# Patient Record
Sex: MALE | Race: WHITE | Employment: OTHER | URBAN - METROPOLITAN AREA
[De-identification: names, ages, dates, MRNs, and addresses within clinical notes are randomized per-mention and may not be internally consistent; named-entity substitution may affect disease eponyms.]

---

## 2017-02-02 RX ORDER — GABAPENTIN 600 MG/1
TABLET ORAL
Qty: 270 TAB | Refills: 3 | Status: SHIPPED | OUTPATIENT
Start: 2017-02-02 | End: 2018-03-12 | Stop reason: SDUPTHER

## 2017-02-02 NOTE — TELEPHONE ENCOUNTER
From: Ashli Pereira  To: Negrita Magallanes MD  Sent: 2/2/2017 7:41 AM EST  Subject: Medication Renewal Request    Original authorizing provider: MD Ashli Ba would like a refill of the following medications:  gabapentin (NEURONTIN) 600 mg tablet Negrita Magallanes MD]    Preferred pharmacy: [place] [id number], [address]:

## 2017-02-16 ENCOUNTER — LAB ONLY (OUTPATIENT)
Dept: INTERNAL MEDICINE CLINIC | Age: 67
End: 2017-02-16

## 2017-02-16 ENCOUNTER — HOSPITAL ENCOUNTER (OUTPATIENT)
Dept: LAB | Age: 67
Discharge: HOME | End: 2017-02-16
Payer: MEDICARE

## 2017-02-16 DIAGNOSIS — Z11.59 NEED FOR HEPATITIS C SCREENING TEST: Primary | ICD-10-CM

## 2017-02-16 DIAGNOSIS — Z11.59 NEED FOR HEPATITIS C SCREENING TEST: ICD-10-CM

## 2017-02-16 LAB
ALBUMIN SERPL BCP-MCNC: 4.6 G/DL (ref 3.4–5)
ALBUMIN/GLOB SERPL: 1.6 {RATIO} (ref 0.8–1.7)
ALP SERPL-CCNC: 57 U/L (ref 45–117)
ALT SERPL-CCNC: 32 U/L (ref 16–61)
ANION GAP BLD CALC-SCNC: 8 MMOL/L (ref 3–18)
AST SERPL W P-5'-P-CCNC: 28 U/L (ref 15–37)
BILIRUB SERPL-MCNC: 0.9 MG/DL (ref 0.2–1)
BUN SERPL-MCNC: 16 MG/DL (ref 7–18)
BUN/CREAT SERPL: 19 (ref 12–20)
CALCIUM SERPL-MCNC: 9.3 MG/DL (ref 8.5–10.1)
CHLORIDE SERPL-SCNC: 104 MMOL/L (ref 100–108)
CHOLEST SERPL-MCNC: 127 MG/DL
CO2 SERPL-SCNC: 30 MMOL/L (ref 21–32)
CREAT SERPL-MCNC: 0.85 MG/DL (ref 0.6–1.3)
ERYTHROCYTE [DISTWIDTH] IN BLOOD BY AUTOMATED COUNT: 13.3 % (ref 11.6–14.5)
GLOBULIN SER CALC-MCNC: 2.9 G/DL (ref 2–4)
GLUCOSE SERPL-MCNC: 85 MG/DL (ref 74–99)
HCT VFR BLD AUTO: 45.2 % (ref 36–48)
HDLC SERPL-MCNC: 54 MG/DL (ref 40–60)
HDLC SERPL: 2.4 {RATIO} (ref 0–5)
HGB BLD-MCNC: 14.9 G/DL (ref 13–16)
LDLC SERPL CALC-MCNC: 60.4 MG/DL (ref 0–100)
LIPID PROFILE,FLP: NORMAL
MCH RBC QN AUTO: 31.4 PG (ref 24–34)
MCHC RBC AUTO-ENTMCNC: 33 G/DL (ref 31–37)
MCV RBC AUTO: 95.4 FL (ref 74–97)
PLATELET # BLD AUTO: 247 K/UL (ref 135–420)
PMV BLD AUTO: 10.3 FL (ref 9.2–11.8)
POTASSIUM SERPL-SCNC: 4.2 MMOL/L (ref 3.5–5.5)
PROT SERPL-MCNC: 7.5 G/DL (ref 6.4–8.2)
RBC # BLD AUTO: 4.74 M/UL (ref 4.7–5.5)
SODIUM SERPL-SCNC: 142 MMOL/L (ref 136–145)
TRIGL SERPL-MCNC: 63 MG/DL (ref ?–150)
VLDLC SERPL CALC-MCNC: 12.6 MG/DL
WBC # BLD AUTO: 4.3 K/UL (ref 4.6–13.2)

## 2017-02-16 PROCEDURE — 80053 COMPREHEN METABOLIC PANEL: CPT | Performed by: INTERNAL MEDICINE

## 2017-02-16 PROCEDURE — 85027 COMPLETE CBC AUTOMATED: CPT | Performed by: INTERNAL MEDICINE

## 2017-02-16 PROCEDURE — 86803 HEPATITIS C AB TEST: CPT | Performed by: INTERNAL MEDICINE

## 2017-02-16 PROCEDURE — 36415 COLL VENOUS BLD VENIPUNCTURE: CPT | Performed by: INTERNAL MEDICINE

## 2017-02-16 PROCEDURE — 80061 LIPID PANEL: CPT | Performed by: INTERNAL MEDICINE

## 2017-02-17 LAB
HCV AB SER IA-ACNC: 0.6 INDEX
HCV AB SERPL QL IA: NEGATIVE
HCV COMMENT,HCGAC: NORMAL

## 2017-02-19 ENCOUNTER — APPOINTMENT (OUTPATIENT)
Dept: GENERAL RADIOLOGY | Age: 67
End: 2017-02-19
Attending: EMERGENCY MEDICINE
Payer: MEDICARE

## 2017-02-19 ENCOUNTER — HOSPITAL ENCOUNTER (EMERGENCY)
Age: 67
Discharge: HOME | End: 2017-02-19
Attending: EMERGENCY MEDICINE | Admitting: EMERGENCY MEDICINE
Payer: MEDICARE

## 2017-02-19 VITALS
HEIGHT: 68 IN | HEART RATE: 73 BPM | BODY MASS INDEX: 22.73 KG/M2 | WEIGHT: 150 LBS | DIASTOLIC BLOOD PRESSURE: 63 MMHG | SYSTOLIC BLOOD PRESSURE: 129 MMHG | OXYGEN SATURATION: 100 % | RESPIRATION RATE: 16 BRPM | TEMPERATURE: 98.4 F

## 2017-02-19 DIAGNOSIS — S40.012A CONTUSION OF LEFT SHOULDER, INITIAL ENCOUNTER: ICD-10-CM

## 2017-02-19 DIAGNOSIS — S20.212A CONTUSION OF LEFT FRONT WALL OF THORAX, INITIAL ENCOUNTER: Primary | ICD-10-CM

## 2017-02-19 DIAGNOSIS — S60.011A CONTUSION OF RIGHT THUMB WITHOUT DAMAGE TO NAIL, INITIAL ENCOUNTER: ICD-10-CM

## 2017-02-19 PROCEDURE — 74011250636 HC RX REV CODE- 250/636: Performed by: EMERGENCY MEDICINE

## 2017-02-19 PROCEDURE — 71101 X-RAY EXAM UNILAT RIBS/CHEST: CPT

## 2017-02-19 PROCEDURE — 77030008323 HC SPLNT FNGR GTR DJOR -A

## 2017-02-19 PROCEDURE — 73140 X-RAY EXAM OF FINGER(S): CPT

## 2017-02-19 PROCEDURE — 99282 EMERGENCY DEPT VISIT SF MDM: CPT

## 2017-02-19 PROCEDURE — 73030 X-RAY EXAM OF SHOULDER: CPT

## 2017-02-19 PROCEDURE — 90471 IMMUNIZATION ADMIN: CPT

## 2017-02-19 PROCEDURE — 74011250637 HC RX REV CODE- 250/637: Performed by: EMERGENCY MEDICINE

## 2017-02-19 PROCEDURE — 90715 TDAP VACCINE 7 YRS/> IM: CPT | Performed by: EMERGENCY MEDICINE

## 2017-02-19 RX ORDER — HYDROCODONE BITARTRATE AND ACETAMINOPHEN 5; 325 MG/1; MG/1
1 TABLET ORAL
Status: COMPLETED | OUTPATIENT
Start: 2017-02-19 | End: 2017-02-19

## 2017-02-19 RX ORDER — HYDROCODONE BITARTRATE AND ACETAMINOPHEN 5; 325 MG/1; MG/1
TABLET ORAL
Qty: 10 TAB | Refills: 0 | Status: SHIPPED | OUTPATIENT
Start: 2017-02-19 | End: 2017-02-23 | Stop reason: ALTCHOICE

## 2017-02-19 RX ADMIN — TETANUS TOXOID, REDUCED DIPHTHERIA TOXOID AND ACELLULAR PERTUSSIS VACCINE, ADSORBED 0.5 ML: 5; 2.5; 8; 8; 2.5 SUSPENSION INTRAMUSCULAR at 10:50

## 2017-02-19 RX ADMIN — HYDROCODONE BITARTRATE AND ACETAMINOPHEN 1 TABLET: 5; 325 TABLET ORAL at 10:27

## 2017-02-19 NOTE — ED TRIAGE NOTES
Pt presents to the ED with left shoulder pain and left arm pain, and left back pain onset yesterday approximately 1600 hrs. Pt reports pain radiating to the left sternum and left posterior shoulder. Pt reports extreme pain with ROM. Pt denies head injury or LOC.
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
## 2017-02-19 NOTE — ED NOTES
Pt states ready for discharge. Pt states he will follow up with PCP as instructed by provider. Pt appears in NOAD. I have reviewed discharge instructions with the patient. Prescriptions were reviewed with patient instructed not to drink alcohol, drive a car, or operate heavy machinery while taking this medicine. The patient verbalized understanding. Patient seen leaving ED ambulatory without difficulty or need for assistance, with S/O in no sign of distress. Patient armband removed and shredded    Current Discharge Medication List      START taking these medications    Details   HYDROcodone-acetaminophen (NORCO) 5-325 mg per tablet Take 1-2 tablets PO every 4-6 hours as needed for pain control. If over the counter ibuprofen or acetaminophen was suggested, then only take the vicodin for pain not well controlled with the over the counter medication. Qty: 10 Tab, Refills: 0         CONTINUE these medications which have NOT CHANGED    Details   gabapentin (NEURONTIN) 600 mg tablet take 1 tablet by mouth three times a day  Qty: 270 Tab, Refills: 3      atorvastatin (LIPITOR) 10 mg tablet Take 1 Tab by mouth daily. Qty: 90 Tab, Refills: 3    Associated Diagnoses: Dyslipidemia      zolpidem (AMBIEN) 10 mg tablet Take 1 tablet by mouth nightly as needed. Qty: 30 tablet, Refills: 3    Associated Diagnoses: Annual physical exam      traMADol (ULTRAM) 50 mg tablet Take 1 Tab by mouth every six (6) hours as needed for Pain.   Qty: 60 Tab, Refills: 1         STOP taking these medications       diph,Pertuss,Acell,,Tet Vac-PF (ADACEL) 2 Lf-(2.5-5-3-5 mcg)-5Lf/0.5 mL susp Comments:   Reason for Stopping:

## 2017-02-19 NOTE — PATIENT INSTRUCTIONS
Medicare Part B Preventive Services Limitations Recommendation Scheduled   Bone Mass Measurement  (age 72 & older, biennial) Requires diagnosis related to osteoporosis or estrogen deficiency. Biennial benefit unless patient has history of long-term glucocorticoid tx or baseline is needed because initial test was by other method     Cardiovascular Screening Blood Tests (every 5 years)  Total cholesterol, HDL, Triglycerides Order as a panel if possible     Colorectal Cancer Screening  -Fecal occult blood test (annual)  -Flexible sigmoidoscopy (5y)  -Screening colonoscopy (10y)  -Barium Enema      Counseling to Prevent Tobacco Use (up to 8 sessions per year)  - Counseling greater than 3 and up to 10 minutes  - Counseling greater than 10 minutes Patients must be asymptomatic of tobacco-related conditions to receive as preventive service     Diabetes Screening Tests (at least every 3 years, Medicare covers annually or at 6-month intervals for prediabetic patients)    Fasting blood sugar (FBS) or glucose tolerance test (GTT) Patient must be diagnosed with one of the following:  -Hypertension, Dyslipidemia, obesity, previous impaired FBS or GTT  Or any two of the following: overweight, FH of diabetes, age ? 72, history of gestational diabetes, birth of baby weighing more than 9 pounds     Diabetes Self-Management Training (DSMT) (no USPSTF recommendation) Requires referral by treating physician for patient with diabetes or renal disease. 10 hours of initial DSMT session of no less than 30 minutes each in a continuous 12-month period. 2 hours of follow-up DSMT in subsequent years.      Glaucoma Screening (no USPSTF recommendation) Diabetes mellitus, family history, , age 48 or over,  American, age 72 or over     Human Immunodeficiency Virus (HIV) Screening (annually for increased risk patients)  HIV-1 and HIV-2 by EIA, CATHY, rapid antibody test, or oral mucosa transudate Patient must be at increased risk for HIV infection per USPSTF guidelines or pregnant. Tests covered annually for patients at increased risk. Pregnant patients may receive up to 3 test during pregnancy. Medical Nutrition Therapy (MNT) (for diabetes or renal disease not recommended schedule) Requires referral by treating physician for patient with diabetes or renal disease. Can be provided in same year as diabetes self-management training (DSMT), and CMS recommends medical nutrition therapy take place after DSMT. Up to 3 hours for initial year and 2 hours in subsequent years. Prostate Cancer Screening (annually up to age 76)  - Digital rectal exam (ODALYS)  - Prostate specific antigen (PSA) Annually (age 48 or over), ODALYS not paid separately when covered E/M service is provided on same date     Seasonal Influenza Vaccination (annually)      Pneumococcal Vaccination (once after 72)      Hepatitis B Vaccinations (if medium/high risk) Medium/high risk factors:  End-stage renal disease,  Hemophiliacs who received Factor VIII or IX concentrates, Clients of institutions for the mentally retarded, Persons who live in the same house as a HepB virus carrier, Homosexual men, Illicit injectable drug abusers. Screening Mammography (biennial age 54-69)? Annually (age 36 or over)     Screening Pap Tests and Pelvic Examination (up to age 79 and after 79 if unknown history or abnormal study last 10 years) Every 25 months except high risk     Ultrasound Screening for Abdominal Aortic Aneurysm (AAA) (once) Patient must be referred through IPPE and not have had a screening for abdominal aortic aneurysm before under Medicare.   Limited to patients who meet one of the following criteria:  - Men who are 73-68 years old and have smoked more than 100 cigarettes in their lifetime.  -Anyone with a FH of AAA  -Anyone recommended for screening by USPSTF

## 2017-02-19 NOTE — ACP (ADVANCE CARE PLANNING)
Advance Care Planning    Advance Care Planning (ACP) Provider Note - Comprehensive     Date of ACP Conversation: 02/23/17  Persons included in Conversation:  patient  Length of ACP Conversation in minutes:  16 minutes    Authorized Decision Maker (if patient is incapable of making informed decisions): This person is: daughter  Healthcare Agent/Medical Power of  under Advance Directive          General ACP for ALL Patients with Decision Making Capacity:   Importance of advance care planning, including choosing a healthcare agent to communicate patient's healthcare decisions if patient lost the ability to make decisions, such as after a sudden illness or accident  Understanding of the healthcare agent role was assessed and information provided  Exploration of values, goals, and preferences if recovery is not expected, even with continued medical treatment in the event of: Imminent death  Severe, permanent brain injury    Review of Existing Advance Directive:  na    For Serious or Chronic Illness:  Understanding of medical condition    Understanding of CPR, goals and expected outcomes, benefits and burdens discussed.     Interventions Provided:  Recommended completion of Advance Directive form after review of ACP materials and conversation with prospective healthcare agent   Recommended communicating the plan and making copies for the healthcare agent, personal physician, and others as appropriate (e.g., health system)  Recommended review of completed ACP document annually or upon change in health status

## 2017-02-19 NOTE — DISCHARGE INSTRUCTIONS
Scrapes (Abrasions): Care Instructions  Your Care Instructions  Scrapes (abrasions) are wounds where your skin has been rubbed or torn off. Most scrapes do not go deep into the skin, but some may remove several layers of skin. Scrapes usually don't bleed much, but they may ooze pinkish fluid. Scrapes on the head or face may appear worse than they are. They may bleed a lot because of the good blood supply to this area. Most scrapes heal well and may not need a bandage. They usually heal within 3 to 7 days. A large, deep scrape may take 1 to 2 weeks or longer to heal. A scab may form on some scrapes. Follow-up care is a key part of your treatment and safety. Be sure to make and go to all appointments, and call your doctor if you are having problems. It's also a good idea to know your test results and keep a list of the medicines you take. How can you care for yourself at home? · If your doctor told you how to care for your wound, follow your doctor's instructions. If you did not get instructions, follow this general advice:  ¨ Wash the scrape with clean water 2 times a day. Don't use hydrogen peroxide or alcohol, which can slow healing. ¨ You may cover the scrape with a thin layer of petroleum jelly, such as Vaseline, and a nonstick bandage. ¨ Apply more petroleum jelly and replace the bandage as needed. · Prop up the injured area on a pillow anytime you sit or lie down during the next 3 days. Try to keep it above the level of your heart. This will help reduce swelling. · Be safe with medicines. Take pain medicines exactly as directed. ¨ If the doctor gave you a prescription medicine for pain, take it as prescribed. ¨ If you are not taking a prescription pain medicine, ask your doctor if you can take an over-the-counter medicine. When should you call for help?   Call your doctor now or seek immediate medical care if:  · You have signs of infection, such as:  ¨ Increased pain, swelling, warmth, or redness around the scrape. ¨ Red streaks leading from the scrape. ¨ Pus draining from the scrape. ¨ A fever. · The scrape starts to bleed, and blood soaks through the bandage. Oozing small amounts of blood is normal.  Watch closely for changes in your health, and be sure to contact your doctor if the scrape is not getting better each day. Where can you learn more? Go to http://cheyanne-ted.info/. Enter A374 in the search box to learn more about \"Scrapes (Abrasions): Care Instructions. \"  Current as of: May 27, 2016  Content Version: 11.1  © 0957-6251 [employer]. Care instructions adapted under license by [employer] (which disclaims liability or warranty for this information). If you have questions about a medical condition or this instruction, always ask your healthcare professional. [place] [address] any warranty or liability for your use of this information. Bruises: Care Instructions  Your Care Instructions    Bruises occur when small blood vessels under the skin tear or rupture, most often from a twist, bump, or fall. Blood leaks into tissues under the skin and causes a black-and-blue spot that often turns colors, including purplish black, reddish blue, or yellowish green, as the bruise heals. Bruises hurt, but most are not serious and will go away on their own within 2 to 4 weeks. Sometimes, gravity causes them to spread down the body. A leg bruise usually will take longer to heal than a bruise on the face or arms. Follow-up care is a key part of your treatment and safety. Be sure to make and go to all appointments, and call your doctor if you are having problems. Its also a good idea to know your test results and keep a list of the medicines you take. How can you care for yourself at home? · Take pain medicines exactly as directed. ¨ If the doctor gave you a prescription medicine for pain, take it as prescribed.   ¨ If you are not taking a prescription pain medicine, ask your doctor if you can take an over-the-counter medicine. · Put ice or a cold pack on the area for 10 to 20 minutes at a time. Put a thin cloth between the ice and your skin. · If you can, prop up the bruised area on pillows as much as possible for the next few days. Try to keep the bruise above the level of your heart. When should you call for help? Call your doctor now or seek immediate medical care if:  · You have signs of infection, such as:  ¨ Increased pain, swelling, warmth, or redness. ¨ Red streaks leading from the bruise. ¨ Pus draining from the bruise. ¨ A fever. · You have a bruise on your leg and signs of a blood clot, such as:  ¨ Increasing redness and swelling along with warmth, tenderness, and pain in the bruised area. ¨ Pain in your calf, back of the knee, thigh, or groin. ¨ Redness and swelling in your leg or groin. · Your pain gets worse. Watch closely for changes in your health, and be sure to contact your doctor if:  · You do not get better as expected. Where can you learn more? Go to http://cheyanne-ted.info/. Enter [phone] in the search box to learn more about \"Bruises: Care Instructions. \"  Current as of: May 27, 2016  Content Version: 11.1  © 7517-5636 [employer]. Care instructions adapted under license by [email or web address] (which disclaims liability or warranty for this information). If you have questions about a medical condition or this instruction, always ask your healthcare professional. [place] [address] any warranty or liability for your use of this information.
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
## 2017-02-20 ENCOUNTER — PATIENT OUTREACH (OUTPATIENT)
Dept: INTERNAL MEDICINE CLINIC | Age: 67
End: 2017-02-20
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
## 2017-02-22 ENCOUNTER — PATIENT OUTREACH (OUTPATIENT)
Dept: INTERNAL MEDICINE CLINIC | Age: 67
End: 2017-02-22

## 2017-02-22 ENCOUNTER — OFFICE VISIT (OUTPATIENT)
Dept: ORTHOPEDIC SURGERY | Facility: CLINIC | Age: 67
End: 2017-02-22

## 2017-02-22 VITALS
HEIGHT: 68 IN | HEART RATE: 71 BPM | SYSTOLIC BLOOD PRESSURE: 129 MMHG | BODY MASS INDEX: 23.64 KG/M2 | DIASTOLIC BLOOD PRESSURE: 66 MMHG | WEIGHT: 156 LBS | TEMPERATURE: 98 F

## 2017-02-22 DIAGNOSIS — M79.644 THUMB PAIN, RIGHT: Primary | ICD-10-CM
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
## 2017-02-22 NOTE — MR AVS SNAPSHOT
Visit Information Date & Time Provider Department Dept. Phone Encounter #  
 2/22/2017  8:00 AM Rigoberto Mason, 27 [facility] - [facility] [phone] [id number] Your Appointments 2/23/2017  8:30 AM  
PHYSICAL with Gopi Wallace MD  
Internist of [facility]) Appt Note: rpe rd; rpe rd  
 [address], [address] [address] [address]  
  
   
 [address], [address] Upcoming Health Maintenance Date Due  
 MEDICARE YEARLY EXAM 2/24/2018 COLONOSCOPY 8/29/2018 GLAUCOMA SCREENING Q2Y 2/18/2019 DTaP/Tdap/Td series (2 - Td) 2/19/2027 Allergies as of 2/22/2017  Review Complete On: 2/22/2017 By: Rigoberto Mason MD  
  
 Severity Noted Reaction Type Reactions Flomax [Tamsulosin]  07/25/2011    Vertigo Uroxatral [Alfuzosin]  02/29/2012    Vertigo Current Immunizations  Reviewed on 2/18/2017 Name Date Influenza High Dose Vaccine PF 11/14/2016, 10/15/2015 Influenza Vaccine 11/10/2014 Pneumococcal Conjugate (PCV-13) 2/11/2016 Pneumococcal Polysaccharide (PPSV-23) 2/5/2015 Td 1/1/2010 Tdap 2/19/2017 10:50 AM  
 Zoster Vaccine, Live 1/1/2010 Not reviewed this visit You Were Diagnosed With   
  
 Codes Comments Thumb pain, right    -  Primary ICD-10-CM: B88.545 ICD-9-CM: 729.5 Vitals BP  
  
  
  
  
  
 129/66 Vitals History BMI and BSA Data Body Mass Index Body Surface Area  
 23.72 kg/m 2 1.84 m 2 Preferred Pharmacy Pharmacy Name Phone [address], [address] [place] [address] [phone] Your Updated Medication List  
  
   
This list is accurate as of: 2/22/17  8:34 AM.  Always use your most recent med list.  
  
  
  
  
 atorvastatin 10 mg tablet Commonly known as:  LIPITOR Take 1 Tab by mouth daily. gabapentin 600 mg tablet Commonly known as:  NEURONTIN  
take 1 tablet by mouth three times a day HYDROcodone-acetaminophen 5-325 mg per tablet Commonly known as:  Margaret Linnette Take 1-2 tablets PO every 4-6 hours as needed for pain control. If over the counter ibuprofen or acetaminophen was suggested, then only take the vicodin for pain not well controlled with the over the counter medication. traMADol 50 mg tablet Commonly known as:  ULTRAM  
Take 1 Tab by mouth every six (6) hours as needed for Pain.  
  
 zolpidem 10 mg tablet Commonly known as:  AMBIEN Take 1 tablet by mouth nightly as needed. We Performed the Following AMB POC XRAY, FINGER(S), 2+ VIEWS [54367 CPT(R)] Patient Instructions You should follow up in 2 weeks. If your condition worsens, contact our office. Finger Fracture: Care Instructions Your Care Instructions Breaks in the bones of the finger usually heal well in about 3 to 4 weeks. The pain and swelling from a broken finger can last for weeks. But it should steadily improve, starting a few days after you break it. It is very important that you wear and take care of the cast or splint exactly as your doctor tells you to so that your finger heals properly and does not end up crooked. Wearing a splint may interfere with your normal activities. Ask for help with daily tasks if you need it. You heal best when you take good care of yourself. Eat a variety of healthy foods, and don't smoke. Follow-up care is a key part of your treatment and safety. Be sure to make and go to all appointments, and call your doctor if you are having problems. It's also a good idea to know your test results and keep a list of the medicines you take. How can you care for yourself at home? · If your doctor put a splint on your finger, wear the splint exactly as directed. Do not remove it until your doctor says that you can. · Keep your hand raised above the level of your heart as much as you can. This will help reduce swelling. · Put ice or a cold pack on your finger for 10 to 20 minutes at a time. Try to do this every 1 to 2 hours for the next 3 days (when you are awake) or until the swelling goes down. Put a thin cloth between the ice and your skin. Keep the splint dry. · Be safe with medicines. Take pain medicines exactly as directed. ¨ If the doctor gave you a prescription medicine for pain, take it as prescribed. ¨ If you are not taking a prescription pain medicine, ask your doctor if you can take an over-the-counter medicine. When should you call for help? Call 911 anytime you think you may need emergency care. For example, call if: 
· Your finger is cool or pale or changes color. Call your doctor now or seek immediate medical care if: 
· Your pain gets much worse. · You have tingling, weakness, or numbness in your finger. · You have signs of infection, such as: 
¨ Increased pain, swelling, warmth, or redness. ¨ Red streaks leading from the area. ¨ Pus draining from the area. ¨ Swollen lymph nodes in your neck, armpits, or groin. ¨ A fever. Watch closely for changes in your health, and be sure to contact your doctor if: 
· Your finger is not steadily improving. Where can you learn more? Go to http://cheyanne-ted.info/. Enter U931 in the search box to learn more about \"Finger Fracture: Care Instructions. \" Current as of: May 23, 2016 Content Version: 11.1 © 5430-3880 Healthwise, Incorporated. Care instructions adapted under license by [employer] (which disclaims liability or warranty for this information). If you have questions about a medical condition or this instruction, always ask your healthcare professional. [place] [address] any warranty or liability for your use of this information. Introducing [facility] [place] [facility]! Dear Ramakrishna Morales: Thank you for requesting a "Hey, Neighbor!" account. Our records indicate that you already have an active "Hey, Neighbor!" account. You can access your account anytime at [email or web address] Did you know that you can access your hospital and ER discharge instructions at any time in "Hey, Neighbor!"? You can also review all of your test results from your hospital stay or ER visit. Additional Information If you have questions, please visit the Frequently Asked Questions section of the "Hey, Neighbor!" website at [email or web address] Remember, "Hey, Neighbor!" is NOT to be used for urgent needs. For medical emergencies, dial 911. Now available from your iPhone and Android! Please provide this summary of care documentation to your next provider. Your primary care clinician is listed as Amy Quispe. If you have any questions after today's visit, please call [phone].

## 2017-02-22 NOTE — PATIENT INSTRUCTIONS
You should follow up in 2 weeks. If your condition worsens, contact our office. Finger Fracture: Care Instructions  Your Care Instructions    Breaks in the bones of the finger usually heal well in about 3 to 4 weeks. The pain and swelling from a broken finger can last for weeks. But it should steadily improve, starting a few days after you break it. It is very important that you wear and take care of the cast or splint exactly as your doctor tells you to so that your finger heals properly and does not end up crooked. Wearing a splint may interfere with your normal activities. Ask for help with daily tasks if you need it. You heal best when you take good care of yourself. Eat a variety of healthy foods, and don't smoke. Follow-up care is a key part of your treatment and safety. Be sure to make and go to all appointments, and call your doctor if you are having problems. It's also a good idea to know your test results and keep a list of the medicines you take. How can you care for yourself at home? · If your doctor put a splint on your finger, wear the splint exactly as directed. Do not remove it until your doctor says that you can. · Keep your hand raised above the level of your heart as much as you can. This will help reduce swelling. · Put ice or a cold pack on your finger for 10 to 20 minutes at a time. Try to do this every 1 to 2 hours for the next 3 days (when you are awake) or until the swelling goes down. Put a thin cloth between the ice and your skin. Keep the splint dry. · Be safe with medicines. Take pain medicines exactly as directed. ¨ If the doctor gave you a prescription medicine for pain, take it as prescribed. ¨ If you are not taking a prescription pain medicine, ask your doctor if you can take an over-the-counter medicine. When should you call for help? Call 911 anytime you think you may need emergency care. For example, call if:  · Your finger is cool or pale or changes color.   Call your doctor now or seek immediate medical care if:  · Your pain gets much worse. · You have tingling, weakness, or numbness in your finger. · You have signs of infection, such as:  ¨ Increased pain, swelling, warmth, or redness. ¨ Red streaks leading from the area. ¨ Pus draining from the area. ¨ Swollen lymph nodes in your neck, armpits, or groin. ¨ A fever. Watch closely for changes in your health, and be sure to contact your doctor if:  · Your finger is not steadily improving. Where can you learn more? Go to http://cheyanne-ted.info/. Enter Z656 in the search box to learn more about \"Finger Fracture: Care Instructions. \"  Current as of: May 23, 2016  Content Version: 11.1  © 3820-9282 [employer]. Care instructions adapted under license by [employer] (which disclaims liability or warranty for this information). If you have questions about a medical condition or this instruction, always ask your healthcare professional. [place] [address] any warranty or liability for your use of this information.

## 2017-02-23 ENCOUNTER — OFFICE VISIT (OUTPATIENT)
Dept: INTERNAL MEDICINE CLINIC | Age: 67
End: 2017-02-23

## 2017-02-23 VITALS
HEART RATE: 70 BPM | BODY MASS INDEX: 23.19 KG/M2 | HEIGHT: 68 IN | DIASTOLIC BLOOD PRESSURE: 60 MMHG | SYSTOLIC BLOOD PRESSURE: 116 MMHG | WEIGHT: 153 LBS | OXYGEN SATURATION: 97 % | TEMPERATURE: 98.1 F

## 2017-02-23 DIAGNOSIS — Z12.11 SCREEN FOR COLON CANCER: ICD-10-CM

## 2017-02-23 DIAGNOSIS — Z00.00 ROUTINE GENERAL MEDICAL EXAMINATION AT A HEALTH CARE FACILITY: Primary | ICD-10-CM

## 2017-02-23 DIAGNOSIS — Z13.1 SCREENING FOR DIABETES MELLITUS: ICD-10-CM

## 2017-02-23 DIAGNOSIS — Z13.6 SCREENING FOR ISCHEMIC HEART DISEASE: ICD-10-CM

## 2017-02-23 DIAGNOSIS — M25.512 ACUTE PAIN OF LEFT SHOULDER: ICD-10-CM

## 2017-02-23 DIAGNOSIS — Z12.5 SPECIAL SCREENING FOR MALIGNANT NEOPLASM OF PROSTATE: ICD-10-CM

## 2017-02-23 DIAGNOSIS — M15.9 PRIMARY OSTEOARTHRITIS INVOLVING MULTIPLE JOINTS: ICD-10-CM

## 2017-02-23 DIAGNOSIS — Z13.39 SCREENING FOR ALCOHOLISM: ICD-10-CM

## 2017-02-23 DIAGNOSIS — Z13.31 SCREENING FOR DEPRESSION: ICD-10-CM

## 2017-02-23 DIAGNOSIS — E78.5 DYSLIPIDEMIA: ICD-10-CM

## 2017-02-23 DIAGNOSIS — N13.8 BPH WITH OBSTRUCTION/LOWER URINARY TRACT SYMPTOMS: ICD-10-CM

## 2017-02-23 DIAGNOSIS — K63.5 COLON POLYP: ICD-10-CM

## 2017-02-23 DIAGNOSIS — S62.501D: ICD-10-CM

## 2017-02-23 DIAGNOSIS — Z71.89 ADVANCED DIRECTIVES, COUNSELING/DISCUSSION: ICD-10-CM

## 2017-02-23 DIAGNOSIS — N40.1 BPH WITH OBSTRUCTION/LOWER URINARY TRACT SYMPTOMS: ICD-10-CM

## 2017-02-23 RX ORDER — OXYCODONE AND ACETAMINOPHEN 7.5; 325 MG/1; MG/1
1 TABLET ORAL
Qty: 30 TAB | Refills: 0 | Status: SHIPPED | OUTPATIENT
Start: 2017-02-23 | End: 2017-03-08 | Stop reason: SDUPTHER

## 2017-02-23 NOTE — PROGRESS NOTES
1. Have you been to the ER, urgent care clinic or hospitalized since your last visit? NO.     2. Have you seen or consulted any other health care providers outside of the [address] [place] [address] since your last visit (Include any pap smears or colon screening)? NO      Do you have an Advanced Directive? NO    Would you like information on Advanced Directives?  YES
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
## 2017-03-08 ENCOUNTER — OFFICE VISIT (OUTPATIENT)
Dept: ORTHOPEDIC SURGERY | Facility: CLINIC | Age: 67
End: 2017-03-08

## 2017-03-08 VITALS
TEMPERATURE: 98.1 F | WEIGHT: 153 LBS | HEART RATE: 72 BPM | DIASTOLIC BLOOD PRESSURE: 66 MMHG | HEIGHT: 68 IN | SYSTOLIC BLOOD PRESSURE: 120 MMHG | BODY MASS INDEX: 23.19 KG/M2

## 2017-03-08 DIAGNOSIS — R07.89 ATYPICAL CHEST PAIN: ICD-10-CM

## 2017-03-08 DIAGNOSIS — S62.501D: ICD-10-CM

## 2017-03-08 DIAGNOSIS — G89.11 ACUTE SHOULDER PAIN DUE TO TRAUMA, LEFT: ICD-10-CM

## 2017-03-08 DIAGNOSIS — M25.512 ACUTE PAIN OF LEFT SHOULDER: ICD-10-CM

## 2017-03-08 DIAGNOSIS — M25.512 ACUTE SHOULDER PAIN DUE TO TRAUMA, LEFT: ICD-10-CM

## 2017-03-08 DIAGNOSIS — M79.644 THUMB PAIN, RIGHT: Primary | ICD-10-CM

## 2017-03-08 RX ORDER — OXYCODONE AND ACETAMINOPHEN 7.5; 325 MG/1; MG/1
1 TABLET ORAL
Qty: 30 TAB | Refills: 0 | Status: SHIPPED | OUTPATIENT
Start: 2017-03-08 | End: 2019-03-11

## 2017-03-08 NOTE — PATIENT INSTRUCTIONS
Shoulder Sprain: Care Instructions  Your Care Instructions    A shoulder sprain occurs when you stretch or tear a ligament in your shoulder. Ligaments are tough tissues that connect one bone to another. A sprain can happen during sports, a fall, or projects around the house. Shoulder sprains usually get better with treatment at home. Follow-up care is a key part of your treatment and safety. Be sure to make and go to all appointments, and call your doctor if you are having problems. It's also a good idea to know your test results and keep a list of the medicines you take. How can you care for yourself at home? · Rest and protect your shoulder. Try to stop or reduce any action that causes pain. · If your doctor gave you a sling or immobilizer, wear it as directed. A sling or immobilizer supports your shoulder and may make you more comfortable. · Put ice or a cold pack on your shoulder for 10 to 20 minutes at a time. Try to do this every 1 to 2 hours for the next 3 days (when you are awake) or until the swelling goes down. Put a thin cloth between the ice and your skin. Some doctors suggest alternating between hot and cold. · Be safe with medicines. Read and follow all instructions on the label. ¨ If the doctor gave you a prescription medicine for pain, take it as prescribed. ¨ If you are not taking a prescription pain medicine, ask your doctor if you can take an over-the-counter medicine. · For the first day or two after an injury, avoid things that might increase swelling, such as hot showers, hot tubs, or hot packs. · After 2 or 3 days, if your swelling is gone, apply a heating pad set on low or a warm cloth to your shoulder. This helps keep your shoulder flexible. Some doctors suggest that you go back and forth between hot and cold. Put a thin cloth between the heating pad and your skin. · Follow your doctor's or physical therapist's directions for exercises.   · Return to your usual level of activity slowly. When should you call for help? Call your doctor now or seek immediate medical care if:  · Your pain is worse. · You cannot move your shoulder. · Your arm is cool or pale or changes color below the shoulder. · You have tingling, weakness, or numbness in your arm. Watch closely for changes in your health, and be sure to contact your doctor if:  · You do not get better as expected. Where can you learn more? Go to http://cheyanne-ted.info/. Enter C696 in the search box to learn more about \"Shoulder Sprain: Care Instructions. \"  Current as of: May 23, 2016  Content Version: 11.1  © 3253-4714 [employer]. Care instructions adapted under license by [employer] (which disclaims liability or warranty for this information). If you have questions about a medical condition or this instruction, always ask your healthcare professional. [address] any warranty or liability for your use of this information.
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
## 2017-03-08 NOTE — MR AVS SNAPSHOT
Visit Information Date & Time Provider Department Dept. Phone Encounter #  
 3/8/2017  7:30 AM Deepthi Wylie MD [facility] - [place] [phone] Your Appointments 2/22/2018  7:45 AM  
LAB with [facility] NURSE VISIT [facility] ([facility]) Appt Note: labs 1yr rd  
 [address] [address], [address] [address]  
  
   
 [address], [address]  
  
    
 3/1/2018  8:30 AM  
PHYSICAL with Braeden Carrillo MD  
[facility]) Appt Note: [address]   
 [address], [address] [address]  
  
   
 [address], [address] Upcoming Health Maintenance Date Due  
 MEDICARE YEARLY EXAM 2/24/2018 COLONOSCOPY 8/29/2018 GLAUCOMA SCREENING Q2Y 2/18/2019 DTaP/Tdap/Td series (2 - Td) 2/19/2027 Allergies as of 3/8/2017  Review Complete On: 3/8/2017 By: Deepthi Wylie MD  
  
 Severity Noted Reaction Type Reactions Flomax [Tamsulosin]  07/25/2011    Vertigo Uroxatral [Alfuzosin]  02/29/2012    Vertigo Current Immunizations  Reviewed on 2/18/2017 Name Date Influenza High Dose Vaccine PF 11/14/2016, 10/15/2015 Influenza Vaccine 11/10/2014 Pneumococcal Conjugate (PCV-13) 2/11/2016 Pneumococcal Polysaccharide (PPSV-23) 2/5/2015 Td 1/1/2010 Tdap 2/19/2017 10:50 AM  
 Zoster Vaccine, Live 1/1/2010 Not reviewed this visit You Were Diagnosed With   
  
 Codes Comments Thumb pain, right    -  Primary ICD-10-CM: Z90.045 ICD-9-CM: 729.5 Acute shoulder pain due to trauma, left     ICD-10-CM: M25.512, G89.11 ICD-9-CM: 719.41, 338.11 Atypical chest pain     ICD-10-CM: R07.89 ICD-9-CM: 786.59 Vitals BP Pulse Temp Height(growth percentile) Weight(growth percentile) BMI 120/66 72 98.1 °F (36.7 °C) 5' 8\" (1.727 m) 153 lb (69.4 kg) 23.26 kg/m2 Smoking Status Never Smoker Vitals History BMI and BSA Data Body Mass Index Body Surface Area  
 23.26 kg/m 2 1.82 m 2 Preferred Pharmacy Pharmacy Name Phone [address], [address] [place] [address] [phone] Your Updated Medication List  
  
   
This list is accurate as of: 3/8/17  8:44 AM.  Always use your most recent med list.  
  
  
  
  
 atorvastatin 10 mg tablet Commonly known as:  LIPITOR Take 1 Tab by mouth daily. gabapentin 600 mg tablet Commonly known as:  NEURONTIN  
take 1 tablet by mouth three times a day  
  
 oxyCODONE-acetaminophen 7.5-325 mg per tablet Commonly known as:  PERCOCET 7.5 Take 1 Tab by mouth every six (6) hours as needed for Pain. Max Daily Amount: 4 Tabs. We Performed the Following AMB POC XRAY, FINGER(S), 2+ VIEWS [86133 CPT(R)] AMB POC XRAY, SHOULDER; COMPLETE, 2+ [08283 CPT(R)] Patient Instructions Shoulder Sprain: Care Instructions Your Care Instructions A shoulder sprain occurs when you stretch or tear a ligament in your shoulder. Ligaments are tough tissues that connect one bone to another. A sprain can happen during sports, a fall, or projects around the house. Shoulder sprains usually get better with treatment at home. Follow-up care is a key part of your treatment and safety. Be sure to make and go to all appointments, and call your doctor if you are having problems. It's also a good idea to know your test results and keep a list of the medicines you take. How can you care for yourself at home? · Rest and protect your shoulder. Try to stop or reduce any action that causes pain. · If your doctor gave you a sling or immobilizer, wear it as directed. A sling or immobilizer supports your shoulder and may make you more comfortable. · Put ice or a cold pack on your shoulder for 10 to 20 minutes at a time. Try to do this every 1 to 2 hours for the next 3 days (when you are awake) or until the swelling goes down. Put a thin cloth between the ice and your skin. Some doctors suggest alternating between hot and cold. · Be safe with medicines. Read and follow all instructions on the label. ¨ If the doctor gave you a prescription medicine for pain, take it as prescribed. ¨ If you are not taking a prescription pain medicine, ask your doctor if you can take an over-the-counter medicine. · For the first day or two after an injury, avoid things that might increase swelling, such as hot showers, hot tubs, or hot packs. · After 2 or 3 days, if your swelling is gone, apply a heating pad set on low or a warm cloth to your shoulder. This helps keep your shoulder flexible. Some doctors suggest that you go back and forth between hot and cold. Put a thin cloth between the heating pad and your skin. · Follow your doctor's or physical therapist's directions for exercises. · Return to your usual level of activity slowly. When should you call for help? Call your doctor now or seek immediate medical care if: 
· Your pain is worse. · You cannot move your shoulder. · Your arm is cool or pale or changes color below the shoulder. · You have tingling, weakness, or numbness in your arm. Watch closely for changes in your health, and be sure to contact your doctor if: 
· You do not get better as expected. Where can you learn more? Go to http://cheyanne-ted.info/. Enter E114 in the search box to learn more about \"Shoulder Sprain: Care Instructions. \" Current as of: May 23, 2016 Content Version: 11.1 © 0996-6040 [employer]. Care instructions adapted under license by [employer] (which disclaims liability or warranty for this information).  If you have questions about a medical condition or this instruction, always ask your healthcare professional. [address] any warranty or liability for your use of this information. Introducing [facility]! Dear Danelle Caceres: Thank you for requesting a [employer] account. Our records indicate that you already have an active [employer] account. You can access your account anytime at [email or web address] Did you know that you can access your hospital and ER discharge instructions at any time in [employer]? You can also review all of your test results from your hospital stay or ER visit. Additional Information If you have questions, please visit the Frequently Asked Questions section of the [employer] website at [email or web address] Remember, [employer] is NOT to be used for urgent needs. For medical emergencies, dial 911. Now available from your iPhone and Android! Please provide this summary of care documentation to your next provider. Your primary care clinician is listed as Phyllis Gerber. If you have any questions after today's visit, please call [phone].

## 2017-03-09 ENCOUNTER — HOSPITAL ENCOUNTER (OUTPATIENT)
Dept: CT IMAGING | Age: 67
Discharge: HOME | End: 2017-03-09
Attending: ORTHOPAEDIC SURGERY
Payer: MEDICARE

## 2017-03-09 DIAGNOSIS — R07.89 ATYPICAL CHEST PAIN: ICD-10-CM

## 2017-03-09 PROCEDURE — 71250 CT THORAX DX C-: CPT

## 2017-03-10 ENCOUNTER — OFFICE VISIT (OUTPATIENT)
Dept: ORTHOPEDIC SURGERY | Age: 67
End: 2017-03-10

## 2017-03-10 VITALS
DIASTOLIC BLOOD PRESSURE: 62 MMHG | SYSTOLIC BLOOD PRESSURE: 108 MMHG | HEART RATE: 67 BPM | BODY MASS INDEX: 23.19 KG/M2 | HEIGHT: 68 IN | WEIGHT: 153 LBS | TEMPERATURE: 97.3 F

## 2017-03-10 DIAGNOSIS — S22.42XA CLOSED FRACTURE OF MULTIPLE RIBS OF LEFT SIDE, INITIAL ENCOUNTER: Primary | ICD-10-CM
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
## 2017-03-22 ENCOUNTER — PATIENT OUTREACH (OUTPATIENT)
Dept: INTERNAL MEDICINE CLINIC | Age: 67
End: 2017-03-22

## 2017-03-22 NOTE — PROGRESS NOTES
Goals Addressed             Most Recent     COMPLETED: Establish PCP relationships and regularly scheduled appointments. On track (3/22/2017)             Patient attended LEMUS Ashdown appointment on 2/23/2017.  COMPLETED: Reduce ED Utilization   On track (3/22/2017)             Episode closed: no hospitalization or ED admission post 30 days from discharge of 2/19/2017.

## 2017-03-28 ENCOUNTER — HOSPITAL ENCOUNTER (OUTPATIENT)
Dept: PHYSICAL THERAPY | Age: 67
Discharge: HOME | End: 2017-03-28
Payer: MEDICARE

## 2017-03-28 PROCEDURE — 97110 THERAPEUTIC EXERCISES: CPT

## 2017-03-28 PROCEDURE — G8985 CARRY GOAL STATUS: HCPCS

## 2017-03-28 PROCEDURE — G8984 CARRY CURRENT STATUS: HCPCS

## 2017-03-28 PROCEDURE — 97162 PT EVAL MOD COMPLEX 30 MIN: CPT
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
## 2017-03-31 ENCOUNTER — HOSPITAL ENCOUNTER (OUTPATIENT)
Dept: PHYSICAL THERAPY | Age: 67
Discharge: HOME | End: 2017-03-31
Payer: MEDICARE

## 2017-03-31 PROCEDURE — 97110 THERAPEUTIC EXERCISES: CPT

## 2017-03-31 PROCEDURE — 97016 VASOPNEUMATIC DEVICE THERAPY: CPT

## 2017-03-31 NOTE — PROGRESS NOTES
PT DAILY TREATMENT NOTE - [facility]     Patient Name: Angel Wiseman  Date:3/31/2017  : 1950  [x]  Patient  Verified  [place]or: [place] MED[place]ARE / Plan: [place] MEDICARE PART A & B / Product Type: Medicare /    In time:8:32  Out time:9:38  Total Treatment Time (min): 64  Total Timed Codes (min):49  1:1 Treatment Time [facility] only):45  Visit #: 2 of 18    Treatment Area: Pain in left shoulder [M25.512]    SUBJECTIVE  Pain Level (0-10 scale): 1-2/10 shoulder  Any medication changes, allergies to medications, adverse drug reactions, diagnosis change, or new procedure performed?: [x] No    [] Yes (see summary sheet for update)  Subjective functional status/changes:   [] No changes reported  Pt reports that he has had chronic neck pain. He was taking neurotin and gabapentin and that masked it, but the neck pain has been worse since he fell. He has an eval scheduled for therapy next week for his neck since the new script is from a different MD (Dr. Matt Molian).       OBJECTIVE    Modality rationale: decrease inflammation and decrease pain to improve the patients ability to tolerate daily tasks    Min Type Additional Details    [] Estim:  []Unatt       []IFC  []Premod                        []Other:  []w/ice   []w/heat  Position:  Location:    [] Estim: []Att    []TENS instruct  []NMES                    []Other:  []w/US   []w/ice   []w/heat  Position:  Location:    []  Traction: [] Cervical       []Lumbar                       [] Prone          []Supine                       []Intermittent   []Continuous Lbs:  [] before manual  [] after manual    []  Ultrasound: []Continuous   [] Pulsed                           []1MHz   []3MHz W/cm2:  Location:    []  Iontophoresis with dexamethasone         Location: [] Take home patch   [] In clinic   During Game Ready []  Ice     [x]  heat  []  Ice massage  []  Laser   []  Anodyne Position: seated  Location: neck     []  Laser with stim  []  Other:  Position:  Location:   15 [x]  Vasopneumatic Device Pressure:       [x] lo [] med [] hi   Temperature: [x] lo [] med [] hi   [x] Skin assessment post-treatment:  [x]intact []redness- no adverse reaction    []redness - adverse reaction:     49 min Therapeutic Exercise:  [x] See flow sheet :   Rationale: increase ROM and increase strength to improve the patients ability to improve ease of household tasks             With   [x] TE   [] TA   [] neuro   [] other: Patient Education: [x] Review HEP    [] Progressed/Changed HEP based on:   [] positioning   [] body mechanics   [] transfers   [] heat/ice application    [x] other: educated patient regarding new therex technique and purpose,  Gave pt updated HEP and instructed him to continue shoulder iso x 6 daily       Other Objective/Functional Measures:     Pain with end-range wall slides to right during arcs, instructed to perform in pain-free range  Slight discomfort during wall push up, severe pain with lowering arm from wall push ups that subsided within seconds  No difficulty with attempting TB rows with green TB, changed to blue TB with good resistance for rows  Challenged with blue TB with extension with slight pain over ribs but pt requested to continue, pain ceased immediately upon lowering   Verbal cues to avoid trunk movement with shoulder iso x 6    Slight back pain with table TB walks with l/s in slight flexion, raised table height and no back pain  No increased shoulder pain after therapy  Pt reported slight neck/back discomfort after therapy     Pain Level (0-10 scale) post treatment: 0/10 shoulder    ASSESSMENT/Changes in Function:     Initiated treatment per POC. Pt was motivated in therapy and put forth good effort with therex. He reported some pain throughout therapy but always requested to continue, and increased pain subsided with rest/ice. Will continue to address strength, ROM, and shoulder stability deficits for improved ease of functional tasks.      Patient will continue to benefit from skilled PT services to modify and progress therapeutic interventions, address ROM deficits, address strength deficits, analyze and address soft tissue restrictions, analyze and cue movement patterns, assess and modify postural abnormalities and instruct in home and community integration to attain remaining goals. Progress towards goals / Updated goals:  Short Term Goals: To be accomplished in 1 weeks:  1. Pt will be compliant with initial HEP to improve therapy outcomes Progressing. Intermittent compliance 3/3117   Long Term Goals: To be accomplished in 6 weeks:  1. Pt will improve FOTO by 21 points in order to demonstrate functional improvement   2. Pt will improve L shoulder strength to 4+/5 in order to improve ease of yard work  3. Pt will improve L shoulder flexion/abduction AROM to >140 dgrs in order to improve ease of trimming bushes  4.  Pt will report < 3/10 average pain in order to improve QOL     PLAN  []  Upgrade activities as tolerated     [x]  Continue plan of care  []  Update interventions per flow sheet       []  Discharge due to:_  []  Other:_      Brock Peterson, PT 3/31/2017  8:41 AM    Future Appointments  Date Time Provider Patrica Grider   4/5/2017 5:00 PM Sarahann Cheese, PT [id number] [facility]   4/7/2017 4:30 PM Sarahann Cheese, PT [id number] [facility]   4/10/2017 8:00 AM Sarahann Cheese, PT [id number] [facility]   4/12/2017 4:00 PM Sarahann Cheese, PT [email or web address] [facility]   4/14/2017 8:00 AM Canelo Arabia [id number]   4/17/2017 8:30 AM Sarahann Cheese, PT [id number] [facility]   4/19/2017 8:30 AM Sarahann Cheese, PT [id number] [facility]   4/21/2017 8:30 AM Sarahann Cheese, PT JLQARYR [facility]   4/24/2017 8:30 AM Sarahann Cheese, PT [id number] [facility]   4/26/2017 8:30 AM Sarahann Cheese, PT [id number] [facility]   4/28/2017 8:30 AM Sarahann Cheese, PT NIASUNA [facility]   5/1/2017 8:30 AM Sarahann Cheese, PT [id number] [facility]   5/3/2017 8:30 AM Sarahann Cheese, PT [id number] [facility]   5/5/2017 8:30 AM Sarrafatn Cheese, PT JILSTXU [facility]   5/8/2017 8:30 AM Bryon Brown, PT [id number] [facility]   5/10/2017 8:30 AM Bryon Brown, PT [id number] [facility]   5/12/2017 8:30 AM Bryon Brown PT [id number] [facility]   2/22/2018 7:45 AM [facility] NURSE VISIT [facility]   3/1/2018 8:30 AM Meredith Cali MD [facility]

## 2017-04-05 ENCOUNTER — HOSPITAL ENCOUNTER (OUTPATIENT)
Dept: PHYSICAL THERAPY | Age: 67
Discharge: HOME | End: 2017-04-05
Payer: MEDICARE

## 2017-04-05 PROCEDURE — 97110 THERAPEUTIC EXERCISES: CPT

## 2017-04-05 NOTE — PROGRESS NOTES
PT DAILY TREATMENT NOTE - [facility]    Patient Name: Jose Choi  Date:2017  : 1950  [x]  Patient  Verified  Payor: [place] MEDICARE / Plan: [place] MEDICARE PART A & B / Product Type: Medicare /    In time: 5:00  Out time: 5:40  Total Treatment Time (min):  40  Total Timed Codes (min):  30  1:1 Treatment Time ( [address] only): 25   Visit #: 3 of 18    Treatment Area: Pain in left shoulder [M25.512]    SUBJECTIVE  Pain Level (0-10 scale):  1/10  Any medication changes, allergies to medications, adverse drug reactions, diagnosis change, or new procedure performed?: [x] No    [] Yes (see summary sheet for update)  Subjective functional status/changes:   [] No changes reported  Pt. Reports he put in some mulch so is sore in neck and back.      OBJECTIVE    Modality rationale: decrease inflammation and decrease pain to improve the patients ability to increase ease of ADLs   Min Type Additional Details    [] Estim:  []Unatt       []IFC  []Premod                        []Other:  []w/ice   []w/heat  Position:  Location:    [] Estim: []Att    []TENS instruct  []NMES                    []Other:  []w/US   []w/ice   []w/heat  Position:  Location:    []  Traction: [] Cervical       []Lumbar                       [] Prone          []Supine                       []Intermittent   []Continuous Lbs:  [] before manual  [] after manual    []  Ultrasound: []Continuous   [] Pulsed                           []1MHz   []3MHz Location:  W/cm2:    []  Iontophoresis with dexamethasone         Location: [] Take home patch   [] In clinic   10 [x]  Ice     []  heat  []  Ice massage  []  Laser   []  Anodyne Position: seated  Location: L shoulder    []  Laser with stim  []  Other: Position:  Location:    []  Vasopneumatic Device Pressure:       [] lo [] med [] hi   Temperature: [] lo [] med [] hi   [x] Skin assessment post-treatment:  [x]intact []redness- no adverse reaction    []redness - adverse reaction:     30 min Therapeutic Exercise:  [x] See flow sheet :   Rationale: increase ROM and increase strength to improve the patients ability to increase ease of ADLs          With   [x] TE   [] TA   [] neuro   [] other: Patient Education: [x] Review HEP    [] Progressed/Changed HEP based on:   [] positioning   [] body mechanics   [] transfers   [] heat/ice application    [] other:      Other Objective/Functional Measures:   L shoulder AROM flex:140 abd: 115 degrees  Pt. Tolerated PT well but had some increase in pain therex  He was challenged with ball on wall ABCs  He required cues to keep elbow at side during IR/ER     Pain Level (0-10 scale) post treatment: 1/10    ASSESSMENT/Changes in Function: pt. Is progressing slowly towards goals. He is having less pain and was able to mulch a flower bed at home. He continues to have pain with overhead activities. Patient will continue to benefit from skilled PT services to modify and progress therapeutic interventions, address functional mobility deficits, address ROM deficits, address strength deficits, analyze and address soft tissue restrictions, analyze and cue movement patterns and analyze and modify body mechanics/ergonomics to attain remaining goals. Progress towards goals / Updated goals:  Short Term Goals: To be accomplished in 1 weeks:  1. Pt will be compliant with initial HEP to improve therapy outcomes Progressing. Intermittent compliance 3/3117     Long Term Goals: To be accomplished in 6 weeks:  1. Pt will improve FOTO by 21 points in order to demonstrate functional improvement   2. Pt will improve L shoulder strength to 4+/5 in order to improve ease of yard work  3. Pt will improve L shoulder flexion/abduction AROM to >140 dgrs in order to improve ease of trimming bushes  4.  Pt will report < 3/10 average pain in order to improve QOL     PLAN  []  Upgrade activities as tolerated     [x]  Continue plan of care  []  Update interventions per flow sheet       []  Discharge due to:_  []  Other:_ Baron Barnard, PT 4/5/2017  5:00 PM

## 2017-04-07 ENCOUNTER — HOSPITAL ENCOUNTER (OUTPATIENT)
Dept: PHYSICAL THERAPY | Age: 67
Discharge: HOME | End: 2017-04-07
Payer: MEDICARE

## 2017-04-07 PROCEDURE — 97161 PT EVAL LOW COMPLEX 20 MIN: CPT

## 2017-04-07 PROCEDURE — G8982 BODY POS GOAL STATUS: HCPCS

## 2017-04-07 PROCEDURE — 97110 THERAPEUTIC EXERCISES: CPT

## 2017-04-07 PROCEDURE — G8981 BODY POS CURRENT STATUS: HCPCS

## 2017-04-07 PROCEDURE — G8983 BODY POS D/C STATUS: HCPCS
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
## 2017-04-10 ENCOUNTER — HOSPITAL ENCOUNTER (OUTPATIENT)
Dept: PHYSICAL THERAPY | Age: 67
Discharge: HOME | End: 2017-04-10
Payer: MEDICARE

## 2017-04-10 PROCEDURE — 97110 THERAPEUTIC EXERCISES: CPT

## 2017-04-10 NOTE — PROGRESS NOTES
PT DAILY TREATMENT NOTE - [facility] 3-16    Patient Name: Yariel Portillo  Date:4/10/2017  : 1950  [x]  Patient  Verified  Payor: [place] MEDICARE / Plan: VA MEDICARE PART A & B / Product Type: Medicare /    In time: 8:00  Out time: 8:37  Total Treatment Time (min):  37  Total Timed Codes (min):  37  1:1 Treatment Time ( [address] only): 40   Visit #: 4 of 18    Treatment Area: Pain in left shoulder [M25.512]    SUBJECTIVE  Pain Level (0-10 scale):  3/10  Any medication changes, allergies to medications, adverse drug reactions, diagnosis change, or new procedure performed?: [x] No    [] Yes (see summary sheet for update)  Subjective functional status/changes:   [] No changes reported  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 Pt. Reports the neck exercises are going good. OBJECTIVE    32 min Therapeutic Exercise:  [x] See flow sheet :   Rationale: increase ROM and increase strength to improve the patients ability to increase ease of ADLs     5 min Manual Therapy:   L cervical PAIVMs, cervical distraction   Rationale: decrease pain, increase ROM and increase tissue extensibility to increase ease of driving    With   [x] TE   [] TA   [] neuro   [] other: Patient Education: [x] Review HEP    [] Progressed/Changed HEP based on:   [] positioning   [] body mechanics   [] transfers   [] heat/ice application    [] other:      Other Objective/Functional Measures:   Pt. Continues to have pain with L cervical AROM  He had some pain with supine chin tucks and retractions   [facility] push ups   Pt. Had elbow pain with wall abduction but no pain after switching to finger ladder    Pain Level (0-10 scale) post treatment: 3/10    ASSESSMENT/Changes in Function:  Pt. Is progressing slowly towards goals. He continues to have decreased L cervical rotation mobility and decreased L shoulder strength.      Patient will continue to benefit from skilled PT services to modify and progress therapeutic interventions, address functional mobility deficits, address ROM deficits, address strength deficits, analyze and address soft tissue restrictions, analyze and cue movement patterns, analyze and modify body mechanics/ergonomics and assess and modify postural abnormalities to attain remaining goals. Progress towards goals / Updated goals:  Short Term Goals: To be accomplished in 1 weeks:  1. Pt will be compliant with initial HEP to improve therapy outcomes Progressing. Intermittent compliance 3/3117      Long Term Goals: To be accomplished in 6 weeks:  1. Pt will improve FOTO by 21 points in order to demonstrate functional improvement   2. Pt will improve L shoulder strength to 4+/5 in order to improve ease of yard work  3. Pt will improve L shoulder flexion/abduction AROM to >140 dgrs in order to improve ease of trimming bushes  4. Pt will report < 3/10 average pain in order to improve QOL     Updated goal  5.  Patient will improve L cervical rotation AROM to 55 degrees in order to increase ease of ADLs    PLAN  []  Upgrade activities as tolerated     [x]  Continue plan of care  []  Update interventions per flow sheet       []  Discharge due to:_  []  Other:_      Krystian Darling, PT 4/10/2017  8:00 AM

## 2017-04-12 ENCOUNTER — HOSPITAL ENCOUNTER (OUTPATIENT)
Dept: PHYSICAL THERAPY | Age: 67
Discharge: HOME | End: 2017-04-12
Payer: MEDICARE

## 2017-04-12 PROCEDURE — 97110 THERAPEUTIC EXERCISES: CPT

## 2017-04-12 PROCEDURE — 97140 MANUAL THERAPY 1/> REGIONS: CPT

## 2017-04-12 NOTE — PROGRESS NOTES
PT DAILY TREATMENT NOTE - [facility] 3-16    Patient Name: Jorge L Guerrero  Date:2017  : 1950  [x]  Patient  Verified  Payor: Jeramie Bars / Plan: [place] MEDICARE PART A & B / Product Type: Medicare /    In time: 4:00  Out time: 4:40  Total Treatment Time (min):  40  Total Timed Codes (min): 30     Visit #: 5 of 18    Treatment Area: Pain in left shoulder [M25.512]    SUBJECTIVE  Pain Level (0-10 scale): 1/10  Any medication changes, allergies to medications, adverse drug reactions, diagnosis change, or new procedure performed?: [x] No    [] Yes (see summary sheet for update)  Subjective functional status/changes:   [] No changes reported   pt. Reports he had a lot of upper L side neck pain this morning but then it calmed down as the day went on.      OBJECTIVE    Modality rationale: decrease pain and increase tissue extensibility to improve the patients ability to increase ease of ADLs   Min Type Additional Details    [] Estim:  []Unatt       []IFC  []Premod                        []Other:  []w/ice   []w/heat  Position:  Location:    [] Estim: []Att    []TENS instruct  []NMES                    []Other:  []w/US   []w/ice   []w/heat  Position:  Location:    []  Traction: [] Cervical       []Lumbar                       [] Prone          []Supine                       []Intermittent   []Continuous Lbs:  [] before manual  [] after manual    []  Ultrasound: []Continuous   [] Pulsed                           []1MHz   []3MHz Location:  W/cm2:    []  Iontophoresis with dexamethasone         Location: [] Take home patch   [] In clinic   10 []  Ice     [x]  heat  []  Ice massage  []  Laser   []  Anodyne Position: seated  Location: neck    []  Laser with stim  []  Other: Position:  Location:    []  Vasopneumatic Device Pressure:       [] lo [] med [] hi   Temperature: [] lo [] med [] hi   [x] Skin assessment post-treatment:  [x]intact []redness- no adverse reaction    []redness - adverse reaction:     20 min Therapeutic Exercise:  [x] See flow sheet :   Rationale: increase ROM and increase strength to improve the patients ability to increase ease of ADLs    10 min Manual Therapy:  L cervical PAIVMs, cervical distraction   Rationale: decrease pain, increase ROM and increase tissue extensibility to increase ease of driving          With   [x] TE   [] TA   [] neuro   [] other: Patient Education: [x] Review HEP    [] Progressed/Changed HEP based on:   [] positioning   [] body mechanics   [] transfers   [] heat/ice application    [] other:      Other Objective/Functional Measures:   L cervical rotation AROM: 59 degrees  L shoulder AROM flex: 140 abd: 144 degrees   Pt. Was challenged with supine chin tucks with towel and could only perform 6x with pain at end  FOTO: 58 points    Pain Level (0-10 scale) post treatment:  1/10    ASSESSMENT/Changes in Function:  Pt. Is progressing with physical therapy. He demonstrates improving cervical and shoulder mobility. He also has improving FOTO score    Patient will continue to benefit from skilled PT services to modify and progress therapeutic interventions, address functional mobility deficits, address ROM deficits, address strength deficits, analyze and address soft tissue restrictions, analyze and cue movement patterns and analyze and modify body mechanics/ergonomics to attain remaining goals. Progress towards goals / Updated goals:  Short Term Goals: To be accomplished in 1 weeks:  1. Pt will be compliant with initial HEP to improve therapy outcomes Progressing. Intermittent compliance 3/3117       Long Term Goals: To be accomplished in 6 weeks:  1. Pt will improve FOTO by 21 points in order to demonstrate functional improvement   2. Pt will improve L shoulder strength to 4+/5 in order to improve ease of yard work  3. Pt will improve L shoulder flexion/abduction AROM to >140 dgrs in order to improve ease of trimming bushes  Met: flexion: 140 abd: 145 degrees (4/12/17)  4.  Pt will report < 3/10 average pain in order to improve QOL      Updated goal  5.  Patient will improve L cervical rotation AROM to 55 degrees in order to increase ease of ADLs  Progressing: mobility improved to 59 degrees but painful at end range (4/12/17)    PLAN  []  Upgrade activities as tolerated     [x]  Continue plan of care  []  Update interventions per flow sheet       []  Discharge due to:_  []  Other:_      Aida Lam, PT 4/12/2017  4:03 PM

## 2017-04-14 ENCOUNTER — HOSPITAL ENCOUNTER (OUTPATIENT)
Dept: PHYSICAL THERAPY | Age: 67
Discharge: HOME | End: 2017-04-14
Payer: MEDICARE

## 2017-04-14 PROCEDURE — 97110 THERAPEUTIC EXERCISES: CPT

## 2017-04-14 NOTE — PROGRESS NOTES
PT DAILY TREATMENT NOTE - [facility]    Patient Name: Jimbo Ruiz  Date:2017  : 1950  [x]  Patient  Verified  Payor: Bobby Vo / Plan: [place] MEDICARE PART A & B / Product Type: Medicare /    In time:9:30  Out time:10:15  Total Treatment Time (min): 45  Total Timed Codes (min): 35  1:1 Treatment Time ( [address] only): 30  Visit #: 6 of 18    Treatment Area: Pain in left shoulder [M25.512]    SUBJECTIVE  Pain Level (0-10 scale): 2-neck. 1-left shoulder  Any medication changes, allergies to medications, adverse drug reactions, diagnosis change, or new procedure performed?: [x] No    [] Yes (see summary sheet for update)  Subjective functional status/changes:   [] No changes reported  \"The shoulder hurts some. My neck has been bothering me. \"    OBJECTIVE  Modality rationale: decrease pain and increase tissue extensibility to improve the patients ability to ease ADL tolerance   Min Type Additional Details    [] Estim:  []Unatt       []IFC  []Premod                        []Other:  []w/ice   []w/heat  Position:  Location:    [] Estim: []Att    []TENS instruct  []NMES                    []Other:  []w/US   []w/ice   []w/heat  Position:  Location:    []  Traction: [] Cervical       []Lumbar                       [] Prone          []Supine                       []Intermittent   []Continuous Lbs:  [] before manual  [] after manual    []  Ultrasound: []Continuous   [] Pulsed                           []1MHz   []3MHz Location:  W/cm2:    []  Iontophoresis with dexamethasone         Location: [] Take home patch   [] In clinic   10 []  Ice     [x]  heat  []  Ice massage  []  Laser   []  Anodyne Position: seated  Location: left shoulder    []  Laser with stim  []  Other: Position:  Location:    []  Vasopneumatic Device Pressure:       [] lo [] med [] hi   Temperature: [] lo [] med [] hi   [] Skin assessment post-treatment:  []intact []redness- no adverse reaction    []redness - adverse reaction:     35 min Therapeutic Exercise:  [x] See flow sheet :   Rationale: increase ROM, increase strength and improve coordination to improve the patients ability to perform ADLs            With   [] TE   [] TA   [] neuro   [] other: Patient Education: [x] Review HEP    [] Progressed/Changed HEP based on:   [] positioning   [] body mechanics   [] transfers   [] heat/ice application    [] other:      Other Objective/Functional Measures:   Tactile cues to engage middle trap and rhomboids with theraband rows/ext   Cues for controlled ABC reps as patient demonstrates rushed letter form  Requires cues to maintain on task     Pain Level (0-10 scale) post treatment: 3    ASSESSMENT/Changes in Function: Patient is progressing in strength, left shoulder MMT grossly 4+/5, will continue POC. Patient will continue to benefit from skilled PT services to modify and progress therapeutic interventions, address functional mobility deficits, address ROM deficits, address strength deficits, analyze and address soft tissue restrictions, analyze and cue movement patterns, analyze and modify body mechanics/ergonomics and assess and modify postural abnormalities to attain remaining goals. []  See Plan of Care  []  See progress note/recertification  []  See Discharge Summary         Progress towards goals / Updated goals:  Short Term Goals: To be accomplished in 1 weeks:  1. Pt will be compliant with initial HEP to improve therapy outcomes Progressing. Intermittent compliance 3/3117       Long Term Goals: To be accomplished in 6 weeks:  1. Pt will improve FOTO by 21 points in order to demonstrate functional improvement   2. Pt will improve L shoulder strength to 4+/5 in order to improve ease of yard work-met, left shoulder is grossly 4+/5 on 4/14/2017  3. Pt will improve L shoulder flexion/abduction AROM to >140 dgrs in order to improve ease of trimming bushes  Met: flexion: 140 abd: 145 degrees (4/12/17)  4.  Pt will report < 3/10 average pain in order to improve QOL       Updated goal  5.  Patient will improve L cervical rotation AROM to 55 degrees in order to increase ease of ADLs  Progressing: mobility improved to 59 degrees but painful at end range (4/12/17)    PLAN  []  Upgrade activities as tolerated     [x]  Continue plan of care  []  Update interventions per flow sheet       []  Discharge due to:_  []  Other:_      Luis Mason 4/14/2017  7:54 AM    Future Appointments  Date Time Provider Patrica Grider   4/14/2017 9:30 AM Luis Mason [email or web address] [address]   4/17/2017 10:00 AM Jessica Passe, PT [email or web address] [address]   4/19/2017 8:30 AM Jessica Passe, PT [email or web address] [address]   4/21/2017 8:30 AM Jessica Passe, PT [email or web address] [address]   4/24/2017 8:30 AM Jessica Passe, PT [email or web address] [address]   4/26/2017 8:30 AM Jessica Passe, PT [email or web address] [address]   4/28/2017 8:30 AM Jessica Passe, PT [email or web address] [address]   5/1/2017 8:30 AM Jessica Passe, PT [email or web address] [address]   5/3/2017 8:30 AM Jessica Passe, PT [email or web address] [address]   5/5/2017 8:30 AM Jessica Passe, PT [email or web address] [address]   5/8/2017 8:30 AM Jessica Passe, PT [email or web address] [address]   5/10/2017 8:30 AM Jessica Passe, PT [email or web address] [address]   5/12/2017 8:30 AM Jessica Passe, PT [email or web address] [address]   2/22/2018 7:45 AM [facility] NURSE VISIT C STEVE JACOB   3/1/2018 8:30 AM Peggy Greenberg MD [facility]

## 2017-04-17 ENCOUNTER — HOSPITAL ENCOUNTER (OUTPATIENT)
Dept: PHYSICAL THERAPY | Age: 67
Discharge: HOME | End: 2017-04-17
Payer: MEDICARE

## 2017-04-17 PROCEDURE — 97140 MANUAL THERAPY 1/> REGIONS: CPT

## 2017-04-17 PROCEDURE — 97110 THERAPEUTIC EXERCISES: CPT

## 2017-04-17 NOTE — PROGRESS NOTES
PT DAILY TREATMENT NOTE - [facility]     Patient Name: Wilian Mehta  Date:2017  : 1950  [x]  Patient  Verified  Payor: Itz Car / Plan: [place] MED[place] & [place] / Product Type: Medicare /    In time:10:00  Out time:10:40  Total Treatment Time (min): 40  Total Timed Codes (min): 40  1:1 Treatment Time ( [address] only): 30   Visit #: 7 of 18    Treatment Area: Pain in left shoulder [M25.512]    SUBJECTIVE  Pain Level (0-10 scale): 2/10 in neck, 1/10 in shoulder  Any medication changes, allergies to medications, adverse drug reactions, diagnosis change, or new procedure performed?: [x] No    [] Yes (see summary sheet for update)  Subjective functional status/changes:   [] No changes reported  Pt states that he was in a lot of pain after last treatment session and that he was in pain all weekend. His pain in the neck is not bad when he first wakes up but as he starts to move around it gets worse. He feels that PT is helping with the shoulder but it is making his neck worse. OBJECTIVE    Modality rationale: decrease inflammation and decrease pain to improve the patients ability to perform ADLs with increased ease.    Min Type Additional Details    [] Estim:  []Unatt       []IFC  []Premod                        []Other:  []w/ice   []w/heat  Position:  Location:    [] Estim: []Att    []TENS instruct  []NMES                    []Other:  []w/US   []w/ice   []w/heat  Position:  Location:    []  Traction: [] Cervical       []Lumbar                       [] Prone          []Supine                       []Intermittent   []Continuous Lbs:  [] before manual  [] after manual    []  Ultrasound: []Continuous   [] Pulsed                           []1MHz   []3MHz W/cm2:  Location:    []  Iontophoresis with dexamethasone         Location: [] Take home patch   [] In clinic   10 [x]  Ice     []  heat  []  Ice massage  []  Laser   []  Anodyne Position: seated  Location: L shoulder    []  Laser with stim  []  Other: Position:  Location:    []  Vasopneumatic Device Pressure:       [] lo [] med [] hi   Temperature: [] lo [] med [] hi   [x] Skin assessment post-treatment:  [x]intact []redness- no adverse reaction    []redness - adverse reaction:     20 min Therapeutic Exercise:  [x] See flow sheet :   Rationale: increase ROM and increase strength to improve the patients ability to perform ADLs with increased ease    10 min Manual Therapy:  SOR, trigger point release cervical paraspinals, L cervical PAIVMs   Rationale: decrease pain and increase tissue extensibility to perform ADLs with increased ease. With   [x] TE   [] TA   [] neuro   [] other: Patient Education: [x] Review HEP    [] Progressed/Changed HEP based on:   [] positioning   [] body mechanics   [] transfers   [] heat/ice application    [x] other:      Other Objective/Functional Measures:   Pt had pain in the neck for most of the day after last treatment and over the weekend. Decreased resistive therex this visit, performed AROM to help with decrease pain. Pain decreases to 0/10 with manual to neck. Pain Level (0-10 scale) post treatment: 1/10 in neck    ASSESSMENT/Changes in Function:   Pt is progressing well with shoulder strength and ROM but continues to have increased/fluctuating pain with neck. Patient will continue to benefit from skilled PT services to modify and progress therapeutic interventions, address functional mobility deficits, address ROM deficits, address strength deficits, analyze and address soft tissue restrictions, analyze and cue movement patterns, analyze and modify body mechanics/ergonomics and assess and modify postural abnormalities to attain remaining goals. Progress towards goals / Updated goals:  Short Term Goals: To be accomplished in 1 weeks:  1. Pt will be compliant with initial HEP to improve therapy outcomes Progressing. Intermittent compliance 3/3117       Long Term Goals:  To be accomplished in 6 weeks: 1. Pt will improve FOTO by 21 points in order to demonstrate functional improvement   2. Pt will improve L shoulder strength to 4+/5 in order to improve ease of yard work-met, left shoulder is grossly 4+/5 on 4/14/2017  3. Pt will improve L shoulder flexion/abduction AROM to >140 dgrs in order to improve ease of trimming bushes  Met: flexion: 140 abd: 145 degrees (4/12/17)  4. Pt will report < 3/10 average pain in order to improve QOL       Updated goal  5.  Patient will improve L cervical rotation AROM to 55 degrees in order to increase ease of ADLs  Progressing: mobility improved to 59 degrees but painful at end range (4/12/17)    PLAN  []  Upgrade activities as tolerated     [x]  Continue plan of care  []  Update interventions per flow sheet       []  Discharge due to:_  []  Other:_      Carlos Plaza 4/17/2017  10:55 AM    Future Appointments  Date Time Provider Patrica Grider   4/19/2017 8:30 AM [facility], PT [email or web address] [facility]   4/21/2017 8:30 AM [facility], PT [email or web address] [facility]   4/24/2017 8:30 AM [facility], PT [email or web address] [facility]   4/26/2017 8:30 AM [facility], PT [email or web address] [facility]   4/28/2017 8:30 AM [facility], PT [email or web address] [facility]   5/1/2017 8:30 AM [facility], PT [email or web address] [facility]   5/3/2017 8:30 AM [facility], PT [email or web address] [facility]   5/5/2017 8:30 AM [facility], PT [email or web address] [facility]   5/8/2017 8:30 AM [facility], PT [email or web address] [facility]   5/10/2017 8:30 AM [facility], PT [email or web address] [facility]   5/12/2017 8:30 AM Avi Amanda, PT [email or web address] [facility]   2/22/2018 7:45 AM IOC NURSE VISIT [facility]   3/1/2018 8:30 AM Neo Donahue MD [facility]

## 2017-04-19 ENCOUNTER — HOSPITAL ENCOUNTER (OUTPATIENT)
Dept: PHYSICAL THERAPY | Age: 67
Discharge: HOME | End: 2017-04-19
Payer: MEDICARE

## 2017-04-19 PROCEDURE — 97110 THERAPEUTIC EXERCISES: CPT

## 2017-04-19 PROCEDURE — 97140 MANUAL THERAPY 1/> REGIONS: CPT

## 2017-04-19 NOTE — PROGRESS NOTES
PT DAILY TREATMENT NOTE - [facility]     Patient Name: Negro Pena  Date:2017  : 1950  [x]  Patient  Verified  Payor: Sultana Chamber / Plan: [place] MEDICARE PART A & B / Product Type: Medicare /    In time:8:30  Out time:9:16  Total Treatment Time (min): 46  Total Timed Codes (min): 36  1:1 Treatment Time ([facility] only): 30   Visit #: 8 of 18    Treatment Area: Pain in left shoulder [M25.512]    SUBJECTIVE  Pain Level (0-10 scale): 0/10 c/s, 1/10 scap  Any medication changes, allergies to medications, adverse drug reactions, diagnosis change, or new procedure performed?: [x] No    [] Yes (see summary sheet for update)  Subjective functional status/changes:   [] No changes reported  Pt felt much better after last visit and had relief for most of the day but the stiffness has returned today. OBJECTIVE    Modality rationale: decrease inflammation and decrease pain to improve the patients ability to perform ADLs with increased ease.     Min Type Additional Details    [] Estim:  []Unatt       []IFC  []Premod                        []Other:  []w/ice   []w/heat  Position:  Location:    [] Estim: []Att    []TENS instruct  []NMES                    []Other:  []w/US   []w/ice   []w/heat  Position:  Location:    []  Traction: [] Cervical       []Lumbar                       [] Prone          []Supine                       []Intermittent   []Continuous Lbs:  [] before manual  [] after manual    []  Ultrasound: []Continuous   [] Pulsed                           []1MHz   []3MHz W/cm2:  Location:    []  Iontophoresis with dexamethasone         Location: [] Take home patch   [] In clinic   10 []  Ice     [x]  heat  []  Ice massage  []  Laser   []  Anodyne Position: seated  Location: L shoulder    []  Laser with stim  []  Other:  Position:  Location:    []  Vasopneumatic Device Pressure:       [] lo [] med [] hi   Temperature: [] lo [] med [] hi   [x] Skin assessment post-treatment:  [x]intact []redness- no adverse reaction    []redness - adverse reaction:       27 min Therapeutic Exercise:  [x] See flow sheet :   Rationale: increase ROM and increase strength to improve the patients ability to perform functional activities with increased ease. 9 min Manual Therapy:  SOR, trigger point release cervical paraspinals, L cervical PAIVMs    Rationale: decrease pain and increase ROM to perform ADLs with increased ease and increased safety. With   [x] TE   [] TA   [] neuro   [] other: Patient Education: [x] Review HEP    [] Progressed/Changed HEP based on:   [] positioning   [] body mechanics   [] transfers   [] heat/ice application    [x] other:      Other Objective/Functional Measures:   Pain and tightness with  Cervical extension and rotation to L with AROM therex  Pt has pain when perform abd wall slides in forearm down to wrist  Pt instructed to decrease resistance of TB to green with HEP to decrease neck pain. Pain Level (0-10 scale) post treatment: 0/10 c/s, 1/10 scap    ASSESSMENT/Changes in Function:   Pt shoulder ROM and strength is increasing but pt continues to have pain in the abd/scaption plane with AROM. Pain fluctuates in neck but is relieved with manual therapy. Patient will continue to benefit from skilled PT services to modify and progress therapeutic interventions, address functional mobility deficits, address ROM deficits, address strength deficits, analyze and address soft tissue restrictions, analyze and cue movement patterns, analyze and modify body mechanics/ergonomics and assess and modify postural abnormalities to attain remaining goals. Progress towards goals / Updated goals:  Short Term Goals: To be accomplished in 1 weeks:  1. Pt will be compliant with initial HEP to improve therapy outcomes Met (4/19/17)      Long Term Goals: To be accomplished in 6 weeks:  1. Pt will improve FOTO by 21 points in order to demonstrate functional improvement   2.  Pt will improve L shoulder strength to 4+/5 in order to improve ease of yard work-met, left shoulder is grossly 4+/5 on 4/14/2017  3. Pt will improve L shoulder flexion/abduction AROM to >140 dgrs in order to improve ease of trimming bushes  Met: flexion: 140 abd: 145 degrees (4/12/17)  4. Pt will report < 3/10 average pain in order to improve QOL       Updated goal  5. Patient will improve L cervical rotation AROM to 55 degrees in order to increase ease of ADLs  Progressing: mobility improved to 59 degrees but painful at end range (4/12/17)    PLAN  []  Upgrade activities as tolerated     [x]  Continue plan of care  []  Update interventions per flow sheet       []  Discharge due to:_  []  Other:_      Kalina Ends 4/19/2017  8:05 AM  I was present during the entire treatment, directing and participating in the treatment.    Reymundo Dakins DPT      Future Appointments  Date Time Provider Patrica Grider   4/19/2017 8:30 AM Reymundo Dakins, PT [email or web address] [facility]   4/21/2017 8:30 AM Reymundo Dakins, PT [email or web address] [facility]   4/24/2017 8:30 AM Reymundo Dakins, PT APHEWLL [facility]   4/26/2017 8:30 AM Reymundo Dakins, PT [id number] [facility]   4/28/2017 8:30 AM Reymundo Dakins, PT [email or web address] [facility]   5/1/2017 8:30 AM Reymundo Dakins, PT [email or web address] [facility]   5/3/2017 8:30 AM Reymundo Dakins, PT [email or web address] [facility]   5/5/2017 8:30 AM Reymundo Dakins, PT [email or web address] [facility]   5/8/2017 8:30 AM Reymundo Dakins, PT [email or web address] [facility]   5/10/2017 8:30 AM Reymundo Dakins, PT [email or web address] [facility]   5/12/2017 8:30 AM Reymundo Dakins, PT [email or web address] [facility]   2/22/2018 7:45 AM [facility]   3/1/2018 8:30 AM Jeff Easley MD [facility]

## 2017-04-21 ENCOUNTER — HOSPITAL ENCOUNTER (OUTPATIENT)
Dept: PHYSICAL THERAPY | Age: 67
Discharge: HOME | End: 2017-04-21
Payer: MEDICARE

## 2017-04-21 PROCEDURE — 97140 MANUAL THERAPY 1/> REGIONS: CPT

## 2017-04-21 PROCEDURE — 97110 THERAPEUTIC EXERCISES: CPT

## 2017-04-21 NOTE — PROGRESS NOTES
PT DAILY TREATMENT NOTE - [facility]     Patient Name: Jose Martin Matos  Date:2017  : 1950  [x]  Patient  Verified  Payor: Wendy Gallagher / Plan: [place] MEDICARE PART A & B / Product Type: Medicare /    In time:8:30  Out time:9:00  Total Treatment Time (min): 30  Total Timed Codes (min): 30  1:1 Treatment Time ( [address] only): 30   Visit #: 9 of 18    Treatment Area: Pain in left shoulder [M25.512]    SUBJECTIVE  Pain Level (0-10 scale): 0/10 neck and shoulder  Any medication changes, allergies to medications, adverse drug reactions, diagnosis change, or new procedure performed?: [x] No    [] Yes (see summary sheet for update)  Subjective functional status/changes:   [] No changes reported  Pt states that he feels good today. His neck is not as bad and he did back down on the exercises and the pain is not as bad. OBJECTIVE      22 min Therapeutic Exercise:  [x] See flow sheet :   Rationale: increase ROM and increase strength to improve the patients ability to ADLs with increased ease. 8 min Manual Therapy:  SOR, trigger point release cervical paraspinals, L cervical PAIVMs    Rationale: decrease pain and increase ROM to perform ADLs with increased ease. With   [x] TE   [] TA   [] neuro   [] other: Patient Education: [x] Review HEP    [] Progressed/Changed HEP based on:   [] positioning   [] body mechanics   [] transfers   [] heat/ice application    [x] other:      Other Objective/Functional Measures:   Pt cervical rotation to L increased following manual  Instructed patient on snags to add to HEP  Pt able to perform standing wand abduction with no increase in pain    Pain Level (0-10 scale) post treatment: 0/10    ASSESSMENT/Changes in Function:   Pt is progressing towards goals. Pt shoulder ROM and strength continues to improve but has some pain in the abduction/scaption plane with active movement. Pain in the neck has decreased with the decrease in resistance in HEP.     Patient will continue to benefit from skilled PT services to modify and progress therapeutic interventions, address functional mobility deficits, address ROM deficits, address strength deficits, analyze and address soft tissue restrictions, analyze and cue movement patterns, analyze and modify body mechanics/ergonomics and assess and modify postural abnormalities to attain remaining goals. Progress towards goals / Updated goals:  Short Term Goals: To be accomplished in 1 weeks:  1. Pt will be compliant with initial HEP to improve therapy outcomes Met (4/19/17)      Long Term Goals: To be accomplished in 6 weeks:  1. Pt will improve FOTO by 21 points in order to demonstrate functional improvement   2. Pt will improve L shoulder strength to 4+/5 in order to improve ease of yard work-met, left shoulder is grossly 4+/5 on 4/14/2017  3. Pt will improve L shoulder flexion/abduction AROM to >140 dgrs in order to improve ease of trimming bushes  Met: flexion: 140 abd: 145 degrees (4/12/17)  4. Pt will report < 3/10 average pain in order to improve QOL       Updated goal  5. Patient will improve L cervical rotation AROM to 55 degrees in order to increase ease of ADLs  Progressing: mobility improved to 59 degrees but painful at end range (4/12/17)    PLAN  []  Upgrade activities as tolerated     [x]  Continue plan of care  []  Update interventions per flow sheet       []  Discharge due to:_  []  Other:_      Tish Monique 4/21/2017  8:36 AM  I was present during the entire treatment, directing and participating in the treatment.    Jessica De La Rosa DPT      Future Appointments  Date Time Provider Patrica Grider   4/24/2017 8:30 AM Jessica De L aRosa PT [email or web address] [facility]   4/26/2017 8:30 AM Jessica De La Rosa PT [email or web address] [facility]   4/28/2017 8:30 AM Jessica De La Rosa PT [id number] [facility]   5/1/2017 8:30 AM Jessica De La Rosa PT [email or web address] [facility]   5/3/2017 8:30 AM Jessica De La Rosa PT [email or web address] [facility]   5/5/2017 8:30 AM Laura Marti Gianfranco Cain [facility]   5/8/2017 8:30 AM Arlette Mobley, PT [email or web address] [facility]   5/10/2017 8:30 AM Arlette Mobley PT [id number] [facility]   5/12/2017 8:30 AM Arlette Mobley PT [email or web address] [facility]   2/22/2018 7:45 AM IOC NURSE VISIT [facility] STEVE [facility]   3/1/2018 8:30 AM Zac Cardenas MD [facility]

## 2017-04-24 ENCOUNTER — HOSPITAL ENCOUNTER (OUTPATIENT)
Dept: PHYSICAL THERAPY | Age: 67
Discharge: HOME | End: 2017-04-24
Payer: MEDICARE

## 2017-04-24 PROCEDURE — 97110 THERAPEUTIC EXERCISES: CPT

## 2017-04-24 PROCEDURE — 97140 MANUAL THERAPY 1/> REGIONS: CPT

## 2017-04-24 PROCEDURE — G8979 MOBILITY GOAL STATUS: HCPCS

## 2017-04-24 PROCEDURE — G8978 MOBILITY CURRENT STATUS: HCPCS

## 2017-04-24 NOTE — PROGRESS NOTES
PT DAILY TREATMENT NOTE - [facility]     Patient Name: Emma Velasquez  Date:2017  : 1950  [x]  Patient  Verified  Payor: Sushma Culver / Plan: [place] MEDICARE PART A & B / Product Type: Medicare /    In time:8:30  Out time:9:10  Total Treatment Time (min): 40  Total Timed Codes (min): 40  1:1 Treatment Time ( [address] only): 40   Visit #: 10 of 18    Treatment Area: Pain in left shoulder [M25.512]    SUBJECTIVE  Pain Level (0-10 scale): 0/10  Any medication changes, allergies to medications, adverse drug reactions, diagnosis change, or new procedure performed?: [x] No    [] Yes (see summary sheet for update)  Subjective functional status/changes:   [] No changes reported  Pt states that his neck was hurting all weekend after attempting to go on a light bike ride. He felt a very sharp pain in the shoulder when hugging a relative over the weekend. OBJECTIVE      32 min Therapeutic Exercise:  [x] See flow sheet :   Rationale: increase ROM and increase strength to improve the patients ability to perform ADLs with increased ease. 8 min Manual Therapy:  SOR, trigger point release cervical paraspinals, L cervical PAIVMs    Rationale: decrease pain, increase ROM and increase tissue extensibility to turn head while driving with increased safety and ease. With   [x] TE   [] TA   [] neuro   [] other: Patient Education: [x] Review HEP    [] Progressed/Changed HEP based on:   [] positioning   [] body mechanics   [] transfers   [] heat/ice application    [x] other:      Other Objective/Functional Measures:   Cervical Rotation: R 60 deg, L 42 deg  Pain with Rhythmic stabs with horizontal add and flexion           Pain Level (0-10 scale) post treatment: 0/10    ASSESSMENT/Changes in Function:   Pt is progressing well towards goals but continue to have pain and limitations in ROM in the neck and shoulder. Cervical rotation to the L is limited to 42 deg with pain at end ranges making driving difficult. Patient will continue to benefit from skilled PT services to modify and progress therapeutic interventions, address functional mobility deficits, address ROM deficits, address strength deficits, analyze and address soft tissue restrictions, analyze and cue movement patterns, analyze and modify body mechanics/ergonomics and assess and modify postural abnormalities to attain remaining goals. Progress towards goals / Updated goals:  Short Term Goals: To be accomplished in 1 weeks:  1. Pt will be compliant with initial HEP to improve therapy outcomes Met (4/19/17)      Long Term Goals: To be accomplished in 6 weeks:  1. Pt will improve FOTO by 21 points in order to demonstrate functional improvement Not met: 45 to 65 points (4/24/17)  2. Pt will improve L shoulder strength to 4+/5 in order to improve ease of yard work-met, left shoulder is grossly 4+/5 on 4/14/2017  3. Pt will improve L shoulder flexion/abduction AROM to >140 dgrs in order to improve ease of trimming bushes  Met: flexion: 140 abd: 145 degrees (4/12/17)  4. Pt will report < 3/10 average pain in order to improve QOL       Updated goal  5.  Patient will improve L cervical rotation AROM to 55 degrees in order to increase ease of ADLs  Progressing: mobility improved to 42 degrees but painful at end range (4/24/17)     PLAN  []  Upgrade activities as tolerated     [x]  Continue plan of care  []  Update interventions per flow sheet       []  Discharge due to:_  []  Other:_      Anthony Lara 4/24/2017  8:34 AM    Future Appointments  Date Time Provider Patrica Grider   4/26/2017 8:30 AM Rizwan Machuca, PT [email or web address] [facility]   4/28/2017 8:30 AM Rizwan Sven, PT [email or web address] [facility]   5/1/2017 8:30 AM Rizwan Sven, PT [email or web address] [facility]   5/3/2017 8:30 AM Rizwan Sven, PT [email or web address] [facility]   5/5/2017 8:30 AM Rizwan Sven, PT [email or web address] [facility]   5/8/2017 8:30 AM Rizwan Sven, PT [email or web address] [facility]   5/10/2017 8:30 AM Rizwan Machuca PT [id number] [facility]   5/12/2017 8:30 AM Megan Snell PT [email or web address] [facility]   2/22/2018 7:45 AM IOC NURSE VISIT [facility]   3/1/2018 8:30 AM Gypsy Muir MD [facility]

## 2017-04-26 ENCOUNTER — HOSPITAL ENCOUNTER (OUTPATIENT)
Dept: PHYSICAL THERAPY | Age: 67
Discharge: HOME | End: 2017-04-26
Payer: MEDICARE

## 2017-04-26 PROCEDURE — 97110 THERAPEUTIC EXERCISES: CPT

## 2017-04-26 PROCEDURE — 97140 MANUAL THERAPY 1/> REGIONS: CPT

## 2017-04-28 ENCOUNTER — APPOINTMENT (OUTPATIENT)
Dept: PHYSICAL THERAPY | Age: 67
End: 2017-04-28
Payer: MEDICARE

## 2017-05-01 ENCOUNTER — APPOINTMENT (OUTPATIENT)
Dept: PHYSICAL THERAPY | Age: 67
End: 2017-05-01
Payer: MEDICARE

## 2017-05-03 ENCOUNTER — HOSPITAL ENCOUNTER (OUTPATIENT)
Dept: PHYSICAL THERAPY | Age: 67
Discharge: HOME | End: 2017-05-03
Payer: MEDICARE

## 2017-05-03 PROCEDURE — 97110 THERAPEUTIC EXERCISES: CPT

## 2017-05-03 PROCEDURE — 97140 MANUAL THERAPY 1/> REGIONS: CPT

## 2017-05-03 NOTE — PROGRESS NOTES
PT DAILY TREATMENT NOTE - [facility] 3-16    Patient Name: Nancy Sanders  Date:5/3/2017  : 1950  [x]  Patient  Verified  Payor: Raymond Culver / Plan: [place] MED[place] & B / Product Type: Medicare /    In time:8:00  Out time: 8:40  Total Treatment Time (min): 40  Total Timed Codes (min): 30  1:1 Treatment Time ( [address] only): 27  Visit #: 12 of 18    Treatment Area: Pain in left shoulder [M25.512]    SUBJECTIVE  Pain Level (0-10 scale): 0/10 shoulder, 1/10 neck  Any medication changes, allergies to medications, adverse drug reactions, diagnosis change, or new procedure performed?: [x] No    [] Yes (see summary sheet for update)  Subjective functional status/changes:   [] No changes reported  Pt states that he went to the doctor yesterday and they orders a MRI on Monday. He said that the doctor would like to have more deep tissue massage incorporated into therapy sessions. OBJECTIVE    Modality rationale: decrease pain and increase tissue extensibility to improve the patients ability to perform functional activities with increased ease.    Min Type Additional Details    [] Estim:  []Unatt       []IFC  []Premod                        []Other:  []w/ice   []w/heat  Position:  Location:    [] Estim: []Att    []TENS instruct  []NMES                    []Other:  []w/US   []w/ice   []w/heat  Position:  Location:    []  Traction: [] Cervical       []Lumbar                       [] Prone          []Supine                       []Intermittent   []Continuous Lbs:  [] before manual  [] after manual    []  Ultrasound: []Continuous   [] Pulsed                           []1MHz   []3MHz Location:  W/cm2:    []  Iontophoresis with dexamethasone         Location: [] Take home patch   [] In clinic   10 []  Ice     [x]  heat  []  Ice massage  []  Laser   []  Anodyne Position: seated  Location: L shoulder    []  Laser with stim  []  Other: Position:  Location:    []  Vasopneumatic Device Pressure:       [] lo [] med [] hi Temperature: [] lo [] med [] hi   [x] Skin assessment post-treatment:  [x]intact []redness- no adverse reaction    []redness - adverse reaction:       20 min Therapeutic Exercise:  [x] See flow sheet :   Rationale: increase ROM and increase strength to improve the patients ability to perform ADLs with increased ease. 10 min Manual Therapy:  SOR, STM/TPR cervical paraspinals   Rationale: decrease pain, increase ROM and increase tissue extensibility to perform functional activities with increased ease. With   [x] TE   [] TA   [] neuro   [] other: Patient Education: [x] Review HEP    [] Progressed/Changed HEP based on:   [] positioning   [] body mechanics   [] transfers   [] heat/ice application    [x] other:      Other Objective/Functional Measures:   Pt able to tolerate standing ABD with 1# weight, felt increased fatigue after 10 reps   Pt was able to get to letter S with ABD ABCs; felt a burn like the muscles were working   Pt is now using BTB for TB HEP     Pain Level (0-10 scale) post treatment:  0/10    ASSESSMENT/Changes in Function:   Pt is progressing well towards therapy goals. Strength and ROM are increasing and pt able to tolerate increase from AAROM to AROM with ABD exercises to allow pt to perform activities around the home with increased ease. Patient will continue to benefit from skilled PT services to modify and progress therapeutic interventions, address functional mobility deficits, address ROM deficits, address strength deficits, analyze and address soft tissue restrictions, analyze and cue movement patterns, analyze and modify body mechanics/ergonomics and assess and modify postural abnormalities to attain remaining goals. Progress towards goals / Updated goals:  Short Term Goals: To be accomplished in 1 weeks:  1. Pt will be compliant with initial HEP to improve therapy outcomes Met (4/19/17)      Long Term Goals: To be accomplished in 6 weeks:  1.  Pt will improve FOTO by 21 points in order to demonstrate functional improvement Not met: 45 to 65 points (4/24/17)  2. Pt will improve L shoulder strength to 4+/5 in order to improve ease of yard work-met, left shoulder is grossly 4+/5 on 4/14/2017  3. Pt will improve L shoulder flexion/abduction AROM to >140 dgrs in order to improve ease of trimming bushes  Met: flexion: 150 abd: 147 degrees (4/26/17)  4.  Pt will report < 3/10 average pain in order to improve QOL    PLAN  []  Upgrade activities as tolerated     [x]  Continue plan of care  []  Update interventions per flow sheet       []  Discharge due to:_  []  Other:_      Aleks Hunt 5/3/2017  8:34 AM

## 2017-05-05 ENCOUNTER — HOSPITAL ENCOUNTER (OUTPATIENT)
Dept: PHYSICAL THERAPY | Age: 67
Discharge: HOME | End: 2017-05-05
Payer: MEDICARE

## 2017-05-05 PROCEDURE — 97140 MANUAL THERAPY 1/> REGIONS: CPT

## 2017-05-05 PROCEDURE — 97110 THERAPEUTIC EXERCISES: CPT

## 2017-05-05 NOTE — PROGRESS NOTES
PT DAILY TREATMENT NOTE - [facility]     Patient Name: Sherron Hughes  Date:2017  : 1950  [x]  Patient  Verified  Payor: [place] MEDICARE / Plan: [place] MEDICARE PART A & B / Product Type: Medicare /    In time:8:24  Out time:9:06  Total Treatment Time (min): 42  Total Timed Codes (min): 42  1:1 Treatment Time ([facility] only): 42   Visit #: 13 of 18    Treatment Area: Pain in left shoulder [M25.512]    SUBJECTIVE  Pain Level (0-10 scale): 0/10 shoulder, 1/10 neck  Any medication changes, allergies to medications, adverse drug reactions, diagnosis change, or new procedure performed?: [x] No    [] Yes (see summary sheet for update)  Subjective functional status/changes:   [] No changes reported  Pt states that he is not in much pain today but he did not do anything any bike riding or anything else that would aggravate his neck. OBJECTIVE    30 min Therapeutic Exercise:  [x] See flow sheet :   Rationale: increase ROM and increase strength to improve the patients ability to perform ADLs with increased ease. 12 min Manual Therapy:  SOR, STM/TPR cervical paraspinals   Rationale: decrease pain, increase ROM and increase tissue extensibility to perform ADLs with increased ease. With   [x] TE   [] TA   [] neuro   [] other: Patient Education: [x] Review HEP    [] Progressed/Changed HEP based on:   [] positioning   [] body mechanics   [] transfers   [] heat/ice application    [x] other:      Other Objective/Functional Measures:   Pt able to tolerate ABD wall ABCs with no increase in pain  Increased standing flexion to 2#   Increased standing ABD to 10 reps   Attempted bodyblade. Pt had pain in wrist  Added OH ball toss on wall with no increase in pain. Pain Level (0-10 scale) post treatment:0/10    ASSESSMENT/Changes in Function:  Pt is progressing well towards therapy goals. Strength and ROM continue to improve evidenced by increase in resistance with AROM exercises.   Pt continues to have fluctuating pain in the neck and shoulder depending on position and amount of activities. Patient will continue to benefit from skilled PT services to modify and progress therapeutic interventions, address functional mobility deficits, address ROM deficits, address strength deficits, analyze and address soft tissue restrictions, analyze and cue movement patterns, analyze and modify body mechanics/ergonomics and assess and modify postural abnormalities to attain remaining goals. Progress towards goals / Updated goals:  Short Term Goals: To be accomplished in 1 weeks:  1. Pt will be compliant with initial HEP to improve therapy outcomes Met (17)      Long Term Goals: To be accomplished in 6 weeks:  1. Pt will improve FOTO by 21 points in order to demonstrate functional improvement Not met: 45 to 65 points (17)  2. Pt will improve L shoulder strength to 4+/5 in order to improve ease of yard work-met, left shoulder is grossly 4+/5 on 2017  3. Pt will improve L shoulder flexion/abduction AROM to >140 dgrs in order to improve ease of trimming bushes  Met: flexion: 150 abd: 147 degrees (17)  4. Pt will report < 3/10 average pain in order to improve QOL Progressin/10 in shoulder, 1/10 in neck today but had limited activities yesterday (17)    PLAN  []  Upgrade activities as tolerated     [x]  Continue plan of care  []  Update interventions per flow sheet       []  Discharge due to:_  []  Other:_      Kiera Mcnair 2017  8:22 AM  I was present during the entire treatment, directing and participating in the treatment.    Bryon Brown DPT      Future Appointments  Date Time Provider Patrica Grider   2017 8:30 AM Bryon Brown PT [id number] [facility]   2017 8:30 AM Bryon Brown PT [id number] [facility]   5/10/2017 8:30 AM Bryon Brown PT [id number] [facility]   2018 7:45 AM IO NURSE VISIT [facility] STEVE JAMES   3/1/2018 8:30 AM Meredith Cali MD [facility]

## 2017-05-08 ENCOUNTER — APPOINTMENT (OUTPATIENT)
Dept: PHYSICAL THERAPY | Age: 67
End: 2017-05-08
Payer: MEDICARE

## 2017-05-10 ENCOUNTER — HOSPITAL ENCOUNTER (OUTPATIENT)
Dept: PHYSICAL THERAPY | Age: 67
Discharge: HOME | End: 2017-05-10
Payer: MEDICARE

## 2017-05-10 PROCEDURE — 97140 MANUAL THERAPY 1/> REGIONS: CPT

## 2017-05-10 PROCEDURE — 97110 THERAPEUTIC EXERCISES: CPT

## 2017-05-10 NOTE — PROGRESS NOTES
PT DAILY TREATMENT NOTE - [facility] 3-16    Patient Name: Sherron Hughes  Date:5/10/2017  : 1950  [x]  Patient  Verified  Payor: [place] MEDICARE / Plan: VA MEDICARE PART A & B / Product Type: Medicare /    In time: 8:30  Out time: 9:14  Total Treatment Time (min): 44  Total Timed Codes (min): 34  1:1 Treatment Time ([facility] only): 30   Visit #: 14 of 18    Treatment Area: Pain in left shoulder [M25.512]    SUBJECTIVE  Pain Level (0-10 scale): 1/10  Any medication changes, allergies to medications, adverse drug reactions, diagnosis change, or new procedure performed?: [x] No    [] Yes (see summary sheet for update)  Subjective functional status/changes:   [] No changes reported  Pt. Reports his shoulder is sore from sleeping on his wrong. Pt. Reports he didn't have any neck pain with biking 25 miles.      OBJECTIVE    Modality rationale: decrease pain and increase tissue extensibility to improve the patients ability to increase ease of ADLs   Min Type Additional Details    [] Estim:  []Unatt       []IFC  []Premod                        []Other:  []w/ice   []w/heat  Position:  Location:    [] Estim: []Att    []TENS instruct  []NMES                    []Other:  []w/US   []w/ice   []w/heat  Position:  Location:    []  Traction: [] Cervical       []Lumbar                       [] Prone          []Supine                       []Intermittent   []Continuous Lbs:  [] before manual  [] after manual    []  Ultrasound: []Continuous   [] Pulsed                           []1MHz   []3MHz Location:  W/cm2:    []  Iontophoresis with dexamethasone         Location: [] Take home patch   [] In clinic   10 []  Ice     [x]  heat  []  Ice massage  []  Laser   []  Anodyne Position: seated  Location: L shoulder    []  Laser with stim  []  Other: Position:  Location:    []  Vasopneumatic Device Pressure:       [] lo [] med [] hi   Temperature: [] lo [] med [] hi   [x] Skin assessment post-treatment:  [x]intact []redness- no adverse reaction []redness - adverse reaction:     24 min Therapeutic Exercise:  [x] See flow sheet :   Rationale: increase ROM and increase strength to improve the patients ability to increase ease of ADLs    10 min Manual Therapy:  Trigger point release L cervical paraspinals, L AA MET, L upper cervical PAIVMs, cervical distraction. Rationale: decrease pain, increase ROM and increase tissue extensibility to increase ease of ADLs          With   [x] TE   [] TA   [] neuro   [] other: Patient Education: [x] Review HEP    [] Progressed/Changed HEP based on:   [] positioning   [] body mechanics   [] transfers   [] heat/ice application    [] other:      Other Objective/Functional Measures:   Cervical rotation AROM R: 60 degrees L:  70 degrees  Pt. Continues to have decreased AA mobility to left compared to right side. He was educated on R rotation head nods to add to HEP    Pain Level (0-10 scale) post treatment: 0/10    ASSESSMENT/Changes in Function:  Pt. Is progressing with physical therapy. He continues to have pain but demonstrates improving mobility. He continues to have significant tightness at L AA joint and cervical paraspinals. Patient will continue to benefit from skilled PT services to modify and progress therapeutic interventions, address functional mobility deficits, address ROM deficits, address strength deficits, analyze and address soft tissue restrictions and analyze and cue movement patterns to attain remaining goals. Progress towards goals / Updated goals:  1. Patient will improve FOTO score by 21 points in order to demonstrate a significant improvement in function. 2. Patient will improve L cervical rotation AROM to 55 degrees in order to increase ease of ADLs. Met: R cervical rotation improved to 60 degrees (5/10/17)  3.  Patient will report pain at 2/10 or less during ADLs in order to improve quality of life    PLAN  []  Upgrade activities as tolerated     [x]  Continue plan of care  []  Update interventions per flow sheet       []  Discharge due to:_  []  Other:_      Rizwan Machuca, PT 5/10/2017  7:56 AM

## 2017-05-12 ENCOUNTER — APPOINTMENT (OUTPATIENT)
Dept: PHYSICAL THERAPY | Age: 67
End: 2017-05-12
Payer: MEDICARE

## 2017-05-19 ENCOUNTER — HOSPITAL ENCOUNTER (OUTPATIENT)
Dept: PHYSICAL THERAPY | Age: 67
Discharge: HOME | End: 2017-05-19
Payer: MEDICARE

## 2017-05-19 PROCEDURE — G8979 MOBILITY GOAL STATUS: HCPCS

## 2017-05-19 PROCEDURE — 97110 THERAPEUTIC EXERCISES: CPT

## 2017-05-19 PROCEDURE — G8980 MOBILITY D/C STATUS: HCPCS

## 2017-05-19 NOTE — PROGRESS NOTES
PT DAILY TREATMENT NOTE - [facility] 3-16    Patient Name: Bethany Avila  Date:2017  : 1950  [x]  Patient  Verified  Payor: [place] MEDICARE / Plan: [place] MEDICARE PART A & B / Product Type: Medicare /    In time: 8:00  Out time: 8:30  Total Treatment Time (min): 30  Total Timed Codes (min): 30  1:1 Treatment Time ( [address] only): 30   Visit #: 15 of 18    Treatment Area: Pain in left shoulder [M25.512]    SUBJECTIVE  Pain Level (0-10 scale):  1/10  Any medication changes, allergies to medications, adverse drug reactions, diagnosis change, or new procedure performed?: [x] No    [] Yes (see summary sheet for update)  Subjective functional status/changes:   [] No changes reported  Pt. Reports he had pain with sneezing last night. He reports feeling better today. OBJECTIVE    30 min Therapeutic Exercise:  [x] See flow sheet :   Rationale: increase ROM and increase strength to improve the patients ability to increase ease of ADLs          With   [x] TE   [] TA   [] neuro   [] other: Patient Education: [x] Review HEP    [] Progressed/Changed HEP based on:   [] positioning   [] body mechanics   [] transfers   [] heat/ice application    [] other:      Other Objective/Functional Measures:   Pain continues to get severe at times but usually low  L shoulder AROM elevation: 145 degrees  Cervical rotation R: 72 L: 68 degrees  Pt. Continues to have occasional severe pain in neck and shoulder but then it decreases  Pt.  Was educated on arm across body stretch to help with behind back reaching following D/C  FOTO: 67 points    Pain Level (0-10 scale) post treatment: 0/10    ASSESSMENT/Changes in Function:      []  See Plan of Care  []  See progress note/recertification  [x]  See Discharge Summary         Progress towards goals / Updated goals:  See D/c note    PLAN  []  Upgrade activities as tolerated     []  Continue plan of care  []  Update interventions per flow sheet       [x]  Discharge due to:_ progress and patient going out of town  []  Other:_      Lennox Negro, PT 5/19/2017  8:03 AM
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
## 2017-08-16 DIAGNOSIS — E78.5 DYSLIPIDEMIA: ICD-10-CM

## 2017-08-16 RX ORDER — ATORVASTATIN CALCIUM 10 MG/1
10 TABLET, FILM COATED ORAL DAILY
Qty: 90 TAB | Refills: 3 | Status: SHIPPED | OUTPATIENT
Start: 2017-08-16 | End: 2018-08-30 | Stop reason: SDUPTHER

## 2017-08-16 NOTE — TELEPHONE ENCOUNTER
From: Vandana Meyer  To: Elbert Sharp MD  Sent: 8/16/2017 3:01 PM EDT  Subject: Medication Renewal Request    Original authorizing provider: MD Juwan John [place] would like a refill of the following medications:  atorvastatin (LIPITOR) 10 mg tablet Elbert Sharp MD]    Preferred pharmacy: [address] [place] [address] Ave:

## 2017-09-22 ENCOUNTER — HOSPITAL ENCOUNTER (OUTPATIENT)
Dept: PHYSICAL THERAPY | Age: 67
Discharge: HOME | End: 2017-09-22
Payer: MEDICARE

## 2017-09-22 PROCEDURE — G8981 BODY POS CURRENT STATUS: HCPCS

## 2017-09-22 PROCEDURE — G8982 BODY POS GOAL STATUS: HCPCS

## 2017-09-22 PROCEDURE — 97110 THERAPEUTIC EXERCISES: CPT

## 2017-09-22 PROCEDURE — 97162 PT EVAL MOD COMPLEX 30 MIN: CPT
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
## 2017-09-22 NOTE — PROGRESS NOTES
PT DAILY TREATMENT NOTE - [facility]     Patient Name: Alisa Dallas  Date:2017  : 1950  [x]  Patient  Verified  Payor: [place] MEDICARE / Plan: [place] MEDICARE PART A & B / Product Type: Medicare /    In time:8:38  Out time:9:11  Total Treatment Time (min): 33  Total Timed Codes (min): 10  1:1 Treatment Time ( [address] only): 33   Visit #: 1 of 20    Treatment Area: Clavicle pain [M89.8X1]    SUBJECTIVE  Pain Level (0-10 scale): 3  Any medication changes, allergies to medications, adverse drug reactions, diagnosis change, or new procedure performed?: [x] No    [] Yes (see summary sheet for update)  Subjective functional status/changes:   [] No changes reported  See POC    OBJECTIVE    23 min [x]Eval                  []Re-Eval     10 min Therapeutic Exercise:  [] See flow sheet : HEP instruction and demonstration, pt education regarding anatomy and physiology of the UEs and scapula/clavicle and how it relates to the pt's condition. Rationale: increase ROM and increase strength to improve the patients ability to tolerate ADLs          With   [] TE   [] TA   [] neuro   [] other: Patient Education: [x] Review HEP    [] Progressed/Changed HEP based on:   [] positioning   [] body mechanics   [] transfers   [] heat/ice application    [] other:      Other Objective/Functional Measures: See evaluation. Pain Level (0-10 scale) post treatment: 1    ASSESSMENT/Changes in Function: Pt given HEP handout to perform. Pt understood exercises in HEP handout. Pt demonstrated decreased AROM, decreased strength, tenderness to palpation, muscle tightness, impaired right scapular mechanics, right scapular winging, and decreased t/s mobility. Mild bruising noted on the right upper/mid UE and anterior/lateral ribs. Pt came into the clinic today without the sling on but states the MD wants him to wear the sling for 8 weeks. Educated pt to follow MD recommendations for sling use.  Pt would benefit from physical therapy to improve the above impairments to help the pt return to performing ADLs and functional activities. Patient will continue to benefit from skilled PT services to modify and progress therapeutic interventions, address functional mobility deficits, address ROM deficits, address strength deficits, analyze and address soft tissue restrictions, analyze and cue movement patterns, analyze and modify body mechanics/ergonomics, assess and modify postural abnormalities and instruct in home and community integration to attain remaining goals. [x]  See Plan of Care  []  See progress note/recertification  []  See Discharge Summary         Progress towards goals / Updated goals:  Short Term Goals: To be accomplished in 5 treatments:  1. Pt will report compliance and independence to HEP to help the pt manage their pain and symptoms. Eval: Established   2. Pt will report compliance with his sling per MD recommendations to improve tolerance to therapy interventions. Eval: was not wearing sling today during the evaluation and educated pt to wear the sling per MD recommendations. Long Term Goals: To be accomplished in 20 treatments:  1. Pt will increase FOTO score to 66 points to improve ability to perform ADLs. Eval: 53 points  2. Pt will report a decrease in at worst pain to 5/10 in the right clavicle/shoulder region to improve ability to brush his teeth with his right UE with less pain. Eval: 8-9/10  3. Pt will increase AROM right shoulder flex/ABD to 125 degs, ER to 50 degs (at 45 degs ABD), and IR to WNL to improve ability to tolerate reaching with the right UE. Eval: flex 103 degs,  degs, ER 38 degs (at 45 degs ABD), IR 65 degs (at 45 degs ABD), all with pain  4. Pt will report having mild to no difficulty with reaching into a shelf at shoulder height with the right UE to improve ability to perform household tasks.   Eval: reports having fear of reaching into a shelf at shoulder height but is supposed to be in sling for 8 weeks    PLAN  [x]  Upgrade activities as tolerated     [x]  Continue plan of care  [x]  Update interventions per flow sheet       []  Discharge due to:_  []  Other:_      Ashu Turk PT 9/22/2017  8:34 AM    Future Appointments  Date Time Provider Patrica Grider   9/22/2017 8:30 AM Ashu Turk PT [id number]   2/22/2018 7:45 AM IOC NURSE VISIT [facility] STEVE JACOB   3/1/2018 2:00 PM Liseth Torrez MD [facility]

## 2017-09-27 ENCOUNTER — HOSPITAL ENCOUNTER (OUTPATIENT)
Dept: PHYSICAL THERAPY | Age: 67
Discharge: HOME | End: 2017-09-27
Payer: MEDICARE

## 2017-09-27 PROCEDURE — 97140 MANUAL THERAPY 1/> REGIONS: CPT

## 2017-09-27 PROCEDURE — 97110 THERAPEUTIC EXERCISES: CPT

## 2017-09-27 NOTE — PROGRESS NOTES
PT DAILY TREATMENT NOTE - [facility]     Patient Name: Yris Nicole  Date:2017  : 1950  [x]  Patient  Verified  Payor: Ariel Aguirre / Plan: [place] MEDICARE PART A & B / Product Type: Medicare /    In time:900  Out time:944  Total Treatment Time (min): 44  Total Timed Codes (min): 34  1:1 Treatment Time ( only): 34   Visit #: 2 of 20    Treatment Area: Clavicle pain [M89.8X1]    SUBJECTIVE  Pain Level (0-10 scale): 1/10  Any medication changes, allergies to medications, adverse drug reactions, diagnosis change, or new procedure performed?: [x] No    [] Yes (see summary sheet for update)  Subjective functional status/changes:   [] No changes reported  Pt stated that he is doing pretty good, but still has a lot of rib pain    OBJECTIVE    Modality rationale: decrease inflammation and decrease pain to improve the patients ability to increase ease with ADLs   Min Type Additional Details    [] Estim:  []Unatt       []IFC  []Premod                        []Other:  []w/ice   []w/heat  Position:  Location:    [] Estim: []Att    []TENS instruct  []NMES                    []Other:  []w/US   []w/ice   []w/heat  Position:  Location:    []  Traction: [] Cervical       []Lumbar                       [] Prone          []Supine                       []Intermittent   []Continuous Lbs:  [] before manual  [] after manual    []  Ultrasound: []Continuous   [] Pulsed                           []1MHz   []3MHz W/cm2:  Location:    []  Iontophoresis with dexamethasone         Location: [] Take home patch   [] In clinic   10 [x]  Ice     []  heat  []  Ice massage  []  Laser   []  Anodyne Position:seated  Location:R sh    []  Laser with stim  []  Other:  Position:  Location:    []  Vasopneumatic Device Pressure:       [] lo [] med [] hi   Temperature: [] lo [] med [] hi   [x] Skin assessment post-treatment:  [x]intact []redness- no adverse reaction    []redness - adverse reaction:     26 min Therapeutic Exercise:  [x] See flow sheet :   Rationale: increase ROM and increase strength to improve the patients ability to increase ease with aDLS    8 min Manual Therapy:  PROM with gentle oscillations   Rationale: decrease pain, increase ROM and increase tissue extensibility to increase ease with ADLs    With   [x] TE   [] TA   [] neuro   [] other: Patient Education: [x] Review HEP    [] Progressed/Changed HEP based on:   [] positioning   [] body mechanics   [] transfers   [] heat/ice application    [] other:      Other Objective/Functional Measures:   Pt had no complaint of increased pain with exercises  Pt was very apprehensive at beginning of session, but relaxed by the end  Pt was a little guarded with PROM     Pain Level (0-10 scale) post treatment: 0/10    ASSESSMENT/Changes in Function:   Initiated therex today per flow sheet. Pt reported compliance with HEP. Pt put forth good effort with all exercises    Patient will continue to benefit from skilled PT services to address functional mobility deficits, address ROM deficits and address strength deficits to attain remaining goals. [x]  See Plan of Care  []  See progress note/recertification  []  See Discharge Summary         Progress towards goals / Updated goals:  Short Term Goals: To be accomplished in 5 treatments:  1. Pt will report compliance and independence to HEP to help the pt manage their pain and symptoms. 2. Pt will report compliance with his sling per MD recommendations to improve tolerance to therapy interventions. Long Term Goals: To be accomplished in 20 treatments:  1. Pt will increase FOTO score to 66 points to improve ability to perform ADLs. 2. Pt will report a decrease in at worst pain to 5/10 in the right clavicle/shoulder region to improve ability to brush his teeth with his right UE with less pain.   3. Pt will increase AROM right shoulder flex/ABD to 125 degs, ER to 50 degs (at 45 degs ABD), and IR to WNL to improve ability to tolerate reaching with the right UE. 4. Pt will report having mild to no difficulty with reaching into a shelf at shoulder height with the right UE to improve ability to perform household tasks.     PLAN  []  Upgrade activities as tolerated     [x]  Continue plan of care  []  Update interventions per flow sheet       []  Discharge due to:_  []  Other:_      Jose R Galicia PTA 9/27/2017  9:26 AM    Future Appointments  Date Time Provider Patrica Marni   9/29/2017 8:30 AM Vernon Godwin, PT [id number]   10/3/2017 9:00 AM Jose R Galicia PTA [facility]   10/5/2017 8:30 AM Vernon Godwin, PT [id number] [facility]   10/9/2017 8:00 AM Valerie Vasquez XORSIGM [facility]   10/11/2017 8:00 AM Vernon Godwin, PT [facility]   10/16/2017 8:30 AM Jose R Galicia PTA [id number]   10/18/2017 8:00 AM Jose R Galicia, KULWANT [facility]CPTPB [facility]   10/23/2017 8:00 AM Jose R Galicia PTA [facility]   10/25/2017 8:00 AM Vernon Godwin, PT [id number] [facility]   10/30/2017 8:00 AM Vernon Godwin, PT [id number] [facility]   11/1/2017 8:00 AM Jose R Galicia PTA [id number] [facility]   2/22/2018 7:45 AM IO NURSE VISIT [facility] STEVE SCHED   3/1/2018 2:00 PM Donnie Silveira MD [address]

## 2017-09-29 ENCOUNTER — HOSPITAL ENCOUNTER (OUTPATIENT)
Dept: PHYSICAL THERAPY | Age: 67
Discharge: HOME | End: 2017-09-29
Payer: MEDICARE

## 2017-09-29 PROCEDURE — 97140 MANUAL THERAPY 1/> REGIONS: CPT

## 2017-09-29 PROCEDURE — 97110 THERAPEUTIC EXERCISES: CPT

## 2017-09-29 NOTE — PROGRESS NOTES
PT DAILY TREATMENT NOTE - [facility] 3    Patient Name: Deni Bañuelos  Date:2017  : 1950  [x]  Patient  Verified  Payor: Elsi Saab / Plan: [place] MEDICARE PART A & B / Product Type: Medicare /    In time: 8:27  Out time: 9:58  Total Treatment Time (min): 31  Total Timed Codes (min): 31  1:1 Treatment Time ( [address] only): 31   Visit #: 3 of 20    Treatment Area: Clavicle pain [M89.8X1]    SUBJECTIVE  Pain Level (0-10 scale): 0/10  Any medication changes, allergies to medications, adverse drug reactions, diagnosis change, or new procedure performed?: [x] No    [] Yes (see summary sheet for update)  Subjective functional status/changes:   [] No changes reported  Pt. Reports he is doing pretty well but continues to have difficulty sleeping. OBJECTIVE    23 min Therapeutic Exercise:  [x] See flow sheet :   Rationale: increase ROM and increase strength to improve the patients ability to increase ease of ADLs    8 min Manual Therapy:  scap mobs, trigger point release infraspinatus and subscap   Rationale: decrease pain, increase ROM and increase tissue extensibility to increase ease of ADLs          With   [x] TE   [] TA   [] neuro   [] other: Patient Education: [x] Review HEP    [] Progressed/Changed HEP based on:   [] positioning   [] body mechanics   [] transfers   [] heat/ice application    [] other:      Other Objective/Functional Measures:   R shoulder PROM flex: 122 degrees ER: WNL   Pt. Had pain in back with scap squeezes  Pt. Has some soreness with table slides  No pain with finger ladder to level 26    Pain Level (0-10 scale) post treatment: 1/10    ASSESSMENT/Changes in Function:  Pt. Is progressing slowly towards goals. He demonstrates improving R shoulder PROM and is having less pain. Pt. Is non-compliant with sling use but reports wearing it if he is going to be more active.      Patient will continue to benefit from skilled PT services to modify and progress therapeutic interventions, address functional mobility deficits, address ROM deficits, address strength deficits, analyze and address soft tissue restrictions, analyze and cue movement patterns, analyze and modify body mechanics/ergonomics and assess and modify postural abnormalities to attain remaining goals. Progress towards goals / Updated goals:  Short Term Goals: To be accomplished in 5 treatments:  1. Pt will report compliance and independence to HEP to help the pt manage their pain and symptoms. met  2. Pt will report compliance with his sling per MD recommendations to improve tolerance to therapy interventions.                    Not met    Long Term Goals: To be accomplished in 20 treatments:  1. Pt will increase FOTO score to 66 points to improve ability to perform ADLs. 2. Pt will report a decrease in at worst pain to 5/10 in the right clavicle/shoulder region to improve ability to brush his teeth with his right UE with less pain. 3. Pt will increase AROM right shoulder flex/ABD to 125 degs, ER to 50 degs (at 45 degs ABD), and IR to [facility] improve ability to tolerate reaching with the right UE. 4. Pt will report having mild to no difficulty with reaching into a shelf at shoulder height with the right UE to improve ability to perform household tasks.     PLAN  []  Upgrade activities as tolerated     [x]  Continue plan of care  []  Update interventions per flow sheet       []  Discharge due to:_  []  Other:_      Radha Kessler PT 9/29/2017  8:27 AM

## 2017-10-03 ENCOUNTER — HOSPITAL ENCOUNTER (OUTPATIENT)
Dept: PHYSICAL THERAPY | Age: 67
Discharge: HOME | End: 2017-10-03
Payer: MEDICARE

## 2017-10-03 PROCEDURE — 97140 MANUAL THERAPY 1/> REGIONS: CPT

## 2017-10-03 PROCEDURE — 97110 THERAPEUTIC EXERCISES: CPT

## 2017-10-05 ENCOUNTER — HOSPITAL ENCOUNTER (OUTPATIENT)
Dept: PHYSICAL THERAPY | Age: 67
Discharge: HOME | End: 2017-10-05
Payer: MEDICARE

## 2017-10-05 PROCEDURE — 97110 THERAPEUTIC EXERCISES: CPT

## 2017-10-05 NOTE — PROGRESS NOTES
PT DAILY TREATMENT NOTE - [facility] 3-16    Patient Name: Steve Gambino  Date:10/5/2017  : 1950  [x]  Patient  Verified  Payor: VA MEDICARE / Plan: VA MEDICARE PART A & B / Product Type: Medicare /    In time: 8:29  Out time: 9:17  Total Treatment Time (min): 48  Total Timed Codes (min): 48  1:1 Treatment Time ( [address] only): 15   Visit #: 5 of 20    Treatment Area: Clavicle pain [M89.8X1]    SUBJECTIVE  Pain Level (0-10 scale): 0/10  Any medication changes, allergies to medications, adverse drug reactions, diagnosis change, or new procedure performed?: [x] No    [] Yes (see summary sheet for update)  Subjective functional status/changes:   [] No changes reported  Pt. Reports he has been doing pretty    OBJECTIVE    48 min Therapeutic Exercise:  [x] See flow sheet :   Rationale: increase ROM and increase strength to improve the patients ability to increase ease of ADLs          With   [x] TE   [] TA   [] neuro   [] other: Patient Education: [x] Review HEP    [] Progressed/Changed HEP based on:   [] positioning   [] body mechanics   [] transfers   [] heat/ice application    [] other:      Other Objective/Functional Measures:   Pt. Performed finger ladder flexion to level 28 today  Pt. Reports pain can get to 4-5/10 along collarbone  He demonstrated improving mobility during pulleys      Pain Level (0-10 scale) post treatment: 1/10    ASSESSMENT/Changes in Function: pt. Is progressing with physical therapy. He demonstrates improving R shoulder mobility and is having less pain. Pt. Requires cues to not perform exercises into pain and to improve his sling use. Patient will continue to benefit from skilled PT services to modify and progress therapeutic interventions, address functional mobility deficits, address ROM deficits, address strength deficits, analyze and address soft tissue restrictions, analyze and cue movement patterns and analyze and modify body mechanics/ergonomics to attain remaining goals. Progress towards goals / Updated goals:  Short Term Goals: To be accomplished in 5 treatments:  1. Pt will report compliance and independence to [facility] to help the pt manage their pain and symptoms. met  2. Pt will report compliance with his sling per MD recommendations to improve tolerance to therapy interventions.                    Not met      Long Term Goals: To be accomplished in 20 treatments:  1. Pt will increase FOTO score to 66 points to improve ability to perform ADLs. 2. Pt will report a decrease in at worst pain to 5/10 in the right clavicle/shoulder region to improve ability to brush his teeth with his right UE with less pain. Met: 4-5/10 (10/5/17)  3. Pt will increase AROM right shoulder flex/ABD to 125 degs, ER to 50 degs (at 45 degs ABD), and IR to [facility] improve ability to tolerate reaching with the right UE. 4. Pt will report having mild to no difficulty with reaching into a shelf at shoulder height with the right UE to improve ability to perform household tasks.     PLAN  []  Upgrade activities as tolerated     [x]  Continue plan of care  []  Update interventions per flow sheet       []  Discharge due to:_  []  Other:_      Milad Elam, PT 10/5/2017  8:31 AM

## 2017-10-09 ENCOUNTER — HOSPITAL ENCOUNTER (OUTPATIENT)
Dept: PHYSICAL THERAPY | Age: 67
Discharge: HOME | End: 2017-10-09
Payer: MEDICARE

## 2017-10-09 PROCEDURE — 97110 THERAPEUTIC EXERCISES: CPT

## 2017-10-09 NOTE — PROGRESS NOTES
PT DAILY TREATMENT NOTE - [facility] 316    Patient Name: Dereck Villasenor  Date:10/9/2017  : 1950  [x]  Patient  Verified  Payor: VA MEDICARE / Plan: VA MEDICARE PART A & B / Product Type: Medicare /    In time:8:05  Out time:8:40  Total Treatment Time (min): 35  Total Timed Codes (min): 25  1:1 Treatment Time ( [address] only): 16   Visit #: 6 of 20    Treatment Area: Clavicle pain [M89.8X1]    SUBJECTIVE  Pain Level (0-10 scale): 1-2/10  Any medication changes, allergies to medications, adverse drug reactions, diagnosis change, or new procedure performed?: [x] No    [] Yes (see summary sheet for update)  Subjective functional status/changes:   [] No changes reported  \"My left shoulder is hurting. \"    OBJECTIVE  Modality rationale: decrease pain to improve the patients ability to ease ADL tolerance   Min Type Additional Details    [] Estim:  []Unatt       []IFC  []Premod                        []Other:  []w/ice   []w/heat  Position:  Location:    [] Estim: []Att    []TENS instruct  []NMES                    []Other:  []w/US   []w/ice   []w/heat  Position:  Location:    []  Traction: [] Cervical       []Lumbar                       [] Prone          []Supine                       []Intermittent   []Continuous Lbs:  [] before manual  [] after manual    []  Ultrasound: []Continuous   [] Pulsed                           []1MHz   []3MHz Location:  W/cm2:    []  Iontophoresis with dexamethasone         Location: [] Take home patch   [] In clinic   10 []  Ice     [x]  heat  []  Ice massage  []  Laser   []  Anodyne Position: seated  Location: c/    []  Laser with stim  []  Other: Position:  Location:    []  Vasopneumatic Device Pressure:       [] lo [] med [] hi   Temperature: [] lo [] med [] hi   [x] Skin assessment post-treatment:  [x]intact []redness- no adverse reaction    []redness - adverse reaction:     25 min Therapeutic Exercise:  [x] See flow sheet :   Rationale: increase ROM, increase strength and improve coordination to improve the patients ability to increase ease with ADLs    With   [] TE   [] TA   [] neuro   [] other: Patient Education: [x] Review HEP    [] Progressed/Changed HEP based on:   [] positioning   [] body mechanics   [] transfers   [] heat/ice application    [] other:      Other Objective/Functional Measures:   Cues to perform all exercises within tolerable range--patient with poor compliance    Cues to avoid [place] hike     Pain Level (0-10 scale) post treatment: 0    ASSESSMENT/Changes in Function:   Patient continues to report noncompliance with sling use. Patient is concerned about left shoulder, states \"I didn't want to make a doctor's appointment because I go back in a week. \" Continue to focus on increasing ROM per patient tolerance. Patient will continue to benefit from skilled PT services to modify and progress therapeutic interventions, address functional mobility deficits, address ROM deficits, address strength deficits, analyze and address soft tissue restrictions, analyze and cue movement patterns, analyze and modify body mechanics/ergonomics and assess and modify postural abnormalities to attain remaining goals. []  See Plan of Care  []  See progress note/recertification  []  See Discharge Summary         Progress towards goals / Updated goals:  Short Term Goals: To be accomplished in 5 treatments:  1. Pt will report compliance and independence to HEP to help the pt manage their pain and symptoms. met  2. Pt will report compliance with his sling per MD recommendations to improve tolerance to therapy interventions.                    Not met      Long Term Goals: To be accomplished in 20 treatments:  1. Pt will increase FOTO score to 66 points to improve ability to perform ADLs. 2. Pt will report a decrease in at worst pain to 5/10 in the right clavicle/shoulder region to improve ability to brush his teeth with his right UE with less pain. Met: 4-5/10 (10/5/17)  3.  Pt will increase AROM right shoulder flex/ABD to 125 degs, ER to 50 degs (at 45 degs ABD), and IR to [facility] improve ability to tolerate reaching with the right UE. 4. Pt will report having mild to no difficulty with reaching into a shelf at shoulder height with the right UE to improve ability to perform household tasks.     PLAN  []  Upgrade activities as tolerated     [x]  Continue plan of care  []  Update interventions per flow sheet       []  Discharge due to:_  []  Other:_      Edinson Roman 10/9/2017  8:02 AM    Future Appointments  Date Time Provider Patrica Grider   10/11/2017 8:00 AM Thienphuc Jyoti Scale [id number] [facility]   10/16/2017 8:30 AM Mireille Arms, PTA [id number] [facility]   10/18/2017 8:00 AM Mireille Arms, PTA [id number] [facility]   10/23/2017 8:00 AM Mireille Arms, PTA [id number] [facility]   10/25/2017 8:00 AM Shiv Tong, PT [id number] [facility]   10/30/2017 8:00 AM Shiv Tong, PT [id number] [facility]   11/1/2017 8:00 AM Mireille Arms, PTA [id number] [facility]   2/22/2018 7:45 AM IOC NURSE VISIT [facility] STEVE JAMES   3/1/2018 2:00 PM Fabiola England MD [facility]

## 2017-10-11 ENCOUNTER — HOSPITAL ENCOUNTER (OUTPATIENT)
Dept: PHYSICAL THERAPY | Age: 67
Discharge: HOME | End: 2017-10-11
Payer: MEDICARE

## 2017-10-11 PROCEDURE — 97110 THERAPEUTIC EXERCISES: CPT

## 2017-10-11 PROCEDURE — G8981 BODY POS CURRENT STATUS: HCPCS

## 2017-10-11 PROCEDURE — G8982 BODY POS GOAL STATUS: HCPCS

## 2017-10-11 NOTE — PROGRESS NOTES
[facility] - Gilda No  [address] [facility]  [phone] fax    Medicare Progress Report    Patient name: Delores Timmons Start of Care: 2017   Referral source: Corey Arshad MD : 1950                         Medical Diagnosis: Clavicle pain [M89.8X1] Onset Date:2017                         Treatment Diagnosis: right clavicle pain/fracture   Prior Hospitalization: see medical history Provider#: [id number]   Medications: Verified on Patient summary List    Comorbidities: right RTC surgery , reports being seen in therapy for left shoulder pain before accident   Prior Level of Function: Reports having no pain in the right shoulder before accident. Visits from Start of Care: 7    Missed Visits: 0    Reporting Period: 2017 to 10-    Subjective Reports: \"My R shoulder is getting better, my L is going down hill down\"  Short Term Goals: To be accomplished in 5 treatments:                        1. Pt will report compliance and independence to [facility] to help the pt manage their pain and symptoms. met                        2. Pt will report compliance with his sling per MD recommendations to improve tolerance to therapy interventions. Not met, cont to be non-compliant 10-11-17      Long Term Goals: To be accomplished in 20 treatments:                        1. Pt will increase FOTO score to 66 points to improve ability to perform ADLs. Not met, no change compare to first assessment 10-11-17                        2. Pt will report a decrease in at worst pain to 5/10 in the right clavicle/shoulder region to improve ability to brush his teeth with his right UE with less pain. Met: 4-5/10 (10/5/17 )                        3. Pt will increase AROM right shoulder flex/ABD to 125 degs, ER to 50 degs (at 45 degs ABD), and IR to [facility] improve ability to tolerate reaching with the right UE.  Progressing, met with flex but mod-max limited with ER/IR 10-11-17                        4. Pt will report having mild to no difficulty with reaching into a shelf at shoulder height with the right UE to improve ability to perform household tasks. Progressing, able since having full functional AROM but mod-max pain 10-11-17    Key functional changes: improving AROM of B shoulder, improving pain level, improving functional mobility/performing ADLs      Problems/ barriers to goal attainment: pain with prolonged physical activities, difficulty with sleeping due to pain/uncomfortable position, decreased functional AROM of B shoulders     Assessment / Recommendations:Pt has been making good progress since [facility] with significant pain relief, improving AROM of B shoulders, and improving functional mobility. However, pt cont to be limited with significant pain at end range of B shoulders, L>R; difficulty with sleeping at night (partially due to neck pain) and non-compliance with sling use. Pt would cont to benefit from skilled PT to address these limitations for safe and comfortable ADLs performance. Problem List: pain affecting function, decrease ROM, decrease strength, edema affecting function, impaired gait/ balance, decrease ADL/ functional abilitiies, decrease activity tolerance, decrease flexibility/ joint mobility and decrease transfer abilities   Treatment Plan: Therapeutic exercise, Therapeutic activities, Neuromuscular re-education, Physical agent/modality, Gait/balance training, Manual therapy, Patient education, Self Care training, Functional mobility training and Home safety training    Patient Goal (s) has been updated and includes: \"moving with less pain\"     Updated Goals to be accomplished in 13 treatments:  [address] be accomplished in 13 treatments:   1. Pt will increase FOTO score to 66 points to improve ability to perform ADLs.    2. Pt will report compliance with his sling per MD recommendations to improve tolerance to therapy interventions. 3. Pt will report having mild to no difficulty with reaching into a shelf at shoulder height with the right UE to improve ability to perform household tasks. 4. Pt will increase AROM right shoulder flex/ABD to 125 degs, ER to 50 degs (at 45 degs ABD), and IR to [facility] improve ability to tolerate reaching with the right UE. Frequency / Duration: Patient to be seen 2-3 times per week for 13 treatments:    G-Codes (GP)  Position  [id number] Current  CK= 40-59%   Goal  CJ= 20-39%    The severity rating is based on clinical judgment and the FOTO score.       Salvador Montes, PT 10/11/2017 8:50 AM

## 2017-10-11 NOTE — PROGRESS NOTES
PT DAILY TREATMENT NOTE - [facility]     Patient Name: Celena January  Date:10/11/2017  : 1950  [x]  Patient  Verified  Payor: [place] MEDICARE / Plan: [place] MEDICARE PART A & B / Product Type: Medicare /    In time: 8:00  Out time: 8:46  Total Treatment Time (min): 46  Total Timed Codes (min): 36  1:1 Treatment Time ( only): 18   Visit #: 7 of 20    Treatment Area: Clavicle pain [M89.8X1]    SUBJECTIVE  Pain Level (0-10 scale): 0/10  Any medication changes, allergies to medications, adverse drug reactions, diagnosis change, or new procedure performed?: [x] No    [] Yes (see summary sheet for update)  Subjective functional status/changes:   [] No changes reported  Pt reported his R shoulder doing better gradually but L shoulder \"going down hill\"    OBJECTIVE  Modality rationale: decrease pain to improve the patients ability to ease ADL tolerance   Min Type Additional Details     [] Estim:  []Unatt       []IFC  []Premod                        []Other:  []w/ice   []w/heat  Position:  Location:     [] Estim: []Att    []TENS instruct  []NMES                    []Other:  []w/US   []w/ice   []w/heat  Position:  Location:     []  Traction: [] Cervical       []Lumbar                       [] Prone          []Supine                       []Intermittent   []Continuous Lbs:  [] before manual  [] after manual     []  Ultrasound: []Continuous   [] Pulsed                           []1MHz   []3MHz Location:  W/cm2:     []  Iontophoresis with dexamethasone         Location: [] Take home patch   [] In clinic   10 []  Ice     [x]  heat  []  Ice massage  []  Laser   []  Anodyne Position: seated  Location: R shoulder     []  Laser with stim  []  Other: Position:  Location:     []  Vasopneumatic Device Pressure:       [] lo [] med [] hi   Temperature: [] lo [] med [] hi   [x] Skin assessment post-treatment:  [x]intact []redness- no adverse reaction    []redness - adverse reaction:      36 min Therapeutic Exercise:  [x] See flow sheet :   Rationale: increase ROM, increase strength and improve coordination to improve the patients ability to increase ease with ADLs          With   [] TE   [] TA   [] neuro   [] other: Patient Education: [x] Review HEP    [] Progressed/Changed HEP based on:   [] positioning   [] body mechanics   [] transfers   [] heat/ice application    [] other:      Other Objective/Functional Measures:    B shoulders AROM (in sitting): flex: 130 degrees B, mod-max pain: IR: SI joint    Performed all therex appropriately with very min verbal cues    FOTO: 53    Pain Level (0-10 scale) post treatment: 1/10    ASSESSMENT/Changes in Function: see re-certification. Patient will continue to benefit from skilled PT services to modify and progress therapeutic interventions, address functional mobility deficits, address ROM deficits, address strength deficits, analyze and address soft tissue restrictions, analyze and cue movement patterns, analyze and modify body mechanics/ergonomics and assess and modify postural abnormalities to attain remaining goals.      []  See Plan of Care  [x]  See progress note/recertification  []  See Discharge Summary      Progress towards goals / Updated goals:  Short Term Goals: To be accomplished in 5 treatments:   1. Pt will report compliance and independence to HEP to help the pt manage their pain and symptoms. met   2. Pt will report compliance with his sling per MD recommendations to improve tolerance to therapy interventions. Not met, cont to be non-compliant 10-11-17      Long Term Goals: To be accomplished in 20 treatments:   1. Pt will increase FOTO score to 66 points to improve ability to perform ADLs. Not met, no change compare to first assessment 10-11-17   2. Pt will report a decrease in at worst pain to 5/10 in the right clavicle/shoulder region to improve ability to brush his teeth with his right UE with less pain. Met: 4-5/10 (10/5/17 )   3.  Pt will increase AROM right shoulder flex/ABD to 125 degs, ER to 50 degs (at 45 degs ABD), and IR to [facility] improve ability to tolerate reaching with the right UE. Progressing, met with flex but mod-max limited with ER/IR 10-11-17   4. Pt will report having mild to no difficulty with reaching into a shelf at shoulder height with the right UE to improve ability to perform household tasks.  Progressing, able since having full functional AROM but mod-max pain 10-11-17     PLAN  []  Upgrade activities as tolerated     [x]  Continue plan of care  []  Update interventions per flow sheet       []  Discharge due to:_  []  Other:_       Jake Park, PT 10/11/2017  7:47 AM    Future Appointments  Date Time Provider Patrica Grider   10/11/2017 8:00 AM Zhen Macias [facility]   10/16/2017 8:30 AM Claudell Dye, PTA [facility]   10/18/2017 8:00 AM Claudell Dye, PTA [facility]   10/23/2017 8:00 AM Claudell Dye, PTA [facility]   10/25/2017 8:00 AM Renella Burkitt, PT [facility]   10/30/2017 8:00 AM Claudell Dye, PTA [facility]   11/1/2017 8:00 AM Claudell Dye, PTA FCFPIIY [facility]   2/22/2018 7:45 AM [facility] NURSE VISIT [facility] STEVE SCHED   3/1/2018 2:00 PM Judie Magaña MD [facility]

## 2017-10-16 ENCOUNTER — HOSPITAL ENCOUNTER (OUTPATIENT)
Dept: PHYSICAL THERAPY | Age: 67
Discharge: HOME | End: 2017-10-16
Payer: MEDICARE

## 2017-10-16 PROCEDURE — 97110 THERAPEUTIC EXERCISES: CPT

## 2017-10-16 NOTE — PROGRESS NOTES
PT DAILY TREATMENT NOTE - [facility]     Patient Name: Darrius Perkins  Date:10/16/2017  : 1950  [x]  Patient  Verified  Payor: [place] MEDICARE / Plan: [place] MEDICARE PART A & B / Product Type: Medicare /    In time:830  Out time:858  Total Treatment Time (min): 28  Total Timed Codes (min): 28  1:1 Treatment Time ( only): 28   Visit #: 8 of 20    Treatment Area: Clavicle pain [M89.8X1]    SUBJECTIVE  Pain Level (0-10 scale): 0/10  Any medication changes, allergies to medications, adverse drug reactions, diagnosis change, or new procedure performed?: [x] No    [] Yes (see summary sheet for update)  Subjective functional status/changes:   [] No changes reported  Pt stated that he had some soreness, but no pain today    OBJECTIVE    28 min Therapeutic Exercise:  [x] See flow sheet :   Rationale: increase ROM and increase strength to improve the patients ability to increase ease with aDls    With   [x] TE   [] TA   [] neuro   [] other: Patient Education: [x] Review HEP    [] Progressed/Changed HEP based on:   [] positioning   [] body mechanics   [] transfers   [] heat/ice application    [] other:      Other Objective/Functional Measures:   Pt had no complaint of increased pain with exercises  Pt was pleased with an increase in weights for bicep curls  Good range of motion for flex, ER and scap using wand     Pain Level (0-10 scale) post treatment: 1/10    ASSESSMENT/Changes in Function:   Pt cont to progress well toward goals. Pt cont with some difficulty with reaching over head. AROM in shoulder is improving    Patient will continue to benefit from skilled PT services to modify and progress therapeutic interventions, address functional mobility deficits, address ROM deficits and address strength deficits to attain remaining goals.      []  See Plan of Care  [x]  See progress note/recertification  []  See Discharge Summary         Progress towards goals / Updated goals:  Long Term Goals: To be accomplished in 13 treatments:                        1. Pt will increase FOTO score to 66 points to improve ability to perform ADLs. 2. Pt will report compliance with his sling per MD recommendations to improve tolerance to therapy interventions. 3. Pt will report having mild to no difficulty with reaching into a shelf at shoulder height with the right UE to improve ability to perform household tasks.                         4. Pt will increase AROM right shoulder flex/ABD to 125 degs, ER to 50 degs (at 45 degs ABD), and IR to [facility] improve ability to tolerate reaching with the right UE.     PLAN  []  Upgrade activities as tolerated     [x]  Continue plan of care  []  Update interventions per flow sheet       []  Discharge due to:_  []  Other:_      Terrell Jones PTA 10/16/2017  8:34 AM    Future Appointments  Date Time Provider Patrica Grider   10/18/2017 8:00 AM Terrell Jones PTA [facility]   10/23/2017 8:00 AM Terrell Jones PTA [facility]   10/25/2017 8:00 AM Yue Booker, PT [id number] [facility]   10/30/2017 8:00 AM Terrell Jones PTA TGLWQAN [facility]   11/1/2017 8:00 AM Terrell Jones PTA YSOJEAF [facility]   2/22/2018 7:45 AM [facility] NURSE VISIT [facility] STEVE JAMES   3/1/2018 2:00 PM Briana Huang MD [address]

## 2017-10-18 ENCOUNTER — HOSPITAL ENCOUNTER (OUTPATIENT)
Dept: PHYSICAL THERAPY | Age: 67
Discharge: HOME | End: 2017-10-18
Payer: MEDICARE

## 2017-10-18 PROCEDURE — 97110 THERAPEUTIC EXERCISES: CPT

## 2017-10-18 NOTE — PROGRESS NOTES
PT DAILY TREATMENT NOTE - [facility]     Patient Name: Leighann Morgan  Date:10/18/2017  : 1950  [x]  Patient  Verified  Payor: [place] MEDICARE / Plan: [place] MEDICARE PART A & B / Product Type: Medicare /    In time:800  Out time:834  Total Treatment Time (min): 34  Total Timed Codes (min): 34  1:1 Treatment Time ( only): 8   Visit #: 9 of 20    Treatment Area: Clavicle pain [M89.8X1]    SUBJECTIVE  Pain Level (0-10 scale): 0/10  Any medication changes, allergies to medications, adverse drug reactions, diagnosis change, or new procedure performed?: [x] No    [] Yes (see summary sheet for update)  Subjective functional status/changes:   [] No changes reported  Pt stated that he is doing pretty good today    OBJECTIVE    34 min Therapeutic Exercise:  [x] See flow sheet :   Rationale: increase ROM and increase strength to improve the patients ability to increase ease with ADLs    With   [x] TE   [] TA   [] neuro   [] other: Patient Education: [x] Review HEP    [] Progressed/Changed HEP based on:   [] positioning   [] body mechanics   [] transfers   [] heat/ice application    [] other:      Other Objective/Functional Measures:   Pt had no complaint of increased pain during session  No difficulty with exercises     Pain Level (0-10 scale) post treatment: 1/10    ASSESSMENT/Changes in Function:   Pt cont to progress well toward goals. Pt cont with slight increase in pain with movement    Patient will continue to benefit from skilled PT services to modify and progress therapeutic interventions, address functional mobility deficits, address ROM deficits and address strength deficits to attain remaining goals. []  See Plan of Care  [x]  See progress note/recertification  []  See Discharge Summary         Progress towards goals / Updated goals:  Long Term Goals: To be accomplished in 13 treatments:                        5.  Pt will increase FOTO score to 66 points to improve ability to perform ADLs.                        6. Pt will report compliance with his sling per MD recommendations to improve tolerance to therapy interventions.                         3. Pt will report having mild to no difficulty with reaching into a shelf at shoulder height with the right UE to improve ability to perform household tasks.                        2. Pt will increase AROM right shoulder flex/ABD to 125 degs, ER to 50 degs (at 45 degs ABD), and IR to [facility] improve ability to tolerate reaching with the right UE.     PLAN  []  Upgrade activities as tolerated     [x]  Continue plan of care  []  Update interventions per flow sheet       []  Discharge due to:_  []  Other:_      Vinita Holly PTA 10/18/2017  8:23 AM    Future Appointments  Date Time Provider Patrica Grider   10/23/2017 8:00 AM Vinita Holly PTA [facility]   10/25/2017 8:00 AM Dayami Wang PT [facility]   10/30/2017 8:00 AM Vinita Holly PTA [facility]   11/1/2017 8:00 AM Vinita Holly PTA [facility]   2/22/2018 7:45 AM [facility] NURSE VISIT [facility] STEVE JAMES   3/1/2018 2:00 PM Allie Astorga MD [facility]

## 2017-10-23 ENCOUNTER — HOSPITAL ENCOUNTER (OUTPATIENT)
Dept: PHYSICAL THERAPY | Age: 67
Discharge: HOME | End: 2017-10-23
Payer: MEDICARE

## 2017-10-23 PROCEDURE — 97140 MANUAL THERAPY 1/> REGIONS: CPT | Performed by: PHYSICAL THERAPIST

## 2017-10-23 PROCEDURE — 97110 THERAPEUTIC EXERCISES: CPT | Performed by: PHYSICAL THERAPIST

## 2017-10-23 NOTE — PROGRESS NOTES
PT DAILY TREATMENT NOTE - [facility] 3-16    Patient Name: Harriett Watson  Date:10/23/2017  : 1950  [x]  Patient  Verified  Payor: [place] MEDICARE / Plan: [place] MEDICARE PART A & B / Product Type: Medicare /    In time:0800 Out [id number]  Total Treatment Time (min): 41  Total Timed Codes (min): 41  1:1 Treatment Time ( [address] only): 35   Visit #: 10 of 20    Treatment Area: Clavicle pain [M89.8X1]    SUBJECTIVE  Pain Level (0-10 scale): 0  Any medication changes, allergies to medications, adverse drug reactions, diagnosis change, or new procedure performed?: [x] No    [] Yes (see summary sheet for update)  Subjective functional status/changes:   [] No changes reported  Pt now 9 weeks post clavicle fx and rib fx , pt out of sling for 2 wks , pt reports highest pain up to 3/10 right side neck and top of right shoulder   Now able to reach to back seat in car , able to dress easily now , tie shows and bathing / personal grooming   Left shoulder also painful pt had an MRI will have a recheck  . Pt feels he is at least 75% better , has been avoiding overhead motion     OBJECTIVE    31 min Therapeutic Exercise:  [x] See flow sheet :   Rationale: increase ROM, increase strength and increase proprioception to improve the patients ability to reach overhead , perform ADLs and overall improve functional mobility       10 min Manual Therapy:   At cervical thoracic to aide shoulder positioning posture , and mobility    Rationale: increase ROM, increase tissue extensibility and increase postural awareness to improve shoulder and cervical functional mobility in upright posture           With   [] TE   [] TA   [] neuro   [] other: Patient Education: [x] Review HEP    [] Progressed/Changed HEP based on:   [] positioning   [] body mechanics   [] transfers   [] heat/ice application    [] other:      Other Objective/Functional Measures:  Manual tech for cervical MET for alignment and Mob with movt C7 T1 during cervical ROM   Taught towel self mob with movt during cervical rotation   Cervical ROM pre manual tech : Rot 58R, 40 L , SB 20R, 26 L , flexion 25 , ext 62  Post manual tech cervical ROM : Rot 70 R, 55 L   Taught cervical retract extend ex for improve mobility     Shoulder R AROM :  R, 155 L / Abd 122 R, 150 L  / at side ER 70 R, 75 L     Pain Level (0-10 scale) post treatment: 0     ASSESSMENT/Changes in Function: pt with cervical thoracic decrease mobility affecting right shoulder positioning and shoulder clavicle mobility , responded well to mobilisation and manual tech for cervical thoracic adding in positioning and mobility of right shoulder, pt reports having difficulty looking to left to clear traffic to rear while driving noted improved cervical rotation following session     Patient will continue to benefit from skilled PT services to modify and progress therapeutic interventions, address functional mobility deficits, address ROM deficits, address strength deficits, analyze and address soft tissue restrictions, analyze and cue movement patterns, analyze and modify body mechanics/ergonomics and assess and modify postural abnormalities to attain remaining goals. []  See Plan of Care  [x]  See progress note/recertification  []  See Discharge Summary         Progress towards goals / Updated goals:  Long Term Goals: To be accomplished in 13 treatments:                        2. Pt will increase FOTO score to 66 points to improve ability to perform ADLs. Current : 57/100 not met (10/23)                        2. Pt will report compliance with his sling per MD recommendations to improve tolerance to therapy interventions. Current: pt now out of sling 9 wks post injury  / MET (10/23)                        3. Pt will report having mild to no difficulty with reaching into a shelf at shoulder height with the right UE to improve ability to perform household tasks.   MET: able to reach up to shelf just above shoulder height and retreive something light (10/23)                         4. Pt will increase AROM right shoulder flex/ABD to 125 degs, ER to 50 degs (at 45 degs ABD), and IR to [facility] improve ability to tolerate reaching with the right UE.   Current : R AROM :  R, 155 L / Abd 122 R, 150 L  / at side ER 70 R, 75 L   Not met for ABD , progressing with all motions ( 10 /23)    PLAN  [x]  Upgrade activities as tolerated     [x]  Continue plan of care  []  Update interventions per flow sheet       []  Discharge due to:_  []  Other:_      Kristin Vences, PT 10/23/2017  8:01 AM

## 2017-10-25 ENCOUNTER — HOSPITAL ENCOUNTER (OUTPATIENT)
Dept: PHYSICAL THERAPY | Age: 67
Discharge: HOME | End: 2017-10-25
Payer: MEDICARE

## 2017-10-25 PROCEDURE — 97110 THERAPEUTIC EXERCISES: CPT

## 2017-10-25 NOTE — PROGRESS NOTES
PT DAILY TREATMENT NOTE - [facility] 3-16    Patient Name: Steve Gambino  Date:10/25/2017  : 1950  [x]  Patient  Verified  Payor: [place] MED[place]ARE / Plan: [place] MEDICARE PART A & B / Product Type: Medicare /    In time: 8:00  Out time: 8:37  Total Treatment Time (min): 37  Total Timed Codes (min): 37  1:1 Treatment Time ([facility] only): 25   Visit #: 11 of 20    Treatment Area: Clavicle pain [M89.8X1]    SUBJECTIVE  Pain Level (0-10 scale): 1/10  Any medication changes, allergies to medications, adverse drug reactions, diagnosis change, or new procedure performed?: [x] No    [] Yes (see summary sheet for update)  Subjective functional status/changes:   [] No changes reported  Pt. Reports he is feeling about the same today. OBJECTIVE    37 min Therapeutic Exercise:  [x] See flow sheet :   Rationale: increase ROM and increase strength to improve the patients ability to increase ease of ADLs          With   [x] TE   [] TA   [] neuro   [] other: Patient Education: [x] Review HEP    [] Progressed/Changed HEP based on:   [] positioning   [] body mechanics   [] transfers   [] heat/ice application    [] other:      Other Objective/Functional Measures:   Pt. Had L shoulder pain during stick abduction on R  He was challenged with side lying ER   Pt. Was challenged with abduction wall wipes    Pain Level (0-10 scale) post treatment: 1/10    ASSESSMENT/Changes in Function:  Pt. Is making slow progress towards goals. He continues to have pain at end ranges of motion and decreased R shoulder mobility. Patient will continue to benefit from skilled PT services to modify and progress therapeutic interventions, address functional mobility deficits, address ROM deficits, address strength deficits, analyze and address soft tissue restrictions, analyze and cue movement patterns, analyze and modify body mechanics/ergonomics and assess and modify postural abnormalities to attain remaining goals.      Progress towards goals / Updated goals:  Long Term Goals: To be accomplished in 13 treatments:                        2. Pt will increase FOTO score to 66 points to improve ability to perform ADLs. Current : 57/100 not met (10/23)                        2. Pt will report compliance with his sling per MD recommendations to improve tolerance to therapy interventions. Current: pt now out of sling 9 wks post injury  / MET (10/23)                        3. Pt will report having mild to no difficulty with reaching into a shelf at shoulder height with the right UE to improve ability to perform household tasks.   MET: able to reach up to shelf just above shoulder height and retreive something light (10/23)                         4. Pt will increase AROM right shoulder flex/ABD to 125 degs, ER to 50 degs (at 45 degs ABD), and IR to [facility] improve ability to tolerate reaching with the right UE.   Current : R AROM :  R, 155 L / Abd 122 R, 150 L  / at side ER 70 R, 75 L   Not met for ABD , progressing with all motions ( 10 /23)    PLAN  []  Upgrade activities as tolerated     [x]  Continue plan of care  []  Update interventions per flow sheet       []  Discharge due to:_  []  Other:_      Radha Kessler, PT 10/25/2017  8:02 AM

## 2017-10-30 ENCOUNTER — HOSPITAL ENCOUNTER (OUTPATIENT)
Dept: PHYSICAL THERAPY | Age: 67
Discharge: HOME | End: 2017-10-30
Payer: MEDICARE

## 2017-10-30 PROCEDURE — 97110 THERAPEUTIC EXERCISES: CPT

## 2017-10-30 NOTE — PROGRESS NOTES
PT DAILY TREATMENT NOTE - [facility]     Patient Name: Kelsi Leal  Date:10/30/2017  : 1950  [x]  Patient  Verified  Payor: [place] MEDICARE / Plan: [place] MEDICARE PART A & B / Product Type: Medicare /    In time:800  Out time:828  Total Treatment Time (min): 28  Total Timed Codes (min): 28  1:1 Treatment Time ( only): 28   Visit #: 12 of 20    Treatment Area: Clavicle pain [M89.8X1]    SUBJECTIVE  Pain Level (0-10 scale): 1/10  Any medication changes, allergies to medications, adverse drug reactions, diagnosis change, or new procedure performed?: [x] No    [] Yes (see summary sheet for update)  Subjective functional status/changes:   [] No changes reported  Pt stated that he rode his bike for the first time since the fall    OBJECTIVE    28 min Therapeutic Exercise:  [x] See flow sheet :   Rationale: increase ROM and increase strength to improve the patients ability to increase ease with ADLs    With   [x] TE   [] TA   [] neuro   [] other: Patient Education: [x] Review HEP    [] Progressed/Changed HEP based on:   [] positioning   [] body mechanics   [] transfers   [] heat/ice application    [] other:      Other Objective/Functional Measures:   Pt had increased pain with wall wipes  Cont to have difficulty with UT stretches  No difficulty with changes made today     Pain Level (0-10 scale) post treatment: 1/10    ASSESSMENT/Changes in Function:   Pt cont to progress well toward goals. R sh AROM cont to improve. Pt has minimal pain. Patient will continue to benefit from skilled PT services to modify and progress therapeutic interventions, address functional mobility deficits, address ROM deficits and address strength deficits to attain remaining goals. []  See Plan of Care  [x]  See progress note/recertification  []  See Discharge Summary         Progress towards goals / Updated goals:  Long Term Goals: To be accomplished in 13 treatments:                        0.  Pt will increase FOTO score to 66 points to improve ability to perform ADLs. met (10/23)                        6. Pt will report compliance with his sling per MD recommendations to improve tolerance to therapy interventions. MET (10/23)                        9. Pt will report having mild to no difficulty with reaching into a shelf at shoulder height with the right UE to improve ability to perform household tasks.   MET: able to reach up to shelf just above shoulder height and retreive something light (10/23)                         4. Pt will increase AROM right shoulder flex/ABD to 125 degs, ER to 50 degs (at 45 degs ABD), and IR to [facility] improve ability to tolerate reaching with the right UE.   Not met for ABD , progressing with all motions ( 10 /23)    PLAN  []  Upgrade activities as tolerated     [x]  Continue plan of care  []  Update interventions per flow sheet       []  Discharge due to:_  []  Other:_      Krishan Summers PTA 10/30/2017  8:04 AM    Future Appointments  Date Time Provider Patrica Grider   11/1/2017 8:00 AM Krishan Summers PTA [id number]   2/22/2018 7:45 AM IO NURSE VISIT [facility] STEVE SCHED   3/1/2018 2:00 PM Mario Lopez MD [facility]

## 2017-11-01 ENCOUNTER — APPOINTMENT (OUTPATIENT)
Dept: PHYSICAL THERAPY | Age: 67
End: 2017-11-01
Payer: MEDICARE

## 2017-11-02 ENCOUNTER — HOSPITAL ENCOUNTER (OUTPATIENT)
Dept: PHYSICAL THERAPY | Age: 67
Discharge: HOME | End: 2017-11-02
Payer: MEDICARE

## 2017-11-02 PROCEDURE — 97110 THERAPEUTIC EXERCISES: CPT

## 2017-11-02 NOTE — PROGRESS NOTES
PT DAILY TREATMENT NOTE - [facility]     Patient Name: Elise Macedo  Date:2017  : 1950  [x]  Patient  Verified  Payor: [place] MEDICARE / Plan: [place] MEDICARE PART A & B / Product Type: Medicare /    In time:10:00  Out time:10:30  Total Treatment Time (min):30  Total Timed Codes (min): 30  1:1 Treatment Time ( [address] only): 26  Visit #: 13 of 20    Treatment Area: Clavicle pain [M89.8X1]    SUBJECTIVE  Pain Level (0-10 scale): 1/10 neck  Any medication changes, allergies to medications, adverse drug reactions, diagnosis change, or new procedure performed?: [x] No    [] Yes (see summary sheet for update)  Subjective functional status/changes:   [] No changes reported  Pt reports that he had an MRI on his L shoulder 2 weeks ago . He follows up with his MD for the results on 11/15/17. Pt reports 90% improvement with his R shoulder. He has gotten a lot stronger but he still feels that his R shoulder is a little weak. Pt had to cx his appointment yesterday because of a family emergency. He has a cousin that went blind unexpectedly that he has been trying to help care for. He tried some bicep curls with 5# weights without pain but he only did 8 reps. He had an xray at his last MD visit that showed his clavicle fracture was completely healed.       OBJECTIVE      30 min Therapeutic Exercise:  [x] See flow sheet :   Rationale: increase ROM and increase strength to improve the patients ability to improve ease of reaching with ADLs            With   [] TE   [] TA   [] neuro   [] other: Patient Education: [x] Review HEP    [] Progressed/Changed HEP based on:   [] positioning   [] body mechanics   [] transfers   [] heat/ice application    [] other:      Other Objective/Functional Measures:     L shoulder pain with standing wand AAROM, increased pain subsided with rest     R Shoulder AROM  Flex 135 dgrs  Abd 142 dgrs  ER 70 dgrs     Discomfort with UT stretch, educated to perform stretches in pain-free range  Educated pt on use of SPC for DTM and TPR to address continued UT hypertonicity/trigger points     No increased pain with therapy     Pain Level (0-10 scale) post treatment: 1/10 neck     ASSESSMENT/Changes in Function:     Pt tolerated therapy without complaint of increased sx and is making steady progress towards final goals with therapy. He has met all goals in therapy and reports 90% improvement but feels that his R shoulder is still a little weak. Pt is following up with his MD on 11/15/17 regarding his L shoulder. Will establish final HEP next session with anticipated DC in 2 visits as pt has met therapy goals and can continue strength progression independently. Patient will continue to benefit from skilled PT services to modify and progress therapeutic interventions, address ROM deficits, address strength deficits, analyze and address soft tissue restrictions, analyze and cue movement patterns, assess and modify postural abnormalities and instruct in home and community integration to attain remaining goals. Progress towards goals / Updated goals:                        7. Pt will increase FOTO score to 66 points to improve ability to perform ADLs. met (10/23)                        2. Pt will report compliance with his sling per MD recommendations to improve tolerance to therapy interventions. MET (10/23)                        7. Pt will report having mild to no difficulty with reaching into a shelf at shoulder height with the right UE to improve ability to perform household tasks. MET: able to reach up to shelf just above shoulder height and retreive something light (10/23)                         4. Pt will increase AROM right shoulder flex/ABD to 125 degs, ER to 50 degs (at 45 degs ABD), and IR to [facility] improve ability to tolerate reaching with the right UE.   Goal met.   Flex 135 dgrs, Abd 142 dgrs, ER 70 dgrs 11/2/17    PLAN  []  Upgrade activities as tolerated     [x]  Continue plan of care  []  Update interventions per flow sheet       []  Discharge due to:_  []  Other:_      Merlene Cabrera, PT 11/2/2017  8:53 AM    Future Appointments  Date Time Provider Patrica Grider   11/2/2017 10:00 AM Merlene Cabrera, PT [id number]   2/22/2018 7:45 AM IOC NURSE VISIT [facility] STEVE JACOB   3/1/2018 2:00 PM Ruby Cobb MD [facility]

## 2017-11-07 ENCOUNTER — HOSPITAL ENCOUNTER (OUTPATIENT)
Dept: PHYSICAL THERAPY | Age: 67
Discharge: HOME | End: 2017-11-07
Payer: MEDICARE

## 2017-11-07 PROCEDURE — 97110 THERAPEUTIC EXERCISES: CPT

## 2017-11-07 NOTE — PROGRESS NOTES
PT DAILY TREATMENT NOTE - [facility]     Patient Name: Elise Macedo  Date:2017  : 1950  [x]  Patient  Verified  Payor: [place] MEDICARE / Plan: [place] MEDICARE PART A & B / Product Type: Medicare /    In time:830  Out time:905  Total Treatment Time (min): 35  Total Timed Codes (min): 35  1:1 Treatment Time ( only): 23   Visit #: 14 of 20    Treatment Area: Clavicle pain [M89.8X1]    SUBJECTIVE  Pain Level (0-10 scale): 0/10  Any medication changes, allergies to medications, adverse drug reactions, diagnosis change, or new procedure performed?: [x] No    [] Yes (see summary sheet for update)  Subjective functional status/changes:   [] No changes reported  Pt stated that he is doing well today    OBJECTIVE      35 min Therapeutic Exercise:  [x] See flow sheet :   Rationale: increase ROM and increase strength to improve the patients ability to increase ease with ADLs    With   [x] TE   [] TA   [] neuro   [] other: Patient Education: [x] Review HEP    [] Progressed/Changed HEP based on:   [] positioning   [] body mechanics   [] transfers   [] heat/ice application    [] other:      Other Objective/Functional Measures:   Final HEP was issued today   Pt showed understanding of all HEP    Pain Level (0-10 scale) post treatment: 1/10    ASSESSMENT/Changes in Function:   Pt cont to progress well with therapy and is to be discharged next visit    Patient will continue to benefit from skilled PT services to modify and progress therapeutic interventions, address functional mobility deficits, address ROM deficits and address strength deficits to attain remaining goals. []  See Plan of Care  [x]  See progress note/recertification  []  See Discharge Summary         Progress towards goals / Updated goals:    1. Pt will increase FOTO score to 66 points to improve ability to perform ADLs.    met (10/23)                        7. Pt will report compliance with his sling per MD recommendations to improve tolerance to therapy interventions.  MET (10/23)                        2. Pt will report having mild to no difficulty with reaching into a shelf at shoulder height with the right UE to improve ability to perform household tasks. MET: able to reach up to shelf just above shoulder height and retreive something light (10/23)                         4. Pt will increase AROM right shoulder flex/ABD to 125 degs, ER to 50 degs (at 45 degs ABD), and IR to [facility] improve ability to tolerate reaching with the right UE.   Goal met.   Flex 135 dgrs, Abd 142 dgrs, ER 70 dgrs 11/2/17    PLAN  []  Upgrade activities as tolerated     [x]  Continue plan of care  []  Update interventions per flow sheet       [x]  Discharge next visit  []  Other:_      Yuniel Thomas PTA 11/7/2017  8:30 AM    Future Appointments  Date Time Provider Patrica Grider   11/9/2017 8:30 AM Yuniel Thomas PTA [id number]   2/22/2018 7:45 AM IOC NURSE VISIT [facility]   3/1/2018 2:00 PM Emilie Montes MD [facility]

## 2017-11-09 ENCOUNTER — HOSPITAL ENCOUNTER (OUTPATIENT)
Dept: PHYSICAL THERAPY | Age: 67
Discharge: HOME | End: 2017-11-09
Payer: MEDICARE

## 2017-11-09 PROCEDURE — 97110 THERAPEUTIC EXERCISES: CPT

## 2017-11-09 PROCEDURE — G8982 BODY POS GOAL STATUS: HCPCS

## 2017-11-09 PROCEDURE — G8983 BODY POS D/C STATUS: HCPCS

## 2017-11-09 NOTE — PROGRESS NOTES
PT DAILY TREATMENT NOTE - [facility]     Patient Name: Bruno Soto  Date:2017  : 1950  [x]  Patient  Verified  Payor: [place] MEDICARE / Plan: [place] MEDICARE PART A & B / Product Type: Medicare /    In time:830  Out time:855  Total Treatment Time (min): 25  Total Timed Codes (min): 25  1:1 Treatment Time ([address] only): 25   Visit #: 15 of 20    Treatment Area: Clavicle pain [M89.8X1]    SUBJECTIVE  Pain Level (0-10 scale): 0/10  Any medication changes, allergies to medications, adverse drug reactions, diagnosis change, or new procedure performed?: [x] No    [] Yes (see summary sheet for update)  Subjective functional status/changes:   [] No changes reported  Pt stated that he is good today    OBJECTIVE    25 min Therapeutic Exercise:  [x] See flow sheet :   Rationale: increase ROM and increase strength to improve the patients ability to increase ease with ADLs    With   [x] TE   [] TA   [] neuro   [] other: Patient Education: [x] Review HEP    [] Progressed/Changed HEP based on:   [] positioning   [] body mechanics   [] transfers   [] heat/ice application    [] other:      Other Objective/Functional Measures:   Pt showed understanding of final HEP     Pain Level (0-10 scale) post treatment: 0/10    ASSESSMENT/Changes in Function:   []  See Plan of Care  []  See progress note/recertification  [x]  See Discharge Summary         Progress towards goals / Updated goals:   1. Pt will increase FOTO score to 66 points to improve ability to perform ADLs.  met (10/23)                        6. Pt will report compliance with his sling per MD recommendations to improve tolerance to therapy interventions.  MET (10/23)                        7. Pt will report having mild to no difficulty with reaching into a shelf at shoulder height with the right UE to improve ability to perform household tasks.   MET: able to reach up to shelf just above shoulder height and retreive something light (10/23)                         4. Pt will increase AROM right shoulder flex/ABD to 125 degs, ER to 50 degs (at 45 degs ABD), and IR to [facility] improve ability to tolerate reaching with the right UE.   Goal met.  Flex 135 dgrs, Abd 142 dgrs, ER 70 dgrs 11/2/17    PLAN  []  Upgrade activities as tolerated     []  Continue plan of care  []  Update interventions per flow sheet       [x]  Discharge  []  Other:_      Jones Brewster PTA 11/9/2017  8:18 AM    Future Appointments  Date Time Provider Patrica Grider   11/9/2017 8:30 AM Jones Brewster PTA [id number]   2/22/2018 7:45 AM IOC NURSE VISIT [facility] STEVE JAMES   3/1/2018 2:00 PM Khadar Gonzalez MD [facility]
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
## 2017-12-29 ENCOUNTER — OFFICE VISIT (OUTPATIENT)
Dept: INTERNAL MEDICINE CLINIC | Age: 67
End: 2017-12-29

## 2017-12-29 VITALS
DIASTOLIC BLOOD PRESSURE: 66 MMHG | TEMPERATURE: 98.8 F | BODY MASS INDEX: 24.48 KG/M2 | WEIGHT: 156 LBS | HEIGHT: 67 IN | HEART RATE: 73 BPM | SYSTOLIC BLOOD PRESSURE: 126 MMHG | OXYGEN SATURATION: 97 % | RESPIRATION RATE: 16 BRPM

## 2017-12-29 DIAGNOSIS — Z01.818 PREOP EXAM FOR INTERNAL MEDICINE: ICD-10-CM

## 2017-12-29 DIAGNOSIS — N13.8 BPH WITH OBSTRUCTION/LOWER URINARY TRACT SYMPTOMS: ICD-10-CM

## 2017-12-29 DIAGNOSIS — N40.1 BPH WITH OBSTRUCTION/LOWER URINARY TRACT SYMPTOMS: ICD-10-CM

## 2017-12-29 DIAGNOSIS — K63.5 HYPERPLASTIC COLONIC POLYP, UNSPECIFIED PART OF COLON: ICD-10-CM

## 2017-12-29 DIAGNOSIS — E78.5 DYSLIPIDEMIA: Primary | ICD-10-CM

## 2017-12-29 RX ORDER — BISMUTH SUBSALICYLATE 262 MG
1 TABLET,CHEWABLE ORAL DAILY
COMMUNITY

## 2017-12-29 NOTE — PROGRESS NOTES
Chief Complaint   Patient presents with    Surgical Clearance     Left shoulder surgical clearance for signficant tear in rotator cuff. Surgery on 1/11/2018 Dr. Lyudmila Mcelroy at the [facility]. Patient brought in his form stating that he took his influenza vaccine in October 3, 2017 from [address] Patient states she took some generic Musinex for a scratchy throat. Onset 4 days. 1. Have you been to the ER, urgent care clinic or hospitalized since your last visit? NO.     2. Have you seen or consulted any other health care providers outside of the [address] [place] [address] since your last visit (Include any pap smears or colon screening)? NO      Do you have an Advanced Directive? NO    Would you like information on Advanced Directives? NO, Patient states he has the paperwork at home.

## 2018-01-02 ENCOUNTER — OFFICE VISIT (OUTPATIENT)
Dept: INTERNAL MEDICINE CLINIC | Age: 68
End: 2018-01-02

## 2018-01-02 VITALS
HEART RATE: 68 BPM | HEIGHT: 67 IN | WEIGHT: 156 LBS | RESPIRATION RATE: 14 BRPM | TEMPERATURE: 97.9 F | DIASTOLIC BLOOD PRESSURE: 74 MMHG | SYSTOLIC BLOOD PRESSURE: 116 MMHG | OXYGEN SATURATION: 98 % | BODY MASS INDEX: 24.48 KG/M2

## 2018-01-02 DIAGNOSIS — J40 BRONCHITIS: Primary | ICD-10-CM

## 2018-01-02 RX ORDER — CODEINE PHOSPHATE AND GUAIFENESIN 10; 100 MG/5ML; MG/5ML
5 SOLUTION ORAL
Qty: 120 ML | Refills: 0 | Status: SHIPPED | OUTPATIENT
Start: 2018-01-02 | End: 2018-05-28 | Stop reason: SDUPTHER

## 2018-01-02 RX ORDER — AZITHROMYCIN 250 MG/1
TABLET, FILM COATED ORAL
Qty: 6 TAB | Refills: 0 | Status: SHIPPED | OUTPATIENT
Start: 2018-01-02 | End: 2019-03-11

## 2018-01-02 NOTE — MR AVS SNAPSHOT
Visit Information Date & Time Provider Department Dept. Phone Encounter #  
 1/2/2018 11:40 AM Eulalia Felder MD [facility] [address] [place] [address] [id number] Your Appointments 2/22/2018  7:45 AM  
LAB with [facility] NURSE VISIT [facility] [address] [place] [address] ([facility]) Appt Note: labs 1yr rd  
 [address], [address] [place]  
  
   
 [address], [phone] [phone]  
  
    
 3/1/2018  2:00 PM  
PHYSICAL with Eulalia Felder MD  
[facility] [address] [place] [address] [facility]) Appt Note: rpe  
 [address], [address] [place] [place]  
  
   
 [address], [address] Upcoming Health Maintenance Date Due  
 MEDICARE YEARLY EXAM 2/24/2018 COLONOSCOPY 8/29/2018 GLAUCOMA SCREENING Q2Y 2/18/2019 DTaP/Tdap/Td series (2 - Td) 2/19/2027 Allergies as of 1/2/2018  Review Complete On: 1/2/2018 By: Meena Simmons Severity Noted Reaction Type Reactions Flomax [Tamsulosin]  07/25/2011    Vertigo Uroxatral [Alfuzosin]  02/29/2012    Vertigo And fainting Current Immunizations  Reviewed on 12/29/2017 Name Date Influenza High Dose Vaccine PF 10/3/2017, 11/14/2016, 10/15/2015 Influenza Vaccine 11/10/2014 Pneumococcal Conjugate (PCV-13) 2/11/2016 Pneumococcal Polysaccharide (PPSV-23) 2/5/2015 Td 1/1/2010 Tdap 2/19/2017 10:50 AM  
 Zoster Vaccine, Live 1/1/2010 Not reviewed this visit You Were Diagnosed With   
  
 Codes Comments Bronchitis    -  Primary ICD-10-CM: Z37 ICD-9-CM: 660 Vitals BP Pulse Temp Resp Height(growth percentile) Weight(growth percentile) 116/74 (BP 1 Location: Left arm, BP Patient Position: Sitting) 68 97.9 °F (36.6 °C) (Oral) 14 5' 7\" (1.702 m) 156 lb (70.8 kg) SpO2 BMI Smoking Status 98% 24.43 kg/m2 Never Smoker Vitals History BMI and BSA Data Body Mass Index Body Surface Area  
 24.43 kg/m 2 1.83 m 2 Preferred Pharmacy Pharmacy Name Phone [address], [address] [place] [address] [phone] Your Updated Medication List  
  
   
This list is accurate as of: 1/2/18 12:25 PM.  Always use your most recent med list.  
  
  
  
  
 atorvastatin 10 mg tablet Commonly known as:  LIPITOR Take 1 Tab by mouth daily. azithromycin 250 mg tablet Commonly known as:  Rachana Bowman Take 2 tablets today, then take 1 tablet daily  
  
 gabapentin 600 mg tablet Commonly known as:  NEURONTIN  
take 1 tablet by mouth three times a day  
  
 guaiFENesin-codeine 100-10 mg/5 mL solution Commonly known as:  ROBITUSSIN AC Take 5 mL by mouth three (3) times daily as needed for Cough. Max Daily Amount: 15 mL.  
  
 multivitamin tablet Commonly known as:  ONE A DAY Take 1 Tab by mouth daily. oxyCODONE-acetaminophen 7.5-325 mg per tablet Commonly known as:  PERCOCET 7.5 Take 1 Tab by mouth every six (6) hours as needed for Pain. Max Daily Amount: 4 Tabs. Prescriptions Printed Refills  
 azithromycin (ZITHROMAX) 250 mg tablet 0 Sig: Take 2 tablets today, then take 1 tablet daily Class: Print  
 guaiFENesin-codeine (ROBITUSSIN AC) 100-10 mg/5 mL solution 0 Sig: Take 5 mL by mouth three (3) times daily as needed for Cough. Max Daily Amount: 15 mL. Class: Print Route: Oral  
  
Introducing [facility]! Dear Des Branu: Thank you for requesting a [employer] account. Our records indicate that you already have an active [employer] account. You can access your account anytime at [email or web address] Did you know that you can access your hospital and ER discharge instructions at any time in [employer]? You can also review all of your test results from your hospital stay or ER visit. Additional Information If you have questions, please visit the Frequently Asked Questions section of the [email or web address] website at [email or web address] Remember, [employer] is NOT to be used for urgent needs. For medical emergencies, dial 911. Now available from your iPhone and Android! Please provide this summary of care documentation to your next provider. Your primary care clinician is listed as Enmanuel Singh. If you have any questions after today's visit, please call [phone].

## 2018-01-02 NOTE — PROGRESS NOTES
75 y.o. WHITE OR  male who presents for evaluation. He started having uri sx about a week ago. We actually saw him 12/29/17 but he felt the sx were clearing. However, things got worse over the weekend and he started having discolored drainage which over the last day has gotten to be dark green sputum. No measured f, mild L>R ear fullness although no actual pain, some facial pressure without tooth pain. No wheezing, sob, gi complaints. Been using Delsym which helps the cough but unable to sleep for very long due to the cough. He has surgery scheduled for 1/11/18    . elizabeth  Current Outpatient Prescriptions   Medication Sig    azithromycin (ZITHROMAX) 250 mg tablet Take 2 tablets today, then take 1 tablet daily    guaiFENesin-codeine (ROBITUSSIN AC) 100-10 mg/5 mL solution Take 5 mL by mouth three (3) times daily as needed for Cough. Max Daily Amount: 15 mL.  multivitamin (ONE A DAY) tablet Take 1 Tab by mouth daily.  atorvastatin (LIPITOR) 10 mg tablet Take 1 Tab by mouth daily.  oxyCODONE-acetaminophen (PERCOCET 7.5) 7.5-325 mg per tablet Take 1 Tab by mouth every six (6) hours as needed for Pain. Max Daily Amount: 4 Tabs.  gabapentin (NEURONTIN) 600 mg tablet take 1 tablet by mouth three times a day     No current facility-administered medications for this visit. Allergies   Allergen Reactions    Flomax [Tamsulosin] Vertigo    Uroxatral [Alfuzosin] Vertigo     And fainting     Visit Vitals    /74 (BP 1 Location: Left arm, BP Patient Position: Sitting)    Pulse 68    Temp 97.9 °F (36.6 °C) (Oral)    Resp 14    Ht 5' 7\" (1.702 m)    Wt 156 lb (70.8 kg)    SpO2 98%    BMI 24.43 kg/m2   tm wnl, sinuses nt but w boggy mucosa, op benign, no nodes. Lungs cta, heart showed rrr, abd soft and nt    Assessment and plan:  1. Bronchitis. Zpak and robit ac given, call if no improvement        Above conditions discussed at length and patient vocalized understanding.   All questions answered to patient satisfaction

## 2018-01-02 NOTE — PROGRESS NOTES
1. Have you been to the ER, urgent care clinic or hospitalized since your last visit? NO.     2. Have you seen or consulted any other health care providers outside of the [address] [place] [address] since your last visit (Include any pap smears or colon screening)? NO      Do you have an Advanced Directive? NO    Would you like information on Advanced Directives?  NO

## 2018-01-26 ENCOUNTER — HOSPITAL ENCOUNTER (OUTPATIENT)
Dept: PHYSICAL THERAPY | Age: 68
Discharge: HOME | End: 2018-01-26
Payer: MEDICARE

## 2018-01-26 PROCEDURE — G8985 CARRY GOAL STATUS: HCPCS

## 2018-01-26 PROCEDURE — 97161 PT EVAL LOW COMPLEX 20 MIN: CPT

## 2018-01-26 PROCEDURE — G8984 CARRY CURRENT STATUS: HCPCS

## 2018-01-26 PROCEDURE — 97110 THERAPEUTIC EXERCISES: CPT

## 2018-01-26 NOTE — PROGRESS NOTES
PT DAILY TREATMENT NOTE/SHOULDER EVAL 3-16    Patient Name: Linda Jaquez  Date:2018  : 1950  [x]  Patient  Verified  Payor: Anton Payor / Plan: [place] MED[place]ARE PART A & B / Product Type: Medicare /    In time: 2:00  Out time: 2:30  Total Treatment Time (min): 8  Total Timed Codes (min): 30  1:1 Treatment Time ( [address] only): 30   Visit #: 1 of 12    Treatment Area: There are no admission diagnoses documented for this encounter. SUBJECTIVE  Pain Level (0-10 scale):  5/10  []constant []intermittent []improving []worsening []no change since onset    Any medication changes, allergies to medications, adverse drug reactions, diagnosis change, or new procedure performed?: [x] No    [] Yes (see summary sheet for update)  Subjective functional status/changes:     PLOF: bike riding  Mechanism of Injury:  18. Reports has been using sling and not using his arm. Denies numbness/tingling down arm. Is back to sleeping in his bed. Ind with donning/doffing sling and shirts. Work Hx: retired  Pt Goals:   To get arm back to normal  Cognition: A & O x 4    Other:    OBJECTIVE/EXAMINATION    8 min Therapeutic Exercise:  [] See flow sheet : HEP   Rationale: increase ROM and increase strength to improve the patients ability to increase ease of ADLs    With   [x] TE   [] TA   [] neuro   [] other: Patient Education: [x] Review HEP    [] Progressed/Changed HEP based on:   [] positioning   [] body mechanics   [] transfers   [] heat/ice application    [] other:      Physical Therapy Evaluation - Shoulder    Posture: [] Poor    [x] Fair    [] Good    Describe: fwd shoulders    ROM:  [] Unable to assess at this time                                           AROM                                                              PROM   Left Right  Left Right   Flexion  135 Flexion 95    Extension   Extension     Scaption/ABD  110 Scaptin/ABD 90    ER @ 0 Degrees   ER @ 0 Degrees     ER @ 90 Degrees   ER @ 90 Degrees     IR @ 90 Degrees   IR @ 90 Degrees       End Feel / Painful Arc:    Strength:   [] Unable to assess at this time                                                                            L (1-5) R (1-5) Pain   Flexors  4 [] Yes   [] No   Abductors  4 [] Yes   [] No   External Rotators  4 [] Yes   [] No   Internal Rotators  4 [] Yes   [] No   Supraspinatus   [] Yes   [] No   Serratus Anterior   [] Yes   [] No   Lower Trapezius   [] Yes   [] No   Elbow Flexion   [] Yes   [] No   Elbow Extension   [] Yes   [] No     Palpation  [] Min  [x] Mod  [] Severe    Location: anterior shoulder   [] Min  [] Mod  [] Severe    Location:  [] Min  [] Mod  [] Severe    Location:    Optional Tests:    Scapula hypomobility    Other Tests / Comments:   Incisions appear to be healing well with no significant redness or drainage noted        Pain Level (0-10 scale) post treatment:  5/10    ASSESSMENT/Changes in Function:      [x]  See Plan of Care  []  See progress note/recertification  []  See Discharge Summary         Progress towards goals / Updated goals:  See POC    PLAN  []  Upgrade activities as tolerated     [x]  Continue plan of care  []  Update interventions per flow sheet       []  Discharge due to:_  []  Other:_      Jose Lawler, PT 1/26/2018  2:05 PM

## 2018-01-26 NOTE — PROGRESS NOTES
[facility]  [address] [place] [address]  [phone] fax    Plan of Care/ Statement of Necessity for Physical Therapy Services    Patient name: Lauren Calles Start of Care: 2018   Referral source: Jesus Cazares MD : 1950    Medical Diagnosis: There are no admission diagnoses documented for this encounter. Onset Date: 18   Treatment Diagnosis: L shoulder pain   Prior Hospitalization: see medical history Provider#: [id number]   Medications: Verified on Patient summary List    Comorbidities: arthritis, history or R rotator cuff surgery in 2014 and clavicle fracture in 2017   Prior Level of Function:  Ind with ADLs, driving, bike riding       The Plan of Care and following information is based on the information from the initial evaluation. Assessment/ key information: pt. Is a 75year old male s/p L rotator cuff repair on 18. He reports he has been using his sling and is not actively using his arm. He denies numbness/tingling down L arm. He is sleeping in his bed again and Ind with donning/doffing sling. He presents with decreased L shoulder PROM flex: 95 degrees scap: 90 degrees with empty end feels. He also has decreased scapula mobility. Incisions appear to be healing well with no excessive redness or drainage noted. He demonstrates good understanding of his HEP. Skilled PT is medically necessary in order to improve L shoulder mobility and strength for increase ease of ADLs and return to PLOF.      Evaluation Complexity History MEDIUM  Complexity : 1-2 comorbidities / personal factors will impact the outcome/ POC ; Examination MEDIUM Complexity : 3 Standardized tests and measures addressing body structure, function, activity limitation and / or participation in recreation  ;Presentation LOW Complexity : Stable, uncomplicated  ;Clinical Decision Making MEDIUM Complexity : FOTO score of 26-74  Overall Complexity Rating: LOW   Problem List: pain affecting function, decrease ROM, decrease strength, impaired gait/ balance, decrease ADL/ functional abilitiies, decrease activity tolerance, decrease flexibility/ joint mobility and decrease transfer abilities   Treatment Plan may include any combination of the following: Therapeutic exercise, Therapeutic activities, Neuromuscular re-education, Physical agent/modality, Gait/balance training, Manual therapy, Patient education, Self Care training, Functional mobility training and Home safety training  Patient / Family readiness to learn indicated by: asking questions  Persons(s) to be included in education: patient (P)  Barriers to Learning/Limitations: None  Patient Goal (s): to get shoulder back to normal  Patient Self Reported Health Status: excellent  Rehabilitation Potential: good    Short Term Goals: To be accomplished in 1 weeks:  1. Patient will demonstrate compliance with HEP in order to improve L shoulder mobility    Long Term Goals: To be accomplished in 6 weeks:  1. Patient will improve FOTO score by 38 points in order to demonstrate a significant improvement in function. 2. Patient will improve L shoulder PROM flex/scap to 150 degrees in order to increase ease of ADLs. 3. Patient will report pain at 2/10 or less during ADLs in order to improve quality of life. Frequency / Duration: Patient to be seen 2 times per week for 6 weeks. Patient/ Caregiver education and instruction: Diagnosis, prognosis, exercises   [x]  Plan of care has been reviewed with PTA    G-Codes (GP)  Carry   Current  CL= 60-79%    Goal  CJ= 20-39%     The severity rating is based on clinical judgment and the FOTO score. Certification Period: 1/26/18-3/24/18    Michael iWlls, PT 1/26/2018 2:40 PM    ________________________________________________________________________    I certify that the above Therapy Services are being furnished while the patient is under my care.  I agree with the treatment plan and certify that this therapy is necessary.     Physician's Signature:____________________  Date:____________Time: _________    Please sign and return to [facility]  [address] [place] [address]  [phone] fax

## 2018-01-31 ENCOUNTER — HOSPITAL ENCOUNTER (OUTPATIENT)
Dept: PHYSICAL THERAPY | Age: 68
Discharge: HOME | End: 2018-01-31
Payer: MEDICARE

## 2018-01-31 PROCEDURE — 97110 THERAPEUTIC EXERCISES: CPT

## 2018-01-31 NOTE — PROGRESS NOTES
PT DAILY TREATMENT NOTE - [facility]     Patient Name: Vira Cheek  Date:2018  : 1950  [x]  Patient  Verified  Payor: [place] MEDICARE / Plan: [place] MEDICARE PART A & B / Product Type: Medicare /    In time: 11:57  Out time: 12:41  Total Treatment Time (min): 44  Total Timed Codes (min): 34  1:1 Treatment Time ( only): 23   Visit #: 2 of 12    Treatment Area: Left shoulder pain [M25.512]    SUBJECTIVE  Pain Level (0-10 scale): 3/10  Any medication changes, allergies to medications, adverse drug reactions, diagnosis change, or new procedure performed?: [x] No    [] Yes (see summary sheet for update)  Subjective functional status/changes:   [] No changes reported  Pt. Reports he is doing about the same. He continues to have difficulty with sleeping.  No difficulty with HEP    OBJECTIVE    Modality rationale: decrease inflammation and decrease pain to improve the patients ability to increase ease of ADLs   Min Type Additional Details    [] Estim:  []Unatt       []IFC  []Premod                        []Other:  []w/ice   []w/heat  Position:  Location:    [] Estim: []Att    []TENS instruct  []NMES                    []Other:  []w/US   []w/ice   []w/heat  Position:  Location:    []  Traction: [] Cervical       []Lumbar                       [] Prone          []Supine                       []Intermittent   []Continuous Lbs:  [] before manual  [] after manual    []  Ultrasound: []Continuous   [] Pulsed                           []1MHz   []3MHz W/cm2:  Location:    []  Iontophoresis with dexamethasone         Location: [] Take home patch   [] In clinic   10 [x]  Ice     []  heat  []  Ice massage  []  Laser   []  Anodyne Position: seated  Location: L shoulder    []  Laser with stim  []  Other:  Position:  Location:    []  Vasopneumatic Device Pressure:       [] lo [] med [] hi   Temperature: [] lo [] med [] hi   [x] Skin assessment post-treatment:  [x]intact []redness- no adverse reaction    []redness - adverse reaction:     29 min Therapeutic Exercise:  [x] See flow sheet :   Rationale: increase ROM and increase strength to improve the patients ability to increase ease of ADLs    5 min Manual Therapy:  scap mobs   Rationale: decrease pain, increase ROM and increase tissue extensibility to increase ease of ADLs          With   [x] TE   [] TA   [] neuro   [] other: Patient Education: [x] Review HEP    [] Progressed/Changed HEP based on:   [] positioning   [] body mechanics   [] transfers   [] heat/ice application    [] other:      Other Objective/Functional Measures:   Pt. Flex/abd PROM was ~90 degrees today  He required cues to relax arm during pendulums  He required cues to reduce his force during isometrics     Pain Level (0-10 scale) post treatment: 0/10    ASSESSMENT/Changes in Function:  Pt. Initiated PT and is compliant with his HEP. Patient will continue to benefit from skilled PT services to modify and progress therapeutic interventions, address functional mobility deficits, address ROM deficits, address strength deficits, analyze and address soft tissue restrictions, analyze and cue movement patterns, analyze and modify body mechanics/ergonomics and assess and modify postural abnormalities to attain remaining goals. Progress towards goals / Updated goals:  Short Term Goals: To be accomplished in 1 weeks:  1. Patient will demonstrate compliance with HEP in order to improve L shoulder mobility  Met      Long Term Goals: To be accomplished in 6 weeks:  1. Patient will improve FOTO score by 38 points in order to demonstrate a significant improvement in function. 2. Patient will improve L shoulder PROM flex/scap to 150 degrees in order to increase ease of ADLs. 3. Patient will report pain at 2/10 or less during ADLs in order to improve quality of life.      PLAN  []  Upgrade activities as tolerated     [x]  Continue plan of care  []  Update interventions per flow sheet       []  Discharge due to:_  [] Other:_      Alice Khanna, PT 1/31/2018  12:16 PM    Future Appointments  Date Time Provider Patrica Marni   2/2/2018 1:00 PM [facility] PT [address] MMCPTPB [facility]   2/6/2018 10:00 AM Danielle Waters, PTA [email or web address] [facility]   2/8/2018 10:30 AM Danielle Waters, PTA [id number] [facility]   2/12/2018 10:30 AM Alice Khanna, PT [id number] [facility]   2/14/2018 10:00 AM Alice Khanna, PT [id number] [facility]   2/19/2018 11:00 AM Alice Khanna, PT [id number] [facility]   2/22/2018 7:45 AM [facility] NURSE VISIT [facility] STEVE SCHED   2/22/2018 10:00 AM Alice Khanna, PT [id number] [facility]   2/26/2018 8:00 AM Alice Khanna, PT [id number] [facility]   3/1/2018 8:00 AM Alice Khanna, PT [id number] [facility]   3/1/2018 2:00 PM Matilda Lester MD [facility] STEVE SCHED   3/5/2018 8:00 AM Alice Khanna, PT [id number] [facility]   3/8/2018 8:00 AM Alice Khanna, PT [id number] [facility]

## 2018-02-02 ENCOUNTER — HOSPITAL ENCOUNTER (OUTPATIENT)
Dept: PHYSICAL THERAPY | Age: 68
Discharge: HOME | End: 2018-02-02
Payer: MEDICARE

## 2018-02-02 PROCEDURE — 97110 THERAPEUTIC EXERCISES: CPT

## 2018-02-02 PROCEDURE — 97140 MANUAL THERAPY 1/> REGIONS: CPT

## 2018-02-02 PROCEDURE — 97016 VASOPNEUMATIC DEVICE THERAPY: CPT

## 2018-02-02 NOTE — PROGRESS NOTES
PT DAILY TREATMENT NOTE - [facility]     Patient Name: Windy Leach  Date:2018  : 1950  [x]  Patient  Verified  Payor: [place] MEDICARE / Plan: [place] MEDICARE PART A & B / Product Type: Medicare /    In time:1:00  Out time:2:00  Total Treatment Time (min): 60  Total Timed Codes (min): 45  1:1 Treatment Time ( [address] only): 35   Visit #: 3 of 12    Treatment Area: Left shoulder pain [M25.512]    SUBJECTIVE  Pain Level (0-10 scale): 3/10  Any medication changes, allergies to medications, adverse drug reactions, diagnosis change, or new procedure performed?: [x] No    [] Yes (see summary sheet for update)  Subjective functional status/changes:   [] No changes reported  Pt reports sleeping is still a challenge. He is taking pain medication before coming to therapy so he can relax to get his arm moved. He is R hand dominant. He has some difficulty with dressing and notes he had increased pain yesterday and this morning but is feeling better now.     OBJECTIVE    Modality rationale: decrease inflammation and decrease pain to improve the patients ability to improve progression through protocol   Min Type Additional Details    [] Estim:  []Unatt       []IFC  []Premod                        []Other:  []w/ice   []w/heat  Position:  Location:    [] Estim: []Att    []TENS instruct  []NMES                    []Other:  []w/US   []w/ice   []w/heat  Position:  Location:    []  Traction: [] Cervical       []Lumbar                       [] Prone          []Supine                       []Intermittent   []Continuous Lbs:  [] before manual  [] after manual    []  Ultrasound: []Continuous   [] Pulsed                           []1MHz   []3MHz W/cm2:  Location:    []  Iontophoresis with dexamethasone         Location: [] Take home patch   [] In clinic    []  Ice     []  heat  []  Ice massage  []  Laser   []  Anodyne Position:   Location:    []  Laser with stim  []  Other:  Position:  Location:   15 [x]  Vasopneumatic Device Pressure: [x] lo [] med [] hi   Temperature: [x] lo [] med [] hi   [x] Skin assessment post-treatment:  [x]intact []redness- no adverse reaction    []redness - adverse reaction:     30 min Therapeutic Exercise:  [x] See flow sheet :   Rationale: increase ROM and increase strength to improve the patients ability to progress through protocol for ease of performing ADLs    15 min Manual Therapy:  PROM with oscillations   Rationale: decrease pain, increase ROM and increase tissue extensibility to improve progression through protocol for ease of performing ADLs       With   [] TE   [] TA   [] neuro   [] other: Patient Education: [x] Review HEP    [] Progressed/Changed HEP based on:   [] positioning   [] body mechanics   [] transfers   [] heat/ice application    [] other:      Other Objective/Functional Measures:   Continues to have approximately 90 degrees flexion/abduction PROM  Improved relaxation with manual   Educated on self 2 finger resistance for isometrics sub max  Decreased scap mobility noted during PROM   Pt pleased with game ready for ice    Pain Level (0-10 scale) post treatment: 0/10    ASSESSMENT/Changes in Function: Pt progressing well towards initial goals and protocol. He is compliant with HEP and sling use. He has most pain when sleeping and dressing. He has improving PROM and will continue to benefit from further therapy for progression through protective phase to allow for adequate tendon healing while progressing mobility. Patient will continue to benefit from skilled PT services to modify and progress therapeutic interventions, address functional mobility deficits, address ROM deficits, address strength deficits, assess and modify postural abnormalities and instruct in home and community integration to attain remaining goals. Progress towards goals / Updated goals:  Short Term Goals: To be accomplished in 1 weeks:  1.  Patient will demonstrate compliance with HEP in order to improve L shoulder mobility  Met     Long Term Goals: To be accomplished in 6 weeks:  1. Patient will improve FOTO score by 38 points in order to demonstrate a significant improvement in function. 2. Patient will improve L shoulder PROM flex/scap to 150 degrees in order to increase ease of ADLs. 3. Patient will report pain at 2/10 or less during ADLs in order to improve quality of life.      PLAN  [x]  Upgrade activities as tolerated     [x]  Continue plan of care  []  Update interventions per flow sheet       []  Discharge due to:_  []  Other:_      Jules Bartholomew PTA 2/2/2018  10:02 AM    Future Appointments  Date Time Provider Patrica Grider   2/2/2018 1:00 PM Jules Bartholomew PTA [email or web address] [facility]   2/6/2018 10:00 AM Jules Bartholomew PTA MMCPTPB [facility]   2/8/2018 10:30 AM Jules Bartholomew PTA MMCPTPB [facility]   2/12/2018 10:30 AM Chapo Curry, PT [id number] [facility]   2/14/2018 10:00 AM Chapo Curry, PT [email or web address] [facility]   2/19/2018 11:00 AM Chapo Curry, PT [email or web address] [facility]   2/22/2018 7:45 AM [facility] NURSE VISIT [facility] STEVE SCHED   2/22/2018 10:00 AM Chapo Curry, PT [email or web address] [facility]   2/26/2018 8:00 AM Chapo Curry, PT [email or web address] [facility]   3/1/2018 8:00 AM Chapo Curry, PT [email or web address] [facility]   3/1/2018 2:00 PM Fredi Muir MD [facility] STEVE SCHED   3/5/2018 8:00 AM Chapo Curry, PT [email or web address] [facility]   3/8/2018 8:00 AM Chapo Curry, PT [email or web address] [facility]

## 2018-02-06 ENCOUNTER — HOSPITAL ENCOUNTER (OUTPATIENT)
Dept: PHYSICAL THERAPY | Age: 68
Discharge: HOME | End: 2018-02-06
Payer: MEDICARE

## 2018-02-06 PROCEDURE — 97110 THERAPEUTIC EXERCISES: CPT

## 2018-02-06 PROCEDURE — 97140 MANUAL THERAPY 1/> REGIONS: CPT

## 2018-02-06 PROCEDURE — 97016 VASOPNEUMATIC DEVICE THERAPY: CPT

## 2018-02-06 NOTE — PROGRESS NOTES
PT DAILY TREATMENT NOTE - [facility]     Patient Name: Lauren Calles  Date:2018  : 1950  [x]  Patient  Verified  Payor: [place] MEDICARE / Plan: [place] MEDICARE PART A & B / Product Type: Medicare /    In time: 10:35  Out time: 11:26  Total Treatment Time (min): 51  Total Timed Codes (min): 36  1:1 Treatment Time ( only): 36   Visit #: 4 of 12    Treatment Area: Left shoulder pain [M25.512]    SUBJECTIVE  Pain Level (0-10 scale): 2/10 with meds  Any medication changes, allergies to medications, adverse drug reactions, diagnosis change, or new procedure performed?: [x] No    [] Yes (see summary sheet for update)  Subjective functional status/changes:   [] No changes reported  Pt reports pain was an 8/10 earlier this morning around 4 A.M. In which he had to take 2 pain pills. He is sleeping in bed but isn't sure if he maybe rolled on his shoulder but he tries to use pillows for comfort.      OBJECTIVE    Modality rationale: decrease inflammation and decrease pain to improve the patients ability to progress through protocol and perform ADLs with ease   Min Type Additional Details    [] Estim:  []Unatt       []IFC  []Premod                        []Other:  []w/ice   []w/heat  Position:  Location:    [] Estim: []Att    []TENS instruct  []NMES                    []Other:  []w/US   []w/ice   []w/heat  Position:  Location:    []  Traction: [] Cervical       []Lumbar                       [] Prone          []Supine                       []Intermittent   []Continuous Lbs:  [] before manual  [] after manual    []  Ultrasound: []Continuous   [] Pulsed                           []1MHz   []3MHz W/cm2:  Location:    []  Iontophoresis with dexamethasone         Location: [] Take home patch   [] In clinic    []  Ice     []  heat  []  Ice massage  []  Laser   []  Anodyne Position:  Location:    []  Laser with stim  []  Other:  Position:  Location:   15 [x]  Vasopneumatic Device Pressure:       [x] lo [] med [] hi Temperature: [x] lo [] med [] hi   [x] Skin assessment post-treatment:  [x]intact []redness- no adverse reaction    []redness - adverse reaction:     21 min Therapeutic Exercise:  [x] See flow sheet :   Rationale: increase ROM and increase strength to improve the patients ability to progress through protocol to allow for tendon healing and return to ADLs    15 min Manual Therapy:  Scap mobs; PROM with oscillations; [address] clocks   Rationale: decrease pain, increase ROM and increase tissue extensibility to improve progression through protocol for eventual return to performing ADLs          With   [] TE   [] TA   [] neuro   [] other: Patient Education: [x] Review HEP    [] Progressed/Changed HEP based on:   [] positioning   [] body mechanics   [] transfers   [] heat/ice application    [] other:      Other Objective/Functional Measures:   PROM shoulder flexion approximately 110 degrees post scap mobs and GH clocks  Pt with improving relaxation ability during manual  No increased pain during exercises  Reminded of sub max for isometrics  Cues for pendulums to use hip motions and momentum versus muscle activation     Pain Level (0-10 scale) post treatment: 0/10    ASSESSMENT/Changes in Function: Pt continues to progress with improving PROM and decreased pain. He continues to have difficulty sleeping at night as he doffs the sling but potentially alters shoulder causing pain up to 8/10. Will continue progressing per protocol to allow for tendon healing while preparing ROM/strength for eventual ADLs when allowed per MD and protocol. Patient will continue to benefit from skilled PT services to modify and progress therapeutic interventions, address functional mobility deficits, address ROM deficits, address strength deficits, analyze and address soft tissue restrictions, analyze and cue movement patterns, assess and modify postural abnormalities and instruct in home and community integration to attain remaining goals. Progress towards goals / Updated goals:  Short Term Goals: To be accomplished in 1 weeks:  1. Patient will demonstrate compliance with HEP in order to improve L shoulder mobility  Met     Long Term Goals: To be accomplished in 6 weeks:  1. Patient will improve FOTO score by 38 points in order to demonstrate a significant improvement in function. 2. Patient will improve L shoulder PROM flex/scap to 150 degrees in order to increase ease of ADLs. Progressing ~110 degrees (2/6/18)  3. Patient will report pain at 2/10 or less during ADLs in order to improve quality of life.      PLAN  [x]  Upgrade activities as tolerated     [x]  Continue plan of care  []  Update interventions per flow sheet       []  Discharge due to:_  []  Other:_      Navdeep Carr PTA 2/6/2018  10:18 AM    Future Appointments  Date Time Provider Patrica Grider   2/6/2018 10:30 AM Navdeep Carr PTA [id number] [facility]   2/8/2018 10:30 AM Navdeep Carr PTA MMCPTPB [facility]   2/12/2018 10:30 AM Merry Francisco, PT [id number] [facility]   2/14/2018 10:00 AM Merry Francisco, PT [id number] [facility]   2/19/2018 11:00 AM Merry Francisco, PT [id number] [facility]   2/22/2018 7:45 AM [facility] NURSE VISIT [facility] STEVE SCHED   2/22/2018 10:00 AM Merry Francisco, PT [email or web address] [facility]   2/26/2018 8:00 AM Merry Francisco, PT [email or web address] [facility]   3/1/2018 8:00 AM Merry Francisco, PT [email or web address] [facility]   3/1/2018 2:00 PM Karely Killian MD [facility] STEVE SCHED   3/5/2018 8:00 AM Merry Francisco, PT [id number] [facility]   3/8/2018 8:00 AM Rupali Singh, PT MMCPTPB [facility]

## 2018-02-08 ENCOUNTER — HOSPITAL ENCOUNTER (OUTPATIENT)
Dept: PHYSICAL THERAPY | Age: 68
Discharge: HOME | End: 2018-02-08
Payer: MEDICARE

## 2018-02-08 PROCEDURE — 97110 THERAPEUTIC EXERCISES: CPT

## 2018-02-08 PROCEDURE — 97140 MANUAL THERAPY 1/> REGIONS: CPT

## 2018-02-08 PROCEDURE — 97016 VASOPNEUMATIC DEVICE THERAPY: CPT

## 2018-02-08 NOTE — PROGRESS NOTES
PT DAILY TREATMENT NOTE - [facility]     Patient Name: Linda Jaquez  Date:2018  : 1950  [x]  Patient  Verified  Payor: [employer] Payor / Plan: [place] MEDICARE PART A & B / Product Type: Medicare /    In time:10:30  Out time:11:15  Total Treatment Time (min): 45  Total Timed Codes (min): 30  1:1 Treatment Time ( [address] only): 30   Visit #: 5 of 12    Treatment Area: Left shoulder pain [M25.512]    SUBJECTIVE  Pain Level (0-10 scale): 2/10  Any medication changes, allergies to medications, adverse drug reactions, diagnosis change, or new procedure performed?: [x] No    [] Yes (see summary sheet for update)  Subjective functional status/changes:   [] No changes reported  Pt reports less pain at night after sleeping with his sling on just is more uncomfortable. He states he only had one pain medicine today so is a little more sore than last session.      OBJECTIVE    Modality rationale: decrease inflammation and decrease pain to improve the patients ability to improve ease of progressing through protocol for eventual return to ADLs   Min Type Additional Details    [] Estim:  []Unatt       []IFC  []Premod                        []Other:  []w/ice   []w/heat  Position:  Location:    [] Estim: []Att    []TENS instruct  []NMES                    []Other:  []w/US   []w/ice   []w/heat  Position:  Location:    []  Traction: [] Cervical       []Lumbar                       [] Prone          []Supine                       []Intermittent   []Continuous Lbs:  [] before manual  [] after manual    []  Ultrasound: []Continuous   [] Pulsed                           []1MHz   []3MHz W/cm2:  Location:    []  Iontophoresis with dexamethasone         Location: [] Take home patch   [] In clinic    []  Ice     []  heat  []  Ice massage  []  Laser   []  Anodyne Position:  Location:    []  Laser with stim  []  Other:  Position:  Location:   15 [x]  Vasopneumatic Device Pressure:       [x] lo [] med [] hi   Temperature: [x] lo [] med [] hi [x] Skin assessment post-treatment:  [x]intact []redness- no adverse reaction    []redness - adverse reaction:     15 min Therapeutic Exercise:  [x] See flow sheet :   Rationale: increase ROM and increase strength to improve the patients ability to progress per protocol for tendon healing and eventual return to performing ADLs    15 min Manual Therapy:  scap mobs, GH clocks, PROM with oscillations; TPR to L UT/pec major   Rationale: decrease pain, increase ROM and increase tissue extensibility to improve mobility for eventual return to ADLs          With   [] TE   [] TA   [] neuro   [] other: Patient Education: [x] Review HEP    [] Progressed/Changed HEP based on:   [] positioning   [] body mechanics   [] transfers   [] heat/ice application    [] other:      Other Objective/Functional Measures:   PROM flexion: 124 degrees  PROM scaption: 125 degrees  PROM ER with shoulder at neutral: 25 degrees  Decreased upwards scap rotation  Improved form with exercises especially sub max with isometrics  Continues to be challenged with pendulums  FOTO: 41    Pain Level (0-10 scale) post treatment: 0/10    ASSESSMENT/Changes in Function: Pt continues to make steady progress towards all goals in therapy. He has improved with flexion/scaption PROM to 124/125 degrees with some limitation still in upward scap rotation. He has improvement in his FOTO score indicating improved functional ability. He is compliant with sling use and HEP for independent management of symptoms and healing but does still have some pain/discomfort with sleeping at night. Will continue progressing per protocol to allow for tendon healing and full functional use of L UE for ADLs as appropriate per MD recommendations and protocol.     Patient will continue to benefit from skilled PT services to modify and progress therapeutic interventions, address functional mobility deficits, address ROM deficits, address strength deficits, analyze and address soft tissue restrictions, analyze and cue movement patterns, analyze and modify body mechanics/ergonomics, assess and modify postural abnormalities, address imbalance/dizziness and instruct in home and community integration to attain remaining goals. Progress towards goals / Updated goals:  Short Term Goals: To be accomplished in 1 weeks:  1. Patient will demonstrate compliance with HEP in order to improve L shoulder mobility  Met     Long Term Goals: To be accomplished in 6 weeks:  1. Patient will improve FOTO score by 38 points in order to demonstrate a significant improvement in function. Progressing 15 point improvement (2/8/18)  2. Patient will improve L shoulder PROM flex/scap to 150 degrees in order to increase ease of ADLs. Progressing ~110 degrees (2/6/18) Progressing 124/125 degrees (2/8/18)  3. Patient will report pain at 2/10 or less during ADLs in order to improve quality of life.  Progressing 2/10 but using pain medication (2/8/18)    PLAN  [x]  Upgrade activities as tolerated     [x]  Continue plan of care  []  Update interventions per flow sheet       []  Discharge due to:_  []  Other:_      Claudell Easter, PTA 2/8/2018  2:25 PM    Future Appointments  Date Time Provider Patrica Grider   2/12/2018 10:30 AM Salty Distance, PT [email or web address] [facility]   2/14/2018 10:00 AM Salty Distance, PT [id number] [facility]   2/19/2018 11:00 AM Salty Distance, PT [id number] [facility]   2/22/2018 7:45 AM [facility] NURSE VISIT [facility] STEVE SCHED   2/22/2018 10:00 AM Salty Distance, PT [email or web address] [facility]   2/26/2018 8:00 AM Salty Distance, PT [email or web address] [facility]   3/1/2018 8:00 AM Salty Distance, PT [email or web address] [facility]   3/1/2018 2:00 PM Sandra Sagsatume MD [facility] STEVE SCHED   3/5/2018 8:00 AM Salty Distance, PT [id number] [facility]   3/8/2018 8:00 AM Salty Jas, PT [email or web address] [facility]

## 2018-02-12 ENCOUNTER — HOSPITAL ENCOUNTER (OUTPATIENT)
Dept: PHYSICAL THERAPY | Age: 68
Discharge: HOME | End: 2018-02-12
Payer: MEDICARE

## 2018-02-12 PROCEDURE — 97140 MANUAL THERAPY 1/> REGIONS: CPT

## 2018-02-12 PROCEDURE — 97110 THERAPEUTIC EXERCISES: CPT

## 2018-02-12 PROCEDURE — 97016 VASOPNEUMATIC DEVICE THERAPY: CPT

## 2018-02-12 NOTE — PROGRESS NOTES
PT DAILY TREATMENT NOTE - [facility]     Patient Name: Lucilla Sever  Date:2018  : 1950  [x]  Patient  Verified  Payor: Kerry Blend / Plan: [place] MED[place] & [place] / Product Type: Medicare /    In time: 10:30  Out time: 11:15  Total Treatment Time (min):  45  Total Timed Codes (min):  30  1:1 Treatment Time ( only): 30   Visit #: 6 of 12    Treatment Area: Left shoulder pain [M25.512]    SUBJECTIVE  Pain Level (0-10 scale): 2/10  Any medication changes, allergies to medications, adverse drug reactions, diagnosis change, or new procedure performed?: [x] No    [] Yes (see summary sheet for update)  Subjective functional status/changes:   [] No changes reported  Pt. Reports he is a little sore from sleeping wrong. He reports taking less pain medication.       OBJECTIVE    Modality rationale: decrease edema, decrease inflammation and decrease pain to improve the patients ability to increase ease of ADLs   Min Type Additional Details    [] Estim:  []Unatt       []IFC  []Premod                        []Other:  []w/ice   []w/heat  Position:  Location:    [] Estim: []Att    []TENS instruct  []NMES                    []Other:  []w/US   []w/ice   []w/heat  Position:  Location:    []  Traction: [] Cervical       []Lumbar                       [] Prone          []Supine                       []Intermittent   []Continuous Lbs:  [] before manual  [] after manual    []  Ultrasound: []Continuous   [] Pulsed                           []1MHz   []3MHz W/cm2:  Location:    []  Iontophoresis with dexamethasone         Location: [] Take home patch   [] In clinic    []  Ice     []  heat  []  Ice massage  []  Laser   []  Anodyne Position:  Location:    []  Laser with stim  []  Other:  Position:  Location:   15 [x]  Vasopneumatic Device Pressure:       [] lo [x] med [] hi   Temperature: [x] lo [] med [] hi   [x] Skin assessment post-treatment:  [x]intact []redness- no adverse reaction    []redness - adverse reaction:     20 min Therapeutic Exercise:  [x] See flow sheet :   Rationale: increase ROM and increase strength to improve the patients ability to increase ease of ADLs    10 min: manual: scap mobs, trigger point release infraspinatus, UT, pec major  Rationale: increase ROM for increased ease of ADls          With   [x] TE   [] TA   [] neuro   [] other: Patient Education: [x] Review HEP    [] Progressed/Changed HEP based on:   [] positioning   [] body mechanics   [] transfers   [] heat/ice application    [] other:      Other Objective/Functional Measures:   L shoulder PROM flex: 110 scap: 120 degrees  Pt. Had poor tolerance to medium pressure on game ready so reduced back to low  He had multiple trigger points along posterior shoulder       Pain Level (0-10 scale) post treatment: 2/10    ASSESSMENT/Changes in Function:  Pt. Is progressing slowly towards goals. He continues to have increased pain with sleeping and he had mild decrease in L shoulder PROM today compared to last visit. Patient will continue to benefit from skilled PT services to modify and progress therapeutic interventions, address functional mobility deficits, address ROM deficits, address strength deficits, analyze and address soft tissue restrictions, analyze and cue movement patterns and analyze and modify body mechanics/ergonomics to attain remaining goals. Progress towards goals / Updated goals:  Short Term Goals: To be accomplished in 1 weeks:  1. Patient will demonstrate compliance with HEP in order to improve L shoulder mobility  Met       Long Term Goals: To be accomplished in 6 weeks:  1. Patient will improve FOTO score by 38 points in order to demonstrate a significant improvement in function. Progressing 15 point improvement (2/8/18)  2. Patient will improve L shoulder PROM flex/scap to 150 degrees in order to increase ease of ADLs. Progressing ~110 degrees (2/6/18) Progressing 124/125 degrees (2/8/18)  3.  Patient will report pain at 2/10 or less during ADLs in order to improve quality of life.  Progressing 2/10 using less pain medication (2/12/18)    PLAN  []  Upgrade activities as tolerated     [x]  Continue plan of care  []  Update interventions per flow sheet       []  Discharge due to:_  []  Other:_      Celine Knight, PT 2/12/2018  10:58 AM    Future Appointments  Date Time Provider Patrica Grider   2/15/2018 9:30 AM Celine Knight, PT [id number] [facility]   2/19/2018 11:00 AM Celine Knight, PT [id number] [facility]   2/22/2018 7:45 AM [facility] NURSE VISIT [facility]   2/22/2018 10:00 AM Celine Knight, PT [email or web address] [facility]   2/26/2018 8:00 AM Celine Knight, PT [email or web address] [facility]   3/1/2018 8:00 AM Mary Schwartz, PTA [email or web address] [facility]   3/1/2018 2:00 PM Theresa Freed MD [facility]   3/5/2018 8:00 AM Celine Knight, PT [email or web address] [facility]   3/8/2018 8:00 AM Mary Schwartz PTA [id number] [facility]

## 2018-02-14 ENCOUNTER — APPOINTMENT (OUTPATIENT)
Dept: PHYSICAL THERAPY | Age: 68
End: 2018-02-14
Payer: MEDICARE

## 2018-02-15 ENCOUNTER — HOSPITAL ENCOUNTER (OUTPATIENT)
Dept: PHYSICAL THERAPY | Age: 68
Discharge: HOME | End: 2018-02-15
Payer: MEDICARE

## 2018-02-15 PROCEDURE — 97140 MANUAL THERAPY 1/> REGIONS: CPT

## 2018-02-15 PROCEDURE — 97110 THERAPEUTIC EXERCISES: CPT

## 2018-02-15 NOTE — PROGRESS NOTES
PT DAILY TREATMENT NOTE - [facility]     Patient Name: Isa Marin  Date:2/15/2018  : 1950  [x]  Patient  Verified  Payor: Nathaniel Blandon / Plan: [place] MEDICARE PART A & B / Product Type: Medicare /    In time: 9:30  Out time: 10:10  Total Treatment Time (min): 40  Total Timed Codes (min): 30  1:1 Treatment Time ( [address] only): 30   Visit #: 7 of 12    Treatment Area: Left shoulder pain [M25.512]    SUBJECTIVE  Pain Level (0-10 scale): 1/10  Any medication changes, allergies to medications, adverse drug reactions, diagnosis change, or new procedure performed?: [x] No    [] Yes (see summary sheet for update)  Subjective functional status/changes:   [] No changes reported  Pt. Reports he is doing pretty good today but is back to taking 2 pain pills.      OBJECTIVE    Modality rationale: decrease inflammation and decrease pain to improve the patients ability to increase ease of ADLs   Min Type Additional Details    [] Estim:  []Unatt       []IFC  []Premod                        []Other:  []w/ice   []w/heat  Position:  Location:    [] Estim: []Att    []TENS instruct  []NMES                    []Other:  []w/US   []w/ice   []w/heat  Position:  Location:    []  Traction: [] Cervical       []Lumbar                       [] Prone          []Supine                       []Intermittent   []Continuous Lbs:  [] before manual  [] after manual    []  Ultrasound: []Continuous   [] Pulsed                           []1MHz   []3MHz W/cm2:  Location:    []  Iontophoresis with dexamethasone         Location: [] Take home patch   [] In clinic   10 [x]  Ice     []  heat  []  Ice massage  []  Laser   []  Anodyne Position: seated  Location: L shoulder    []  Laser with stim  []  Other:  Position:  Location:    []  Vasopneumatic Device Pressure:       [] lo [] med [] hi   Temperature: [] lo [] med [] hi   [x] Skin assessment post-treatment:  [x]intact []redness- no adverse reaction    []redness - adverse reaction:     20 min Therapeutic Exercise:  [x] See flow sheet :   Rationale: increase ROM and increase strength to improve the patients ability to increase ease of ADLs    10 min Manual Therapy:  scap mobs, trigger point release infraspinatus   Rationale: decrease pain, increase ROM and increase tissue extensibility to increase ease of ADLs          With   [x] TE   [] TA   [] neuro   [] other: Patient Education: [x] Review HEP    [] Progressed/Changed HEP based on:   [] positioning   [] body mechanics   [] transfers   [] heat/ice application    [] other:      Other Objective/Functional Measures:   Pt. Tolerated PT well with no reports of increased pain  He continues to have pain with PROM flexion and has more mobility with scaption  He continues to have poor IR PROM     Pain Level (0-10 scale) post treatment: 1/10    ASSESSMENT/Changes in Function:  Pt. Is progressing slowly towards goals. He continues to have decreased L shoulder PROM with empty end feels due to pain. Patient will continue to benefit from skilled PT services to modify and progress therapeutic interventions, address functional mobility deficits, address ROM deficits, address strength deficits, analyze and address soft tissue restrictions, analyze and cue movement patterns and analyze and modify body mechanics/ergonomics to attain remaining goals. Progress towards goals / Updated goals:  Short Term Goals: To be accomplished in 1 weeks:  1. Patient will demonstrate compliance with HEP in order to improve L shoulder mobility  Met        Long Term Goals: To be accomplished in 6 weeks:  1. Patient will improve FOTO score by 38 points in order to demonstrate a significant improvement in function. Progressing 15 point improvement (2/8/18)  2. Patient will improve L shoulder PROM flex/scap to 150 degrees in order to increase ease of ADLs. Progressing ~110 degrees (2/6/18) Progressing 124/125 degrees (2/8/18)  3.  Patient will report pain at 2/10 or less during ADLs in order to improve quality of life.  Progressing 2/10 using less pain medication (2/12/18)    PLAN  []  Upgrade activities as tolerated     [x]  Continue plan of care  []  Update interventions per flow sheet       []  Discharge due to:_  []  Other:_      Selena Gonzales, PT 2/15/2018  9:33 AM    Future Appointments  Date Time Provider Patrica Marni   2/19/2018 11:00 AM Selena Gonzales PT [id number] [address]   2/22/2018 7:45 AM [facility] NURSE VISIT [facility] STEVE SCHED   2/22/2018 10:00 AM Selena Gonzales, PT [id number] [address]   2/26/2018 8:00 AM Selena Gonzales, PT [id number] [address]   3/1/2018 8:00 AM Lupe Ferro PTA [email or web address] [address]   3/1/2018 2:00 PM Taran Emanuel MD [facility] STEVE SCHED   3/5/2018 8:00 AM Selena Gonzales PT [email or web address] [address]   3/8/2018 8:00 AM Lupe Ferro PTA [id number] [address]

## 2018-02-19 ENCOUNTER — HOSPITAL ENCOUNTER (OUTPATIENT)
Dept: PHYSICAL THERAPY | Age: 68
Discharge: HOME | End: 2018-02-19
Payer: MEDICARE

## 2018-02-19 PROCEDURE — G8984 CARRY CURRENT STATUS: HCPCS

## 2018-02-19 PROCEDURE — 97110 THERAPEUTIC EXERCISES: CPT

## 2018-02-19 PROCEDURE — 97140 MANUAL THERAPY 1/> REGIONS: CPT

## 2018-02-19 PROCEDURE — G8985 CARRY GOAL STATUS: HCPCS

## 2018-02-19 NOTE — PROGRESS NOTES
PT DAILY TREATMENT NOTE - [facility]     Patient Name: Dahiana Schultz  Date:2018  : 1950  [x]  Patient  Verified  Payor: Chasity Hogue / Plan: [place] MEDICARE PART A & B / Product Type: Medicare /    In time: 11:00  Out time: 11:46  Total Treatment Time (min): 46  Total Timed Codes (min): 36  1:1 Treatment Time ( [address] only): 23   Visit #: 8 of 12    Treatment Area: Left shoulder pain [M25.512]    SUBJECTIVE  Pain Level (0-10 scale): 1/10  Any medication changes, allergies to medications, adverse drug reactions, diagnosis change, or new procedure performed?: [x] No    [] Yes (see summary sheet for update)  Subjective functional status/changes:   [] No changes reported  Pt. Reports he is doing pretty well today. OBJECTIVE    28 min Therapeutic Exercise:  [x] See flow sheet :   Rationale: increase ROM and increase strength to improve the patients ability to increase ease of ADLs    8 min Manual Therapy:  scap mobs   Rationale: decrease pain, increase ROM and increase tissue extensibility to increase ease of ADLs          With   [x] TE   [] TA   [] neuro   [] other: Patient Education: [x] Review HEP    [] Progressed/Changed HEP based on:   [] positioning   [] body mechanics   [] transfers   [] heat/ice application    [] other:      Other Objective/Functional Measures:   L shoulder PROM flex: 122 degrees scaption; 135 degrees ER: 40 degrees   Pt.  Tolerated PT well with no reports of increased pain  He tolerated pulleys well and was educated on performing for HEP    Pain Level (0-10 scale) post treatment: 1/10    ASSESSMENT/Changes in Function:      []  See Plan of Care  [x]  See progress note/recertification  []  See Discharge Summary         Progress towards goals / Updated goals:  See re-cert    PLAN  []  Upgrade activities as tolerated     [x]  Continue plan of care  []  Update interventions per flow sheet       []  Discharge due to:_  []  Other:_      Rupali Singh, PT 2018  11:51 AM    Future Appointments  Date Time Provider Patrica Marni   2/22/2018 7:45 AM [facility] NURSE VISIT [facility] STEVE SCHED   2/22/2018 10:00 AM Jose Lawler, PT [email or web address] [facility]   2/26/2018 8:00 AM Jose Lawler, PT [id number] [facility]   3/1/2018 8:00 AM Carrie Longoria PTA [id number] [facility]   3/1/2018 2:00 PM Lalit Maza MD [facility] STEVE SCHED   3/5/2018 8:00 AM Jose Lawler, PT [email or web address] [facility]   3/8/2018 8:00 AM Carrie Longoria PTA [id number] [facility]

## 2018-02-19 NOTE — PROGRESS NOTES
[facility] - [address]  [phone] fax    Continued Plan of Care/ Re-certification for Physical Therapy Services    Patient name: Dahiana Schultz Start of Care:  18   Referral source: Patrice Brooks MD : 1950   Medical/Treatment Diagnosis: Left shoulder pain [M25.512] Onset Date: 18     Prior Hospitalization: see medical history Provider#: [id number]   Medications: Verified on Patient Summary List    Comorbidities: arthritis, history or R rotator cuff surgery in 2014 and clavicle fracture in 2017   Prior Level of Function: Ind with ADLs, driving, bike riding   Visits from Start of Care: 8    Missed Visits: 0    The Plan of Care and following information is based on the patient's current status:  Goal: Patient will improve FOTO score by 38 points in order to demonstrate a significant improvement in function. Status at last note/certification:   Current Status: not met    Goal: Patient will improve L shoulder PROM flex/scap to 150 degrees in order to increase ease of ADLs. Status at last note/certification: flex: 95 scap: 90 degrees  Current Status: not met    Goal:  Patient will report pain at 2/10 or less during ADLs in order to improve quality of life. Status at last note/certification:   Current Status: met    Key functional changes: pt. Demonstrates improving shoulder PROM and has less pain, however pain continues to fluctuate in intensity.        Problems/ barriers to goal attainment: none     Problem List: pain affecting function, decrease ROM, decrease strength, impaired gait/ balance, decrease ADL/ functional abilitiies, decrease activity tolerance, decrease flexibility/ joint mobility and decrease transfer abilities    Treatment Plan: Therapeutic exercise, Therapeutic activities, Neuromuscular re-education, Physical agent/modality, Gait/balance training, Manual therapy, Patient education, Self Care training and Functional mobility training     Patient Goal (s) has been updated and includes: to get shoulder back to normal     Goals for this certification period to be accomplished in 6 weeks:  1. Patient will improve FOTO score by 38 points in order to demonstrate a significant improvement in function. 2. Patient will improve L shoulder AROM flex/scaption to 120 degrees in order to increase ease of ADLs. 3. Patient will improve behind back reaching to T12 in order to increase ease of getting dressed. Frequency / Duration: Patient to be seen 2 times per week for 6 weeks:    Assessment / Recommendations: pt. Is progressing with physical therapy. He demonstrates improving L shoulder PROM with empty end feels due to pain. He continues to have fluctuating pain so he continues to require pain medication. L shoulder PROM improved to flex: 122 degrees, scaption: 135 degrees, ER: 40 degrees. He has been compliant with shoulder precautions and sling use. Skilled PT is medically necessary in order to improve L shoulder mobility and strength in order to return to PLOF. G-Codes (GP)  Carry   Current  CK= 40-59%   [id number] Goal  CJ= 20-39%    The severity rating is based on clinical judgment and the FOTO score. Certification Period: 2/19/18-4/20/18    Ofelia Marrero, PT 2/19/2018 11:57 AM    ________________________________________________________________________  I certify that the above Therapy Services are being furnished while the patient is under my care. I agree with the treatment plan and certify that this therapy is necessary. [] I have read the above and request that my patient continue as recommended.   [] I have read the above report and request that my patient continue therapy with the following changes/special instructions: _______________________________________  [] I have read the above report and request that my patient be discharged from therapy    Physician's Signature:________________________________Date:___________Time:__________    Please sign and return to [facility] - [place]ENORIO  [address] [facility]  [phone] fax

## 2018-02-22 ENCOUNTER — HOSPITAL ENCOUNTER (OUTPATIENT)
Dept: PHYSICAL THERAPY | Age: 68
Discharge: HOME | End: 2018-02-22
Payer: MEDICARE

## 2018-02-22 ENCOUNTER — HOSPITAL ENCOUNTER (OUTPATIENT)
Dept: LAB | Age: 68
Discharge: HOME | End: 2018-02-22
Payer: MEDICARE

## 2018-02-22 DIAGNOSIS — E78.5 DYSLIPIDEMIA: ICD-10-CM

## 2018-02-22 LAB
ALBUMIN SERPL-MCNC: 4 G/DL (ref 3.4–5)
ALBUMIN/GLOB SERPL: 1.5 {RATIO} (ref 0.8–1.7)
ALP SERPL-CCNC: 54 U/L (ref 45–117)
ALT SERPL-CCNC: 30 U/L (ref 16–61)
ANION GAP SERPL CALC-SCNC: 6 MMOL/L (ref 3–18)
AST SERPL-CCNC: 23 U/L (ref 15–37)
BILIRUB SERPL-MCNC: 0.5 MG/DL (ref 0.2–1)
BUN SERPL-MCNC: 11 MG/DL (ref 7–18)
BUN/CREAT SERPL: 13 (ref 12–20)
CALCIUM SERPL-MCNC: 8.7 MG/DL (ref 8.5–10.1)
CHLORIDE SERPL-SCNC: 104 MMOL/L (ref 100–108)
CHOLEST SERPL-MCNC: 121 MG/DL
CO2 SERPL-SCNC: 33 MMOL/L (ref 21–32)
CREAT SERPL-MCNC: 0.83 MG/DL (ref 0.6–1.3)
ERYTHROCYTE [DISTWIDTH] IN BLOOD BY AUTOMATED COUNT: 13.6 % (ref 11.6–14.5)
GLOBULIN SER CALC-MCNC: 2.7 G/DL (ref 2–4)
GLUCOSE SERPL-MCNC: 79 MG/DL (ref 74–99)
HCT VFR BLD AUTO: 42 % (ref 36–48)
HDLC SERPL-MCNC: 46 MG/DL (ref 40–60)
HDLC SERPL: 2.6 {RATIO} (ref 0–5)
HGB BLD-MCNC: 13.3 G/DL (ref 13–16)
LDLC SERPL CALC-MCNC: 61.4 MG/DL (ref 0–100)
LIPID PROFILE,FLP: NORMAL
MCH RBC QN AUTO: 30.3 PG (ref 24–34)
MCHC RBC AUTO-ENTMCNC: 31.7 G/DL (ref 31–37)
MCV RBC AUTO: 95.7 FL (ref 74–97)
PLATELET # BLD AUTO: 255 K/UL (ref 135–420)
PMV BLD AUTO: 10.1 FL (ref 9.2–11.8)
POTASSIUM SERPL-SCNC: 4.1 MMOL/L (ref 3.5–5.5)
PROT SERPL-MCNC: 6.7 G/DL (ref 6.4–8.2)
RBC # BLD AUTO: 4.39 M/UL (ref 4.7–5.5)
SODIUM SERPL-SCNC: 143 MMOL/L (ref 136–145)
TRIGL SERPL-MCNC: 68 MG/DL (ref ?–150)
VLDLC SERPL CALC-MCNC: 13.6 MG/DL
WBC # BLD AUTO: 3.9 K/UL (ref 4.6–13.2)

## 2018-02-22 PROCEDURE — 97110 THERAPEUTIC EXERCISES: CPT

## 2018-02-22 PROCEDURE — 36415 COLL VENOUS BLD VENIPUNCTURE: CPT | Performed by: INTERNAL MEDICINE

## 2018-02-22 PROCEDURE — 80053 COMPREHEN METABOLIC PANEL: CPT | Performed by: INTERNAL MEDICINE

## 2018-02-22 PROCEDURE — 80061 LIPID PANEL: CPT | Performed by: INTERNAL MEDICINE

## 2018-02-22 PROCEDURE — 85027 COMPLETE CBC AUTOMATED: CPT | Performed by: INTERNAL MEDICINE

## 2018-02-22 PROCEDURE — 97140 MANUAL THERAPY 1/> REGIONS: CPT

## 2018-02-22 NOTE — PROGRESS NOTES
PT DAILY TREATMENT NOTE - [facility]     Patient Name: Manjit Velez  Date:2018  : 1950  [x]  Patient  Verified  Payor: Sonia Stager / Plan: [place] MEDICARE PART A & B / Product Type: Medicare /    In time: 10:00  Out time: 10:29  Total Treatment Time (min): 29  Total Timed Codes (min): 29  1:1 Treatment Time ([facility] only): 29   Visit #: 9 of 12    Treatment Area: Left shoulder pain [M25.512]    SUBJECTIVE  Pain Level (0-10 scale): 2/10  Any medication changes, allergies to medications, adverse drug reactions, diagnosis change, or new procedure performed?: [x] No    [] Yes (see summary sheet for update)  Subjective functional status/changes:   [] No changes reported  Pt. Reports he tried to trim some pushes which caused increased shoulder and back pain. OBJECTIVE    19 min Therapeutic Exercise:  [x] See flow sheet :   Rationale: increase ROM and increase strength to improve the patients ability to increase ease of ADLs    10 min Manual Therapy:  Trigger point release infraspinatus and supraspinatus, scap mobs   Rationale: decrease pain, increase ROM and increase tissue extensibility to increase ease of ADLs          With   [x] TE   [] TA   [] neuro   [] other: Patient Education: [x] Review HEP    [] Progressed/Changed HEP based on:   [] positioning   [] body mechanics   [] transfers   [] heat/ice application    [] other:      Other Objective/Functional Measures:   Pt. Continues to have decreased PROM with firm end feels, most limitation is with shoulder IR  He had multiple trigger points along infraspinatus today  He required cues to decrease resistance during isometrics      Pain Level (0-10 scale) post treatment: 1/10    ASSESSMENT/Changes in Function:  Pt. Is progressing slowly towards goals. He had increased pain due to not following shoulder precautions. He continues to have decreased L shoulder PROM.     Patient will continue to benefit from skilled PT services to modify and progress therapeutic interventions, address functional mobility deficits, address ROM deficits, address strength deficits, analyze and address soft tissue restrictions, analyze and cue movement patterns and analyze and modify body mechanics/ergonomics to attain remaining goals. Progress towards goals / Updated goals:  1. Patient will improve FOTO score by 38 points in order to demonstrate a significant improvement in function. 2. Patient will improve L shoulder AROM flex/scaption to 120 degrees in order to increase ease of ADLs. 3. Patient will improve behind back reaching to T12 in order to increase ease of getting dressed.      PLAN  []  Upgrade activities as tolerated     [x]  Continue plan of care  []  Update interventions per flow sheet       []  Discharge due to:_  []  Other:_      Teja De La Rosa PT 2/22/2018  9:59 AM    Future Appointments  Date Time Provider Patrica Grider   2/22/2018 10:00 AM Teja De La Rosa PT [id number] [facility]   2/26/2018 8:00 AM Teja De La Rosa PT [id number] [facility]   3/1/2018 8:00 AM Brynn Juarez PTA [id number] [facility]   3/1/2018 2:00 PM Ankit Cat MD [facility]   3/5/2018 8:00 AM Teja De La Rosa PT [id number] [facility]   3/8/2018 8:00 AM Brynn Juarez PTA [id number] [facility]

## 2018-02-26 ENCOUNTER — HOSPITAL ENCOUNTER (OUTPATIENT)
Dept: PHYSICAL THERAPY | Age: 68
Discharge: HOME | End: 2018-02-26
Payer: MEDICARE

## 2018-02-26 PROCEDURE — 97140 MANUAL THERAPY 1/> REGIONS: CPT

## 2018-02-26 PROCEDURE — 97110 THERAPEUTIC EXERCISES: CPT

## 2018-02-26 NOTE — PROGRESS NOTES
PT DAILY TREATMENT NOTE     Patient Name: Vira Cheek  Date:2018  : 1950  [x]  Patient  Verified  Payor: [employer]an[employer] / Plan: [facility] MED[place] PART A & B / Product Type: Medicare /    In time:800  Out time:847  Total Treatment Time (min): 47  Total Timed Codes (min): 37  1:1 Treatment Time ( [address] only): 23   Visit #: 10 of 12    Treatment Area: Left shoulder pain [M25.512]    SUBJECTIVE  Pain Level (0-10 scale): 1/10  Any medication changes, allergies to medications, adverse drug reactions, diagnosis change, or new procedure performed?: [x] No    [] Yes (see summary sheet for update)  Subjective functional status/changes:   [] No changes reported  Pt stated that he did not do anything crazy over the weekend    OBJECTIVE    Modality rationale: decrease inflammation and decrease pain to improve the patients ability to increase ease with ADLs   Min Type Additional Details    [] Estim:  []Unatt       []IFC  []Premod                        []Other:  []w/ice   []w/heat  Position:  Location:    [] Estim: []Att    []TENS instruct  []NMES                    []Other:  []w/US   []w/ice   []w/heat  Position:  Location:    []  Traction: [] Cervical       []Lumbar                       [] Prone          []Supine                       []Intermittent   []Continuous Lbs:  [] before manual  [] after manual    []  Ultrasound: []Continuous   [] Pulsed                           []1MHz   []3MHz W/cm2:  Location:    []  Iontophoresis with dexamethasone         Location: [] Take home patch   [] In clinic   10 [x]  Ice     []  heat  []  Ice massage  []  Laser   []  Anodyne Position: seated  Location:L sh    []  Laser with stim  []  Other:  Position:  Location:    []  Vasopneumatic Device Pressure:       [] lo [] med [] hi   Temperature: [] lo [] med [] hi   [x] Skin assessment post-treatment:  [x]intact []redness- no adverse reaction    []redness - adverse reaction:     29 min Therapeutic Exercise:  [x] See flow sheet : Rationale: increase ROM and increase strength to improve the patients ability to increase ease with aDLs    8 min Manual Therapy:  PROM with oscillations, clocks and scap mobs   Rationale: decrease pain and increase ROM to inceease ease with ADLs    With   [x] TE   [] TA   [] neuro   [] other: Patient Education: [x] Review HEP    [] Progressed/Changed HEP based on:   [] positioning   [] body mechanics   [] transfers   [] heat/ice application    [] other:      Other Objective/Functional Measures:   Pt cont with pain at end ranges with PROM  Has good scapular movement     Pain Level (0-10 scale) post treatment: 1/10    ASSESSMENT/Changes in Function:   Pt cont to slowly progress toward goals. Pt cont with decreased active and passive range of motion and cont with decreased strength in L shoulder    Patient will continue to benefit from skilled PT services to modify and progress therapeutic interventions, address functional mobility deficits, address ROM deficits and address strength deficits to attain remaining goals. []  See Plan of Care  [x]  See progress note/recertification  []  See Discharge Summary         Progress towards goals / Updated goals:  1. Patient will improve FOTO score by 38 points in order to demonstrate a significant improvement in function. 2. Patient will improve L shoulder AROM flex/scaption to 120 degrees in order to increase ease of ADLs. 3. Patient will improve behind back reaching to T12 in order to increase ease of getting dressed.      PLAN  []  Upgrade activities as tolerated     [x]  Continue plan of care  []  Update interventions per flow sheet       []  Discharge due to:_  []  Other:_      Ladan Savage PTA 2/26/2018  8:06 AM    Future Appointments  Date Time Provider Patrica Grider   3/1/2018 8:00 AM Ladan Savage PTA [id number]   3/1/2018 2:00 PM Paola Renee MD [facility]   3/5/2018 8:00 AM Raegan Irving PT [facility]   3/8/2018 8:00 AM Armendarizyousif Berman, PTA [facility]

## 2018-02-28 NOTE — PROGRESS NOTES
75 y.o. white male who presents for medical clearance    He successfully underwent left shoulder surgery by Dr Jason Basurto and is very pleased with the outcomes. Progressing well w the rehab    Reports no cardiovascular complaints, not allowed to bike yet so doing 3-4 mile walks every other day. No new GI or  issues, remains off ppi     The back flares up periodically and he's on the gabapentin    LAST MEDICARE WELLNESS EXAM: 2/5/15, 2/12/16, 2/23/17, 3/1/18   ACP 2/23/17, 3/1/18    Past Medical History:   Diagnosis Date    Arthritis     Dr. Katie Nevarez hips/knees/hands    BPH with obstruction/lower urinary tract symptoms 2/23/2017    Cervical radiculopathy 2012    Dr. Fani Parikh polyp     Dr. Robin Higgins 2009    Detrusor and sphincter dyssynergia     Dr. Jose Liz in past    Disc disease, degenerative, thoracic or thoracolumbar     MRI    Dyslipidemia     FHx: colon cancer     Frozen shoulder syndrome 2013    Dr. Stan Cartwright GERD (gastroesophageal reflux disease)     HSV (herpes simplex virus) infection     Syncope     Dr. Mary Davies 2009 neg holter and w/u     Past Surgical History:   Procedure Laterality Date    CHEST SURGERY PROCEDURE UNLISTED  7/14    CT chest neg for nodule    HX CATARACT REMOVAL  2012    Dr. Junior Higgins 2009 polyp; 2013?     HX ORTHOPAEDIC      right hand-tendon repair    HX ORTHOPAEDIC  6/14    shoulder surgery Dr Estelle Russo  12/2017    Dr Jason Basurto;  left shoulder arthroscopy, SAD, rotator cuff repair, distal clavicle resection, possible biceps tenodesis      Social History     Social History    Marital status:      Spouse name: N/A    Number of children: N/A    Years of education: N/A     Occupational History    ret , owns painting [employer]      Social History Main Topics    Smoking status: Never Smoker    Smokeless tobacco: Never Used    Alcohol use No    Drug use: No    Sexual activity: Not on file     Other Topics Concern    Not on file     Social History Narrative     Allergies   Allergen Reactions    Flomax [Tamsulosin] Vertigo    Uroxatral [Alfuzosin] Vertigo     And fainting      Current Outpatient Prescriptions   Medication Sig    multivitamin (ONE A DAY) tablet Take 1 Tab by mouth daily.  atorvastatin (LIPITOR) 10 mg tablet Take 1 Tab by mouth daily.  gabapentin (NEURONTIN) 600 mg tablet take 1 tablet by mouth three times a day    azithromycin (ZITHROMAX) 250 mg tablet Take 2 tablets today, then take 1 tablet daily    guaiFENesin-codeine (ROBITUSSIN AC) 100-10 mg/5 mL solution Take 5 mL by mouth three (3) times daily as needed for Cough. Max Daily Amount: 15 mL.  oxyCODONE-acetaminophen (PERCOCET 7.5) 7.5-325 mg per tablet Take 1 Tab by mouth every six (6) hours as needed for Pain. Max Daily Amount: 4 Tabs. No current facility-administered medications for this visit.       REVIEW OF SYSTEMS: colo 2013 Dr Alycia Barbour, sees Dr Sonia Duvall  Ophtho - no vision change or eye pain  Oral - no mouth pain, tongue or tooth problems  Ears - no hearing loss, ear pain, fullness, no swallowing problems  Cardiac - no CP, PND, orthopnea, edema, palpitations or syncope  Chest - no breast masses  Resp - no wheezing, chronic coughing, dyspnea  GI - no heartburn, nausea, vomiting, change in bowel habits, bleeding, hemorrhoids  Urinary - no dysuria, hematuria, flank pain, urgency, frequency  Genitals - no genital lesions, discharge, masses, ulceration, warts  Derm - no nail abnormalities, rashes, lesions of note, hair loss  Psych - denies any anxiety or depression symptoms, no hallucinations or violent ideation  Constitutional - no wt loss, night sweats, unexplained fevers  Neuro - no focal weakness, numbness, paresthesias, incoordination, ataxia, involuntary movements  Endo - no polyuria, polydipsia, nocturia, hot flashes    Visit Vitals    /62    Pulse 65    Temp 98.6 °F (37 °C) (Oral)    Resp 14    Ht 5' 7\" (1.702 m)    Wt 157 lb (71.2 kg)    SpO2 96%    BMI 24.59 kg/m2   Affect is appropriate. Mood stable  No apparent distress  HEENT --Anicteric sclerae, tympanic membranes normal,  ear canals normal.  PERRL, EOMI, conjunctiva and lids normal.  Disks were sharp  Sinuses were nontender, turbinates normal, hearing normal.  Oropharynx without  erythema, normal tongue, oral mucosa and tonsils. No thyromegaly, JVD, or bruits. Lungs --Clear to auscultation, normal percussion. Heart --Regular rate and rhythm, no murmurs, rubs, gallops, or clicks. Chest wall --Nontender to palpation. PMI normal.  Rectal showed guaiac neg brown stool normal tone  Prostate without asymmetry, nodularity, tenderness  Abdomen -- Soft and nontender, no hepatosplenomegaly or masses.   Ext without c/c/e    LABS  From 2/11 showed gluc 85, cr 0.80,             alt 23,                   chol 182, tg 123, hdl 37, ldl-c 120, wbc 4.5, hb 13.8, plt 257, psa 1.80       From 2/12 showed gluc 84, cr 0.70,             alt 19, ldl-p 1679                                                         wbc 4.0, hb 13.1, plt 266, psa 2.03  From 3/13 showed gluc 86, cr 0.80,             alt 19, ldl-p 1779, chol 171, tg 93,   hdl 42, ldl-c 110, tsh 0.85  From 9/13 showed                        ldl-p 1809, chol 192, tg 92,   hdl 49, ldl-c 125  From 1/14 showed gluc 84, cr 0.90, gfr>60, alt 41, ldl-p 1062, chol 131, tg 120, hdl 41, ldl-c 66,   tsh 0.85  From 6/14 showed gluc 83, cr 0.80, gfr>60,                   wbc 4.5, hb 14.0, plt 231  From 2/15 showed           chol 123, tg 79,   hdl 42, ldl-c 66,          psa 2.78  From 2/16 showed gluc 87, cr 0.85, gfr>60, alt 33,      chol 121, tg 103, hdl 34, ldl-c 66,   wbc 4.7, hb 14.2, plt 281, psa 3.20  From 2/17 showed gluc 85, cr 0.85, gfr>60, alt 32,       chol 127, tg 63,   hdl 54, ldl-c 60,   wbc 4.3, hb 14.9, plt 247, hep c neg  From 12/17 showed glu 96, cr 0.70, gfr>60,                   wbc 4.7, hb 14.1, plt 255    Results for orders placed or performed during the hospital encounter of 02/22/18   CBC W/O DIFF   Result Value Ref Range    WBC 3.9 (L) 4.6 - 13.2 K/uL    RBC 4.39 (L) 4.70 - 5.50 M/uL    HGB 13.3 13.0 - 16.0 g/dL    HCT 42.0 36.0 - 48.0 %    MCV 95.7 74.0 - 97.0 FL    MCH 30.3 24.0 - 34.0 PG    MCHC 31.7 31.0 - 37.0 g/dL    RDW 13.6 11.6 - 14.5 %    PLATELET 648 076 - 138 K/uL    MPV 10.1 9.2 - 11.8 FL   LIPID PANEL   Result Value Ref Range    LIPID PROFILE          Cholesterol, total 121 <200 MG/DL    Triglyceride 68 <150 MG/DL    HDL Cholesterol 46 40 - 60 MG/DL    LDL, calculated 61.4 0 - 100 MG/DL    VLDL, calculated 13.6 MG/DL    CHOL/HDL Ratio 2.6 0 - 5.0     METABOLIC PANEL, COMPREHENSIVE   Result Value Ref Range    Sodium 143 136 - 145 mmol/L    Potassium 4.1 3.5 - 5.5 mmol/L    Chloride 104 100 - 108 mmol/L    CO2 33 (H) 21 - 32 mmol/L    Anion gap 6 3.0 - 18 mmol/L    Glucose 79 74 - 99 mg/dL    BUN 11 7.0 - 18 MG/DL    Creatinine 0.83 0.6 - 1.3 MG/DL    BUN/Creatinine ratio 13 12 - 20      GFR est AA >60 >60 ml/min/1.73m2    GFR est non-AA >60 >60 ml/min/1.73m2    Calcium 8.7 8.5 - 10.1 MG/DL    Bilirubin, total 0.5 0.2 - 1.0 MG/DL    ALT (SGPT) 30 16 - 61 U/L    AST (SGOT) 23 15 - 37 U/L    Alk. phosphatase 54 45 - 117 U/L    Protein, total 6.7 6.4 - 8.2 g/dL    Albumin 4.0 3.4 - 5.0 g/dL    Globulin 2.7 2.0 - 4.0 g/dL    A-G Ratio 1.5 0.8 - 1.7       Patient Active Problem List   Diagnosis Code    Colon polyp Dr. Vandana Ramos  K63.5    Dyslipidemia E78.5    Arthritis, degenerative Dr Shaylee Del Toro M19.90    BPH with obstruction/lower urinary tract symptoms N40.1, N13.8     Assessment and plan:  1. GERD. Continue avoidance measures and otc meds  2. Dyslipidemia. Continue lipitor  3. Colon polyp and FH colon ca. Fiber, colo tp be scheduled Dr Jose Marin who has his records  4. BPH/LUTS. Declined meds, check psa today  5. Ortho.   Continue rehab and f/u Dr Shaylee Del Toro as directed        RTC 3/19    Above conditions discussed at length and patient vocalized understanding.   All questions answered to patient satifaction

## 2018-02-28 NOTE — PATIENT INSTRUCTIONS

## 2018-02-28 NOTE — ACP (ADVANCE CARE PLANNING)
Advance Care Planning    Advance Care Planning (ACP) Provider Note - Comprehensive     Date of ACP Conversation: 03/01/18  Persons included in Conversation:  patient  Length of ACP Conversation in minutes:  16 minutes    Authorized Decision Maker (if patient is incapable of making informed decisions): This person is: daughter Gearld Dural  Other Legally Authorized Decision Maker (e.g. Next of Kin)          General ACP for ALL Patients with Decision Making Capacity:   Importance of advance care planning, including choosing a healthcare agent to communicate patient's healthcare decisions if patient lost the ability to make decisions, such as after a sudden illness or accident  Understanding of the healthcare agent role was assessed and information provided  Exploration of values, goals, and preferences if recovery is not expected, even with continued medical treatment in the event of: Imminent death  Severe, permanent brain injury    Review of Existing Advance Directive:  na    For Serious or Chronic Illness:  Understanding of medical condition    Understanding of CPR, goals and expected outcomes, benefits and burdens discussed.     Interventions Provided:  Recommended completion of Advance Directive form after review of ACP materials and conversation with prospective healthcare agent   Recommended communicating the plan and making copies for the healthcare agent, personal physician, and others as appropriate (e.g., health system)  Recommended review of completed ACP document annually or upon change in health status c/o hypoglycemia with AMS. BGM at home 55, D50 given at scene, BGM at ED 95.

## 2018-02-28 NOTE — PROGRESS NOTES
This is a Subsequent Medicare Annual Wellness Visit     I have reviewed the patient's medical history in detail and updated the computerized patient record. History     Past Medical History:   Diagnosis Date    Arthritis     Dr. Joo Oliver hips/knees/hands    BPH with obstruction/lower urinary tract symptoms 2/23/2017    Cervical radiculopathy 2012    Dr. Janusz Byrd polyp     Dr. Vandana Ramos 2009    Detrusor and sphincter dyssynergia     Dr. Tita Nayak in past    Disc disease, degenerative, thoracic or thoracolumbar     MRI    Dyslipidemia     FHx: colon cancer     Frozen shoulder syndrome 2013    Dr. Isis Knowles GERD (gastroesophageal reflux disease)     HSV (herpes simplex virus) infection     Syncope     Dr. Mateus Pickett 2009 neg holter and w/u      Past Surgical History:   Procedure Laterality Date    CHEST SURGERY PROCEDURE UNLISTED  7/14    CT chest neg for nodule    HX CATARACT REMOVAL  2012    Dr. Margo Ramos 2009 polyp; 2013?  HX ORTHOPAEDIC      right hand-tendon repair    HX ORTHOPAEDIC  6/14    shoulder surgery Dr Saundra Cha ORTHOPAEDIC  12/2017    Dr Shaylee Del Toro;  left shoulder arthroscopy, SAD, rotator cuff repair, distal clavicle resection, possible biceps tenodesis      Current Outpatient Prescriptions   Medication Sig Dispense Refill    multivitamin (ONE A DAY) tablet Take 1 Tab by mouth daily.  atorvastatin (LIPITOR) 10 mg tablet Take 1 Tab by mouth daily. 90 Tab 3    gabapentin (NEURONTIN) 600 mg tablet take 1 tablet by mouth three times a day 270 Tab 3    azithromycin (ZITHROMAX) 250 mg tablet Take 2 tablets today, then take 1 tablet daily 6 Tab 0    guaiFENesin-codeine (ROBITUSSIN AC) 100-10 mg/5 mL solution Take 5 mL by mouth three (3) times daily as needed for Cough. Max Daily Amount: 15 mL. 120 mL 0    oxyCODONE-acetaminophen (PERCOCET 7.5) 7.5-325 mg per tablet Take 1 Tab by mouth every six (6) hours as needed for Pain.  Max Daily Amount: 4 Tabs. 30 Tab 0     Allergies   Allergen Reactions    Flomax [Tamsulosin] Vertigo    Uroxatral [Alfuzosin] Vertigo     And fainting     Family History   Problem Relation Age of Onset    Cancer Mother      colon    Cancer Sister      breast     Social History   Substance Use Topics    Smoking status: Never Smoker    Smokeless tobacco: Never Used    Alcohol use No     Depression Risk Factor Screening:     PHQ over the last two weeks 3/1/2018   Little interest or pleasure in doing things Not at all   Feeling down, depressed or hopeless Not at all   Total Score PHQ 2 0     SCREENINGS  Colonoscopy last done 2013 Dr Seng Oliveira  Prostate ODALYS/PSA done 3/18    Immunization History   Administered Date(s) Administered    Influenza High Dose Vaccine PF 10/15/2015, 11/14/2016, 10/03/2017    Influenza Vaccine 11/10/2014    Pneumococcal Conjugate (PCV-13) 02/11/2016    Pneumococcal Polysaccharide (PPSV-23) 02/05/2015    Td 01/01/2010    Tdap 02/19/2017    Zoster Vaccine, Live 01/01/2010     Alcohol Risk Factor Screening: On any occasion during the past 3 months, have you had more than 4 drinks containing alcohol? No    Do you average more than 14 drinks per week? No      Functional Ability and Level of Safety:     Hearing Loss   normal-to-mild    Vision - no acute complaints and is followed by Dr Shyla Burnett of [facility]   Self-care. Requires assistance with: no ADLs    Fall Risk     Fall Risk Assessment, last 12 mths 3/1/2018   Able to walk? Yes   Fall in past 12 months? No   Fall with injury? -   Number of falls in past 12 months -   Fall Risk Score -     Abuse Screen   Patient is not abused    Review of Systems   A comprehensive review of systems was negative except for that written in the HPI.     Physical Examination     Evaluation of Cognitive Function:  Mood/affect:  neutral  Appearance: age appropriate, well dressed and within normal Limits  Family member/caregiver input: na    Patient Care Team:  Bre Knox MD as PCP - General (Internal Medicine)  Sharonda Quiros RN as [address] (Internal Medicine)  Sagar Garner MD (Orthopedic Surgery)    Advice/Referrals/Counseling   Education and counseling provided:  Are appropriate based on today's review and evaluation  End-of-Life planning (with patient's consent)  Pneumococcal Vaccine  Influenza Vaccine  Prostate cancer screening tests (PSA, covered annually)  Colorectal cancer screening tests  Cardiovascular screening blood test  Diabetes screening test    Assessment/Plan       ICD-10-CM ICD-9-CM    1. Medicare annual wellness visit, subsequent Z00.00 V70.0    2. Advanced directives, counseling/discussion Z71.89 V65.49 ADVANCE CARE PLANNING FIRST 30 MINS   3. Screening for alcoholism Z13.89 V79.1 NE ANNUAL ALCOHOL SCREEN 15 MIN   4. Screening for depression Z13.89 V79.0 DEPRESSION SCREEN ANNUAL   5. Screen for colon cancer Z12.11 V76.51 REFERRAL TO GENERAL SURGERY   6. Screening for diabetes mellitus Z13.1 V77.1    7. Screening for ischemic heart disease Z13.6 V81.0    8. Special screening for malignant neoplasm of prostate Z12.5 V76.44 NE PROSTATE CA SCREENING; ODALYS      PSA, DIAGNOSTIC (PROSTATE SPECIFIC AG)   9. Screening PSA (prostate specific antigen) Z12.5 V76.44    10. BPH with obstruction/lower urinary tract symptoms N40.1 600.01 PSA, DIAGNOSTIC (PROSTATE SPECIFIC AG)    N13.8 599.69 PSA, DIAGNOSTIC (PROSTATE SPECIFIC AG)   11. Hyperplastic colonic polyp, unspecified part of colon K63.5 211.3 REFERRAL TO GENERAL SURGERY      CBC W/O DIFF      METABOLIC PANEL, COMPREHENSIVE   12. Dyslipidemia E78.5 272.4 LIPID PANEL   13. Primary osteoarthritis involving multiple joints M15.0 715.09      current treatment plan is effective, no change in therapy  lab results and schedule of future lab studies reviewed with patient  cardiovascular risk and specific lipid/LDL goals reviewed. End of life discussion undertaken.   He will work on getting medical directive in place  Colonoscopy to be scheduled per Dr Radha Saldivar

## 2018-03-01 ENCOUNTER — OFFICE VISIT (OUTPATIENT)
Dept: INTERNAL MEDICINE CLINIC | Age: 68
End: 2018-03-01

## 2018-03-01 ENCOUNTER — HOSPITAL ENCOUNTER (OUTPATIENT)
Dept: PHYSICAL THERAPY | Age: 68
Discharge: HOME | End: 2018-03-01
Payer: MEDICARE

## 2018-03-01 ENCOUNTER — HOSPITAL ENCOUNTER (OUTPATIENT)
Dept: LAB | Age: 68
Discharge: HOME | End: 2018-03-01
Payer: MEDICARE

## 2018-03-01 VITALS
DIASTOLIC BLOOD PRESSURE: 62 MMHG | RESPIRATION RATE: 14 BRPM | WEIGHT: 157 LBS | TEMPERATURE: 98.6 F | HEART RATE: 65 BPM | SYSTOLIC BLOOD PRESSURE: 110 MMHG | HEIGHT: 67 IN | OXYGEN SATURATION: 96 % | BODY MASS INDEX: 24.64 KG/M2

## 2018-03-01 DIAGNOSIS — K63.5 HYPERPLASTIC COLONIC POLYP, UNSPECIFIED PART OF COLON: ICD-10-CM

## 2018-03-01 DIAGNOSIS — Z12.5 SPECIAL SCREENING FOR MALIGNANT NEOPLASM OF PROSTATE: ICD-10-CM

## 2018-03-01 DIAGNOSIS — N13.8 BPH WITH OBSTRUCTION/LOWER URINARY TRACT SYMPTOMS: ICD-10-CM

## 2018-03-01 DIAGNOSIS — E78.5 DYSLIPIDEMIA: ICD-10-CM

## 2018-03-01 DIAGNOSIS — Z71.89 ADVANCED DIRECTIVES, COUNSELING/DISCUSSION: ICD-10-CM

## 2018-03-01 DIAGNOSIS — Z13.31 SCREENING FOR DEPRESSION: ICD-10-CM

## 2018-03-01 DIAGNOSIS — Z00.00 MEDICARE ANNUAL WELLNESS VISIT, SUBSEQUENT: Primary | ICD-10-CM

## 2018-03-01 DIAGNOSIS — N40.1 BPH WITH OBSTRUCTION/LOWER URINARY TRACT SYMPTOMS: ICD-10-CM

## 2018-03-01 DIAGNOSIS — Z13.6 SCREENING FOR ISCHEMIC HEART DISEASE: ICD-10-CM

## 2018-03-01 DIAGNOSIS — Z12.11 SCREEN FOR COLON CANCER: ICD-10-CM

## 2018-03-01 DIAGNOSIS — M15.9 PRIMARY OSTEOARTHRITIS INVOLVING MULTIPLE JOINTS: ICD-10-CM

## 2018-03-01 DIAGNOSIS — Z12.5 SCREENING PSA (PROSTATE SPECIFIC ANTIGEN): ICD-10-CM

## 2018-03-01 DIAGNOSIS — Z13.1 SCREENING FOR DIABETES MELLITUS: ICD-10-CM

## 2018-03-01 DIAGNOSIS — Z13.39 SCREENING FOR ALCOHOLISM: ICD-10-CM

## 2018-03-01 LAB — PSA SERPL-MCNC: 3.5 NG/ML (ref 0–4)

## 2018-03-01 PROCEDURE — 97110 THERAPEUTIC EXERCISES: CPT

## 2018-03-01 PROCEDURE — 84153 ASSAY OF PSA TOTAL: CPT | Performed by: INTERNAL MEDICINE

## 2018-03-01 PROCEDURE — 97140 MANUAL THERAPY 1/> REGIONS: CPT

## 2018-03-01 NOTE — PROGRESS NOTES
PT DAILY TREATMENT NOTE - [facility]     Patient Name: Lauren Calles  Date:3/1/2018  : 1950  [x]  Patient  Verified  Payor: [place] MEDICARE / Plan: VA MEDICARE PART A & B / Product Type: Medicare /    In time:800  Out time:844  Total Treatment Time (min): 44  Total Timed Codes (min): 34  1:1 Treatment Time ( only): 34   Visit #: 11 of 12    Treatment Area: Left shoulder pain [M25.512]    SUBJECTIVE  Pain Level (0-10 scale): 0/10  Any medication changes, allergies to medications, adverse drug reactions, diagnosis change, or new procedure performed?: [x] No    [] Yes (see summary sheet for update)  Subjective functional status/changes:   [] No changes reported  Pt stated that he is doing well today    OBJECTIVE    Modality rationale: decrease inflammation to improve the patients ability to increase ease with aDLs   Min Type Additional Details    [] Estim:  []Unatt       []IFC  []Premod                        []Other:  []w/ice   []w/heat  Position:  Location:    [] Estim: []Att    []TENS instruct  []NMES                    []Other:  []w/US   []w/ice   []w/heat  Position:  Location:    []  Traction: [] Cervical       []Lumbar                       [] Prone          []Supine                       []Intermittent   []Continuous Lbs:  [] before manual  [] after manual    []  Ultrasound: []Continuous   [] Pulsed                           []1MHz   []3MHz W/cm2:  Location:    []  Iontophoresis with dexamethasone         Location: [] Take home patch   [] In clinic   10 [x]  Ice     []  heat  []  Ice massage  []  Laser   []  Anodyne Position:seated  Location:left sh    []  Laser with stim  []  Other:  Position:  Location:    []  Vasopneumatic Device Pressure:       [] lo [] med [] hi   Temperature: [] lo [] med [] hi   [x] Skin assessment post-treatment:  [x]intact []redness- no adverse reaction    []redness - adverse reaction:     26 min Therapeutic Exercise:  [x] See flow sheet :   Rationale: increase ROM and increase strength to improve the patients ability to increase ease with aDLs    8 min Manual Therapy:  PROM with oscillations and GH mobs   Rationale: decrease pain, increase ROM and increase tissue extensibility to increase ease with aDLs    With   [x] TE   [] TA   [] neuro   [] other: Patient Education: [x] Review HEP    [] Progressed/Changed HEP based on:   [] positioning   [] body mechanics   [] transfers   [] heat/ice application    [] other:      Other Objective/Functional Measures:   Pt had better PROM today for flex and abd  No complaint of increased pain with exercises  Wall ladder was challenging  Pt cont to need cueing to use less pressure with isometrics     Pain Level (0-10 scale) post treatment: 0/10    ASSESSMENT/Changes in Function:   Pt is slowly progressing toward goals. PROM and strength are slowly improving. Pt reported that ADLs are becoming easier    Patient will continue to benefit from skilled PT services to modify and progress therapeutic interventions, address functional mobility deficits, address ROM deficits and address strength deficits to attain remaining goals. []  See Plan of Care  [x]  See progress note/recertification  []  See Discharge Summary         Progress towards goals / Updated goals:  1. Patient will improve FOTO score by 38 points in order to demonstrate a significant improvement in function. 2. Patient will improve L shoulder AROM flex/scaption to 120 degrees in order to increase ease of ADLs. 3. Patient will improve behind back reaching to T12 in order to increase ease of getting dressed.      PLAN  []  Upgrade activities as tolerated     [x]  Continue plan of care  []  Update interventions per flow sheet       []  Discharge due to:_  []  Other:_      Gregorio Alberto PTA 3/1/2018  8:00 AM    Future Appointments  Date Time Provider Patrica Grider   3/1/2018 2:00 PM Stephenie Fernandez MD [facility]   3/5/2018 2:30 PM Gregorio Alberto PTA MMCPTPB [facility] 3/8/2018 8:00 AM Mayra Kumari, PTA MMCPTPB [facility]   3/12/2018 7:30 AM Mayra Kumari, PTA [facility]CPTPB [facility]   3/15/2018 8:30 AM Ervin Mcgee, PT UGFPDHP [facility]   3/19/2018 8:00 AM Dhaval Donis, PT JCIVDRP [facility]   3/22/2018 8:30 AM Dhaval Donis, PT WRIOVHV [facility]   3/26/2018 8:30 AM Dhaval Donis, PT LINTWFN [facility]   3/29/2018 8:00 AM Ervin Mcgee, PT MMCPTPB [facility]

## 2018-03-01 NOTE — PROGRESS NOTES
1. Have you been to the ER, urgent care clinic or hospitalized since your last visit? YES Jan 11, 2018 at [facility]    2. Have you seen or consulted any other health care providers outside of the [address] [place] [address] since your last visit (Include any pap smears or colon screening)? NO      Do you have an Advanced Directive? NO    Would you like information on Advanced Directives?  NO

## 2018-03-01 NOTE — MR AVS SNAPSHOT
[address] [facility] 
 
 
 [address], [address] [facility] 
[phone] Patient: Adriana Nash MRN: [id number] :1950 Visit Information Date & Time Provider Department Dept. Phone Encounter #  
 3/1/2018  2:00 PM Jessica Pierce MD [facility] [phone] [id number] Your Appointments 2019  7:45 AM  
LAB with [facility] NURSE VISIT [facility] ([facility]) Appt Note: lab  
 [address], [address] [place] [address]  
  
   
 [address], [address]  
  
    
 3/5/2019  1:00 PM  
PHYSICAL with Jessica Pierce MD  
[facility]) Appt Note: [address]   
 [address] [facility], [address] [facility] [address]  
  
   
 [address], [address] Upcoming Health Maintenance Date Due COLONOSCOPY 2018 GLAUCOMA SCREENING Q2Y 2019 MEDICARE YEARLY EXAM 3/2/2019 DTaP/Tdap/Td series (2 - Td) 2027 Allergies as of 3/1/2018  Review Complete On: 3/1/2018 By: Ricarda Miller Severity Noted Reaction Type Reactions Flomax [Tamsulosin]  2011    Vertigo Uroxatral [Alfuzosin]  2012    Vertigo And fainting Current Immunizations  Reviewed on 2017 Name Date Influenza High Dose Vaccine PF 10/3/2017, 2016, 10/15/2015 Influenza Vaccine 11/10/2014 Pneumococcal Conjugate (PCV-13) 2016 Pneumococcal Polysaccharide (PPSV-23) 2015 Td 2010 Tdap 2017 10:50 AM  
 Zoster Vaccine, Live 2010 Not reviewed this visit You Were Diagnosed With   
  
 Codes Comments Medicare annual wellness visit, subsequent    -  Primary ICD-10-CM: Z00.00 ICD-9-CM: V70.0 Advanced directives, counseling/discussion     ICD-10-CM: Z71.89 ICD-9-CM: V65.49 Screening for alcoholism     ICD-10-CM: Z13.89 ICD-9-CM: V79.1 Screening for depression     ICD-10-CM: Z13.89 ICD-9-CM: V79.0 Screen for colon cancer     ICD-10-CM: Z12.11 ICD-9-CM: V76.51 Screening for diabetes mellitus     ICD-10-CM: Z13.1 ICD-9-CM: V77.1 Screening for ischemic heart disease     ICD-10-CM: Z13.6 ICD-9-CM: V81.0 Special screening for malignant neoplasm of prostate     ICD-10-CM: Z12.5 ICD-9-CM: V76.44 Vitals BP Pulse Temp Resp Height(growth percentile) Weight(growth percentile) 110/62 65 98.6 °F (37 °C) (Oral) 14 5' 7\" (1.702 m) 157 lb (71.2 kg) SpO2 BMI Smoking Status 96% 24.59 kg/m2 Never Smoker Vitals History BMI and BSA Data Body Mass Index Body Surface Area 24.59 kg/m 2 1.83 m 2 Preferred Pharmacy Pharmacy Name Phone [address] [place] [address] [phone] Your Updated Medication List  
  
   
This list is accurate as of 3/1/18  2:55 PM.  Always use your most recent med list.  
  
  
  
  
 atorvastatin 10 mg tablet Commonly known as:  LIPITOR Take 1 Tab by mouth daily. azithromycin 250 mg tablet Commonly known as:  Latrice Sick Take 2 tablets today, then take 1 tablet daily  
  
 gabapentin 600 mg tablet Commonly known as:  NEURONTIN  
take 1 tablet by mouth three times a day  
  
 guaiFENesin-codeine 100-10 mg/5 mL solution Commonly known as:  ROBITUSSIN AC Take 5 mL by mouth three (3) times daily as needed for Cough. Max Daily Amount: 15 mL.  
  
 multivitamin tablet Commonly known as:  ONE A DAY Take 1 Tab by mouth daily. oxyCODONE-acetaminophen 7.5-325 mg per tablet Commonly known as:  PERCOCET 7.5 Take 1 Tab by mouth every six (6) hours as needed for Pain. Max Daily Amount: 4 Tabs. We Performed the Following ADVANCE CARE PLANNING FIRST 30 MINS [11114 CPT(R)] Laura 67 [[id number] [facility]] [place] ANNUAL ALCOHOL SCREEN 15 MIN [id number] [facility]] [place] PROSTATE CA SCREENING; DOALYS [ [facility]] To-Do List   
 03/05/2018 2:30 PM  
  Appointment with Torri Hernandez PTA at [facility] [address] [facility] ([phone]) 03/08/2018 8:00 AM  
  Appointment with Torri Hernandez PTA at [facility] [address] [facility] ([phone])  
  
 03/12/2018 7:30 AM  
  Appointment with Torri Hernandez PTA at [facility] PT [address] [facility] ([phone])  
  
 03/15/2018 8:30 AM  
  Appointment with Shen Blake PT at [facility] PT [address] ([phone])  
  
 03/19/2018 8:00 AM  
  Appointment with Julio Peralta PT at [facility] PT [address] ([phone])  
  
 03/22/2018 8:30 AM  
  Appointment with Julio Peralta PT at [facility] PT [address] ([phone])  
  
 03/26/2018 8:30 AM  
  Appointment with Julio Peralta PT at [facility] PT [address] ([phone])  
  
 03/29/2018 8:00 AM  
  Appointment with Shen Blake PT at [address] [place] [address] ([phone]) Patient Instructions Medicare Wellness Visit, Male The best way to live healthy is to have a healthy lifestyle by eating a well-balanced diet, exercising regularly, limiting alcohol and stopping smoking. Regular physical exams and screening tests are another way to keep healthy. Preventive exams provided by your health care provider can find health problems before they become diseases or illnesses. Preventive services including immunizations, screening tests, monitoring and exams can help you take care of your own health. All people over age 72 should have a pneumovax  and and a prevnar shot to prevent pneumonia. These are once in a lifetime unless you and your provider decide differently. All people over 65 should have a yearly flu shot and a tetanus vaccine every 10 years. Screening for diabetes mellitus with a blood sugar test should be done every year. Glaucoma is a disease of the eye due to increased ocular pressure that can lead to blindness and it should be done every year by an eye professional. 
 
Cardiovascular screening tests that check for elevated lipids (fatty part of blood) which can lead to heart disease and strokes should be done every 5 years. Colorectal screening that evaluates for blood or polyps in your colon should be done yearly as a stool test or every five years as a flexible sigmoidoscope or every 10 years as a colonoscopy up to age 76. Men up to age 76 may need a screening blood test for prostate cancer at certain intervals, depending on their personal and family history. This decision is between the patient and his provider. If you have been a smoker or had family history of abdominal aortic aneurysms, you and your provider may decide to schedule an ultrasound test of your aorta. Hepatitis C screening is also recommended for anyone born between [birth year] through [address]. A shingles vaccine is also recommended once in a lifetime after age 61. Your Medicare Wellness Exam is recommended annually. Here is a list of your current Health Maintenance items with a due date: 
Health Maintenance Due Topic Date Due  
 Annual Well Visit  02/24/2018 Introducing [facility]! Dear Diana Jay: Thank you for requesting a Voxy account. Our records indicate that you already have an active Voxy account. You can access your account anytime at [email or web address] Did you know that you can access your hospital and ER discharge instructions at any time in Voxy? You can also review all of your test results from your hospital stay or ER visit. Additional Information If you have questions, please visit the Frequently Asked Questions section of the Voxy website at [email or web address] Remember, Voxy is NOT to be used for urgent needs. For medical emergencies, dial 911. Now available from your iPhone and Android! Please provide this summary of care documentation to your next provider. Your primary care clinician is listed as Elisa Maker. If you have any questions after today's visit, please call [phone].

## 2018-03-02 ENCOUNTER — TELEPHONE (OUTPATIENT)
Dept: INTERNAL MEDICINE CLINIC | Age: 68
End: 2018-03-02

## 2018-03-02 NOTE — TELEPHONE ENCOUNTER
The only lab drawn yesterday was for PSA.       The expected date for the others are   Expected             8/29/2018

## 2018-03-05 ENCOUNTER — HOSPITAL ENCOUNTER (OUTPATIENT)
Dept: PHYSICAL THERAPY | Age: 68
Discharge: HOME | End: 2018-03-05
Payer: MEDICARE

## 2018-03-05 PROCEDURE — 97110 THERAPEUTIC EXERCISES: CPT

## 2018-03-05 PROCEDURE — 97140 MANUAL THERAPY 1/> REGIONS: CPT

## 2018-03-05 NOTE — PROGRESS NOTES
PT DAILY TREATMENT NOTE - [facility]     Patient Name: Lauren Calles  Date:3/5/2018  : 1950  [x]  Patient  Verified  Payor: VA MEDICARE / Plan: VA MEDICARE PART A & B / Product Type: Medicare /    In time:230  Out time:300  Total Treatment Time (min): 30  Total Timed Codes (min): 30  1:1 Treatment Time ([facility] only): 30   Visit #: 12 of 12    Treatment Area: Left shoulder pain [M25.512]    SUBJECTIVE  Pain Level (0-10 scale): 1/10  Any medication changes, allergies to medications, adverse drug reactions, diagnosis change, or new procedure performed?: [x] No    [] Yes (see summary sheet for update)  Subjective functional status/changes:   [] No changes reported  Pt stated that he had elbow and wrist pain after last visit    OBJECTIVE    22 min Therapeutic Exercise:  [x] See flow sheet :   Rationale: increase ROM and increase strength to improve the patients ability to increase ease with ADLs    8 min Manual Therapy:  PROM with oscillations and GH mobs   Rationale: decrease pain, increase ROM and increase tissue extensibility to increase ease with aDLs    With   [x] TE   [] TA   [] neuro   [] other: Patient Education: [x] Review HEP    [] Progressed/Changed HEP based on:   [] positioning   [] body mechanics   [] transfers   [] heat/ice application    [] other:      Other Objective/Functional Measures:   Pt had slight increase in pain with PROM for abd and flex at end ranges  No complaint of increased pain with exercises  Had increased range for flex and abd with wall ladder   Pt declined CP    Pain Level (0-10 scale) post treatment: 1/10    ASSESSMENT/Changes in Function:   Pt is making slow progress toward goals.  Pt cont with decreased strength and range of motion in left shoulder and cont to be unable to reach behind his back    Patient will continue to benefit from skilled PT services to modify and progress therapeutic interventions, address functional mobility deficits, address ROM deficits and address strength deficits to attain remaining goals. []  See Plan of Care  [x]  See progress note/recertification  []  See Discharge Summary         Progress towards goals / Updated goals:  Goals for this certification period to be accomplished in 6 weeks:  1. Patient will improve FOTO score by 38 points in order to demonstrate a significant improvement in function. 2. Patient will improve L shoulder AROM flex/scaption to 120 degrees in order to increase ease of ADLs. 3. Patient will improve behind back reaching to T12 in order to increase ease of getting dressed.      PLAN  []  Upgrade activities as tolerated     [x]  Continue plan of care  []  Update interventions per flow sheet       []  Discharge due to:_  []  Other:_      Arielle Frederick PTA 3/5/2018  2:34 PM    Future Appointments  Date Time Provider Patrica Grider   3/8/2018 8:00 AM Arielle Frederick PTA MMCPTPB [address]   3/12/2018 7:30 AM Arielle Frederick PTA UNYJPIN [address]   3/15/2018 8:30 AM Ana Loo, PT UMCJTWS [address]   3/19/2018 8:00 AM Aldo Scruggs, PT MMCPTPB [address]   3/22/2018 8:30 AM Aldo Scruggs, PT MMCPTPB [address]   3/26/2018 8:30 AM [address] [facility]TP[facility] [address]   3/29/2018 8:00 AM Ana Loo, PT YKJLAWM [address]   2/26/2019 7:45 AM [facility] NURSE VISIT [facility] STEVE [facility]   3/5/2019 1:00 PM Cathi Harden MD [address]

## 2018-03-08 ENCOUNTER — HOSPITAL ENCOUNTER (OUTPATIENT)
Dept: PHYSICAL THERAPY | Age: 68
Discharge: HOME | End: 2018-03-08
Payer: MEDICARE

## 2018-03-08 PROCEDURE — 97110 THERAPEUTIC EXERCISES: CPT

## 2018-03-08 PROCEDURE — 97140 MANUAL THERAPY 1/> REGIONS: CPT

## 2018-03-08 NOTE — PROGRESS NOTES
PT DAILY TREATMENT NOTE     Patient Name: Kwadwo Vasquez  Date:3/8/2018  : 1950  [x]  Patient  Verified  Payor: VA MEDICARE / Plan: VA MEDICARE PART A & B / Product Type: Medicare /    In time:800  Out time:830  Total Treatment Time (min): 30  Visit #: 1 of 12    Treatment Area: Left shoulder pain [M25.512]    SUBJECTIVE  Pain Level (0-10 scale): 0.5/10  Any medication changes, allergies to medications, adverse drug reactions, diagnosis change, or new procedure performed?: [x] No    [] Yes (see summary sheet for update)  Subjective functional status/changes:   [] No changes reported  Pt stated that he is doing better today    OBJECTIVE    22 min Therapeutic Exercise:  [x] See flow sheet :   Rationale: increase ROM and increase strength to improve the patients ability to increase ease with ADLs    8 min Manual Therapy:  PROM with oscillations and scap mobs   Rationale: decrease pain, increase ROM and increase tissue extensibility to increase ease with ADLs    With   [x] TE   [] TA   [] neuro   [] other: Patient Education: [x] Review HEP    [] Progressed/Changed HEP based on:   [] positioning   [] body mechanics   [] transfers   [] heat/ice application    [] other:      Other Objective/Functional Measures:   PROM is improving for all motions  Cont with decreased strength in left shoulder  No complaint of increased pain with exercises    Pain Level (0-10 scale) post treatment: 0/10    ASSESSMENT/Changes in Function:   Pt is slowly progressing toward goals. PROM is slowly improving. Cont with decreased strength for all motions in the left sh. Patient will continue to benefit from skilled PT services to modify and progress therapeutic interventions, address functional mobility deficits, address ROM deficits and address strength deficits to attain remaining goals.      []  See Plan of Care  [x]  See progress note/recertification  []  See Discharge Summary         Progress towards goals / Updated goals: 1. Patient will improve FOTO score by 38 points in order to demonstrate a significant improvement in function. 2. Patient will improve L shoulder AROM flex/scaption to 120 degrees in order to increase ease of ADLs. 3. Patient will improve behind back reaching to T12 in order to increase ease of getting dressed.      PLAN  []  Upgrade activities as tolerated     [x]  Continue plan of care  []  Update interventions per flow sheet       []  Discharge due to:_  []  Other:_      Steffi Mckeon PTA 3/8/2018  8:00 AM    Future Appointments  Date Time Provider Patrica Grider   3/12/2018 7:30 AM Steffi Mckeon PTA MMCPTPB [facility]   3/15/2018 8:30 AM Rena Ledezma, PT HWTQUZY [facility]   3/19/2018 8:00 AM Agustín Jimenez, PT MMCPTPB [facility]   3/22/2018 8:30 AM Agustín Jimenez, PT MMCPTPB [facility]   3/26/2018 8:30 AM [facility] PT [address] MMCPTPB [facility]   3/29/2018 8:00 AM Rena Ledezma, PT SKYRROG [facility]   2/26/2019 7:45 AM IO NURSE VISIT [facility] STEVE JAMES   3/5/2019 1:00 PM Mango Torres MD [facility]

## 2018-03-12 ENCOUNTER — HOSPITAL ENCOUNTER (OUTPATIENT)
Dept: PHYSICAL THERAPY | Age: 68
Discharge: HOME | End: 2018-03-12
Payer: MEDICARE

## 2018-03-12 PROCEDURE — 97140 MANUAL THERAPY 1/> REGIONS: CPT

## 2018-03-12 PROCEDURE — 97110 THERAPEUTIC EXERCISES: CPT

## 2018-03-12 RX ORDER — GABAPENTIN 600 MG/1
TABLET ORAL
Qty: 270 TAB | Refills: 3 | Status: SHIPPED | OUTPATIENT
Start: 2018-03-12 | End: 2019-04-07 | Stop reason: SDUPTHER

## 2018-03-12 RX ORDER — GABAPENTIN 600 MG/1
TABLET ORAL
Qty: 270 TAB | Refills: 3 | Status: SHIPPED | OUTPATIENT
Start: 2018-03-12 | End: 2019-03-11 | Stop reason: SDUPTHER

## 2018-03-12 NOTE — PROGRESS NOTES
PT DAILY TREATMENT NOTE - [facility]     Patient Name: Jon Lutz  Date:3/12/2018  : 1950  [x]  Patient  Verified  Payor: VA MEDICARE / Plan: VA MEDICARE PART A & B / Product Type: Medicare /    In time:730  Out time:802  Total Treatment Time (min): 32  Total Timed Codes (min): 32  1:1 Treatment Time ( only): 25   Visit #: 2 of 12    Treatment Area: Left shoulder pain [M25.512]    SUBJECTIVE  Pain Level (0-10 scale): 1/10  Any medication changes, allergies to medications, adverse drug reactions, diagnosis change, or new procedure performed?: [x] No    [] Yes (see summary sheet for update)  Subjective functional status/changes:   [] No changes reported  Pt stated that he is doing well today    OBJECTIVE    24 min Therapeutic Exercise:  [x] See flow sheet :   Rationale: increase ROM and increase strength to improve the patients ability to increase ease with ADLs    8 min Manual Therapy:  PROM with oscillations   Rationale: decrease pain, increase ROM and increase tissue extensibility to increase ease with ADLs    With   [x] TE   [] TA   [] neuro   [] other: Patient Education: [x] Review HEP    [] Progressed/Changed HEP based on:   [] positioning   [] body mechanics   [] transfers   [] heat/ice application    [] other:      Other Objective/Functional Measures:   Pt had no complaint of increased pain during session  PROM is improving for all motions  Strength in slowly improving     Pain Level (0-10 scale) post treatment: 1/10    ASSESSMENT/Changes in Function:   Pt cont to progress well toward goals. PROM and AAROM is slowly improving. Pt cont to have difficulty with reaching behind his back. Patient will continue to benefit from skilled PT services to modify and progress therapeutic interventions, address functional mobility deficits, address ROM deficits and address strength deficits to attain remaining goals.      []  See Plan of Care  [x]  See progress note/recertification  []  See Discharge Summary         Progress towards goals / Updated goals:  1. Patient will improve FOTO score by 38 points in order to demonstrate a significant improvement in function. 2. Patient will improve L shoulder AROM flex/scaption to 120 degrees in order to increase ease of ADLs. 3. Patient will improve behind back reaching to T12 in order to increase ease of getting dressed.      PLAN  []  Upgrade activities as tolerated     [x]  Continue plan of care  []  Update interventions per flow sheet       []  Discharge due to:_  []  Other:_      Bradley Yip PTA 3/12/2018  7:34 AM    Future Appointments  Date Time Provider Patrica Grider   3/15/2018 8:30 AM Win Burnett, PT MMCPTPB [facility]   3/19/2018 8:00 AM Alice Khanna, PT MMCPTPB [facility]   3/22/2018 8:30 AM Alice Khanna, PT MMCPTPB [facility]   3/26/2018 8:30 AM [facility] [address] [facility]   3/29/2018 8:00 AM Win Burnett, PT NEWAKKL [facility]   2/26/2019 7:45 AM [facility] NURSE VISIT [facility] STEVE [facility]   3/5/2019 1:00 PM Matilda Lester MD [address]

## 2018-03-12 NOTE — TELEPHONE ENCOUNTER
From: Adriana Nash  To: Jessica Pierce MD  Sent: 3/12/2018 12:29 PM EDT  Subject: Medication Renewal Request    Original authorizing provider: Jessica Pierce MD    [facility].  Dena House would like a refill of the following medications:  gabapentin (NEURONTIN) 600 mg tablet Jessica Pierce MD]    Preferred pharmacy: [address] [place] [address] Ave:

## 2018-03-15 ENCOUNTER — HOSPITAL ENCOUNTER (OUTPATIENT)
Dept: PHYSICAL THERAPY | Age: 68
Discharge: HOME | End: 2018-03-15
Payer: MEDICARE

## 2018-03-15 PROCEDURE — 97110 THERAPEUTIC EXERCISES: CPT

## 2018-03-15 PROCEDURE — 97140 MANUAL THERAPY 1/> REGIONS: CPT

## 2018-03-15 NOTE — PROGRESS NOTES
PT DAILY TREATMENT NOTE - [facility]     Patient Name: Mingo Baca  Date:3/15/2018  : 1950  [x]  Patient  Verified  Payor: [place] MEDICARE / Plan: [place] MEDICARE PART A & B / Product Type: Medicare /    In time:8:30  Out time:9:01  Total Treatment Time (min): 31  Total Timed Codes (min): 31  1:1 Treatment Time ( only): 26   Visit #: 3 of 12    Treatment Area: Left shoulder pain [M25.512]    SUBJECTIVE  Pain Level (0-10 scale): 1/10  Any medication changes, allergies to medications, adverse drug reactions, diagnosis change, or new procedure performed?: [x] No    [] Yes (see summary sheet for update)  Subjective functional status/changes:   [] No changes reported  Pt reports that his shoulder is doing better and he still has some pain now and then. His greatest complaint is his neck and the aggravation from his shoulder to his neck. He is using his shoulder at home for tasks below 90 dgrs.   He lifted a bag of fertilizer a couple days ago and he could feel it but the pain subsided with rest.      OBJECTIVE      23 min Therapeutic Exercise:  [x] See flow sheet :   Rationale: increase ROM and increase strength to improve the patients ability to improve ease of ADLs    8 min Manual Therapy:  PROM flexion/abduction/ER with oscillations, grade 2-3 posterior/inferior GHJ mobs, shoulder clocks    Rationale: decrease pain, increase ROM and increase tissue extensibility to improve ease of futrue reaching             With   [] TE   [] TA   [] neuro   [] other: Patient Education: [x] Review HEP    [] Progressed/Changed HEP based on:   [] positioning   [] body mechanics   [] transfers   [] heat/ice application    [] other:      Other Objective/Functional Measures:     Shoulder AROM  Left flexion 134 dgrs, Right 140 dgrs   Right Abd 111 dgrs   Right Scaption 125 dgrs   Right Functional IR L4-L5      PROM ER 30 dgrs     All PROM performed unforced  Pain with end-range PROM ER and abduction, pain subsided immediately when removed from end-range  Hypomobility GHJ posteriorly and inferiorly, addressed manually    Correct form with TB rows with cuing, no pain/difficulty   Premature and excessive upward rotation of left scapula with shoulder elevation     Pain Level (0-10 scale) post treatment: 0/10    ASSESSMENT/Changes in Function:     Pt is making slow, steady progress towards therapy goals. PROM is slowly improving but continues to be limited, with abduction and ER limited by empty end-feel, however, capsular restrictions are evident with PROM and mobs. Initiated TB rows to address scapular dyskinesis. Will progress isometric and AAROM interventions per flow sheet as tolerated next session. Will continue to address strength, flexibility, ROM, and joint mobility deficits per protocol for return to PLOF. Patient will continue to benefit from skilled PT services to modify and progress therapeutic interventions, address ROM deficits, address strength deficits, analyze and address soft tissue restrictions, analyze and cue movement patterns, assess and modify postural abnormalities and instruct in home and community integration to attain remaining goals. Progress towards goals / Updated goals:  1. Patient will improve FOTO score by 38 points in order to demonstrate a significant improvement in function. 2. Patient will improve L shoulder AROM flex/scaption to 120 degrees in order to increase ease of ADLs. Progression. AROM Flexion 134 dgrs, Scaption 125 dgrs, Abd 110 3/15/18  3. Patient will improve behind back reaching to T12 in order to increase ease of getting dressed. Progressing.   L4-5 3/15/18    PLAN  []  Upgrade activities as tolerated     [x]  Continue plan of care  []  Update interventions per flow sheet       []  Discharge due to:_  []  Other:_      Elver Tong PT 3/15/2018  8:37 AM    Future Appointments  Date Time Provider Patrica Grider   3/19/2018 8:00 AM Kali Block PT [id number] [facility] 3/22/2018 8:30 AM Raegan Irving, PT PTNDVSE [facility]   3/26/2018 8:30 AM [facility] PT [address] MMCPTPB [facility]   3/29/2018 8:00 AM Kierra White, PT [id number] [facility]   2/26/2019 7:45 AM [facility] NURSE VISIT [facility]   3/5/2019 1:00 PM Paola Renee MD [facility]

## 2018-03-19 ENCOUNTER — HOSPITAL ENCOUNTER (OUTPATIENT)
Dept: PHYSICAL THERAPY | Age: 68
Discharge: HOME | End: 2018-03-19
Payer: MEDICARE

## 2018-03-19 PROCEDURE — 97110 THERAPEUTIC EXERCISES: CPT

## 2018-03-19 PROCEDURE — 97140 MANUAL THERAPY 1/> REGIONS: CPT

## 2018-03-19 PROCEDURE — G8985 CARRY GOAL STATUS: HCPCS

## 2018-03-19 PROCEDURE — G8984 CARRY CURRENT STATUS: HCPCS

## 2018-03-19 NOTE — PROGRESS NOTES
[facility] [employer]  [facility]  [address] [place] [address]  [phone] fax    Medicare Progress Report    Patient name: Lauren Calles Start of Care:  18   Referral source: Jesus Cazares MD : 1950   Medical/Treatment Diagnosis: Left shoulder pain [M25.512] Onset Date: 18     Prior Hospitalization: see medical history Provider#: [id number]   Medications: Verified on Patient Summary List    Comorbidities:  arthritis, history or R rotator cuff surgery in 2014 and clavicle fracture in 2017   Prior Level of Function: Ind with ADLs, driving, bike riding   Visits from Start of Care: 16    Missed Visits: 0    Reporting Period: 18 to  3/19/18    Subjective Reports:  Pt. Reports     Current Status/ treatment goals Objective measures   1. Patient will improve FOTO score by 38 points in order to demonstrate a significant improvement in function. [] met                 [] not met  [x] progressing  last note: 41    Current: 59   2. Patient will improve L shoulder AROM flex/scaption to 120 degrees in order to increase ease of ADLs. [] met                 [] not met  [x] progressing Last note: unable    Current: flex: 134, scap 125 abd: 110 degrees with mild hiking   3. Patient will improve behind back reaching to T12 in order to increase ease of getting dressed. [] met                 [] not met  [x] progressing Last note:  Unable     Current: L4-5          Key functional changes: improving left shoulder AROM      Problems/ barriers to goal attainment: none     Assessment / Recommendations: pt. Is progressing slowly towards goals. He continues to have shoulder hiking during elevation above 90 degrees and demonstrates decreased mobility with reaching behind back. FOTO scores are making progress at 59 points. He also continues to have significant decrease in left shoulder mobility.  Skilled PT is medically necessary in order to increase ease of ADLs and return to PLOF. Problem List: pain affecting function, decrease ROM, decrease strength, impaired gait/ balance, decrease ADL/ functional abilitiies, decrease activity tolerance, decrease flexibility/ joint mobility and decrease transfer abilities   Treatment Plan: Therapeutic exercise, Therapeutic activities, Neuromuscular re-education, Physical agent/modality, Gait/balance training, Manual therapy, Patient education, Self Care training and Functional mobility training    Patient Goal (s) has been updated and includes: to get shoulder back to normal     Updated Goals to be accomplished in 8 treatments:  1. Patient will improve FOTO score by 38 points in order to demonstrate a significant improvement in function. 2. Patient will improve L shoulder AROM flex/scaption to 120 degrees in order to increase ease of ADLs. 3. Patient will improve behind back reaching to T12 in order to increase ease of getting dressed. Frequency / Duration: Patient to be seen 2 times per week for 8 treatments    G-Codes (GP)  Carry   Current  CK= 40-59%    Goal  CJ= 20-39%    The severity rating is based on clinical judgment and the FOTO score.       Henrique Oneill, PT 3/19/2018 9:30 AM

## 2018-03-19 NOTE — PROGRESS NOTES
PT DAILY TREATMENT NOTE - [facility]     Patient Name: Frankie Willis  Date:3/19/2018  : 1950  [x]  Patient  Verified  Payor: Allie Minor / Plan: [place] MEDICARE PART A & B / Product Type: Medicare /    In time: 8:00  Out time: 8:34  Total Treatment Time (min):  34  Total Timed Codes (min):  34  1:1 Treatment Time ( [address] only): 23   Visit #: 4 of 12    Treatment Area: Left shoulder pain [M25.512]    SUBJECTIVE  Pain Level (0-10 scale): 0.5/10  Any medication changes, allergies to medications, adverse drug reactions, diagnosis change, or new procedure performed?: [x] No    [] Yes (see summary sheet for update)  Subjective functional status/changes:   [] No changes reported  Pt. Reports she has been doing pretty good. He reports he continues to feel stiff. OBJECTIVE    26 min Therapeutic Exercise:  [x] See flow sheet :   Rationale: increase ROM and increase strength to improve the patients ability to increase ease of ADLs    8 min Manual Therapy:  GH post/inf mobs   Rationale: decrease pain, increase ROM and increase tissue extensibility to increase ease of ADLs          With   [x] TE   [] TA   [] neuro   [] other: Patient Education: [x] Review HEP    [] Progressed/Changed HEP based on:   [] positioning   [] body mechanics   [] transfers   [] heat/ice application    [] other:      Other Objective/Functional Measures: FOTO: 61 points  He was educated on updated HEP  Pt.  Has firm end feels in all shoulder directions during PROM     Pain Level (0-10 scale) post treatment: 1/10    ASSESSMENT/Changes in Function:      []  See Plan of Care  [x]  See progress note/recertification  []  See Discharge Summary         Progress towards goals / Updated goals:  See progress note    PLAN  []  Upgrade activities as tolerated     [x]  Continue plan of care  []  Update interventions per flow sheet       []  Discharge due to:_  []  Other:_      Kody Schaffer, PT 3/19/2018  7:56 AM    Future Appointments  Date Time Provider Patrica Grider   3/19/2018 8:00 AM Ilda Alicea, PT [id number] [facility]   3/22/2018 8:30 AM Ilda Alicea, PT [id number] [facility]   3/26/2018 8:30 AM [facility] PT [address] MMCPTPB [facility]   3/29/2018 8:00 AM Heather Candelaria, PT [id number] [facility]   2/26/2019 7:45 AM IOC NURSE VISIT [facility]   3/5/2019 1:00 PM Courtney Tsai MD [facility]

## 2018-03-22 ENCOUNTER — HOSPITAL ENCOUNTER (OUTPATIENT)
Dept: PHYSICAL THERAPY | Age: 68
Discharge: HOME | End: 2018-03-22
Payer: MEDICARE

## 2018-03-22 PROCEDURE — 97140 MANUAL THERAPY 1/> REGIONS: CPT

## 2018-03-22 PROCEDURE — 97110 THERAPEUTIC EXERCISES: CPT

## 2018-03-22 NOTE — PROGRESS NOTES
PT DAILY TREATMENT NOTE - [facility]     Patient Name: Baron Menon  Date:3/22/2018  : 1950  [x]  Patient  Verified  Payor: [place] MEDICARE / Plan: [place] MEDICARE PART A & B / Product Type: Medicare /    In time:8:30  Out time:9:04  Total Treatment Time (min): 34  Total Timed Codes (min): 34  1:1 Treatment Time ( [address] only): 24   Visit #: 5 of 12    Treatment Area: Left shoulder pain [M25.512]    SUBJECTIVE  Pain Level (0-10 scale): 0/10  Any medication changes, allergies to medications, adverse drug reactions, diagnosis change, or new procedure performed?: [x] No    [] Yes (see summary sheet for update)  Subjective functional status/changes:   [] No changes reported  Pt reports that his shoulder is aching a little today because he slept on it wrong last night. He keeps rolling over onto his shoulder when he's sleeping. He has been doing his exercises at home. OBJECTIVE    26 min Therapeutic Exercise:  [x] See flow sheet :   Rationale: increase ROM and increase strength to improve the patients ability to improve ease of reaching with ADLs    8 min Manual Therapy:  GHJ grade 3 posterior/inferior mobs, PROM flexion, abduction, and ER     Rationale: decrease pain, increase ROM and increase tissue extensibility to improve ease of reaching with ADLs            With   [] TE   [] TA   [] neuro   [] other: Patient Education: [x] Review HEP    [] Progressed/Changed HEP based on:   [] positioning   [] body mechanics   [] transfers   [] heat/ice application    [] other:      Other Objective/Functional Measures:     PROM ER 50 dgrs following manual with firm end feel (30 dgrs last assessed)   Improving GHJ mobility but continues to be decreased   Sharp pain with last rep of supine wand ER, pt reported he pushed too far.   Pain subsided within 5 second rest break     No pain with shoulder flexion isometric this visit, pt educated on submax and avoiding pain with isometrics     Pain Level (0-10 scale) post treatment: 0/10    ASSESSMENT/Changes in Function:     Pt is making steady progress towards therapy goals. ROM and capsular mobility is steadily improving. Will continue to address strength, ROM, and flexibility deficits for improved functional use of left UE and return to PLOF. Patient will continue to benefit from skilled PT services to modify and progress therapeutic interventions, address ROM deficits, address strength deficits, analyze and address soft tissue restrictions, analyze and cue movement patterns, assess and modify postural abnormalities and instruct in home and community integration to attain remaining goals. Progress towards goals / Updated goals:  1. Patient will improve FOTO score by 38 points in order to demonstrate a significant improvement in function. 2. Patient will improve L shoulder AROM flex/scaption to 120 degrees in order to increase ease of ADLs. 3. Patient will improve behind back reaching to T12 in order to increase ease of getting dressed.      PLAN  []  Upgrade activities as tolerated     [x]  Continue plan of care  []  Update interventions per flow sheet       []  Discharge due to:_  []  Other:_      Mone Hamilton PT 3/22/2018  7:43 AM    Future Appointments  Date Time Provider Patrica Grider   3/22/2018 8:30 AM Mone Hamilton PT [id number] [facility]   3/26/2018 8:30 AM [facility] [address] [facility] [address] MMCPTP[facility]   3/29/2018 8:00 AM Mone Hamilton PT [id number] [facility]   2/26/2019 7:45 AM IO NURSE VISIT [facility]EVE JAMES   3/5/2019 1:00 PM Pantera Freitas MD [facility]

## 2018-03-26 ENCOUNTER — HOSPITAL ENCOUNTER (OUTPATIENT)
Dept: PHYSICAL THERAPY | Age: 68
Discharge: HOME | End: 2018-03-26
Payer: MEDICARE

## 2018-03-26 PROCEDURE — 97140 MANUAL THERAPY 1/> REGIONS: CPT

## 2018-03-26 PROCEDURE — 97110 THERAPEUTIC EXERCISES: CPT

## 2018-03-29 ENCOUNTER — HOSPITAL ENCOUNTER (OUTPATIENT)
Dept: PHYSICAL THERAPY | Age: 68
Discharge: HOME | End: 2018-03-29
Payer: MEDICARE

## 2018-03-29 PROCEDURE — 97112 NEUROMUSCULAR REEDUCATION: CPT

## 2018-03-29 PROCEDURE — 97110 THERAPEUTIC EXERCISES: CPT

## 2018-03-29 PROCEDURE — 97140 MANUAL THERAPY 1/> REGIONS: CPT

## 2018-03-29 NOTE — PROGRESS NOTES
PT DAILY TREATMENT NOTE - [facility]     Patient Name: Felisa Cheadle  Date:3/29/2018  : 1950  [x]  Patient  Verified  Payor: Kelly Rodriguez / Plan: [place] MEDICARE PART A & B / Product Type: Medicare /    In time: 8:00  Out time: 8:45   Total Treatment Time (min): 45  Total Timed Codes (min): 45  1:1 Treatment Time ( [address] only): 39  Visit #: 7 of 12    Treatment Area: Left shoulder pain [M25.512]    SUBJECTIVE  Pain Level (0-10 scale): 0/10  Any medication changes, allergies to medications, adverse drug reactions, diagnosis change, or new procedure performed?: [x] No    [] Yes (see summary sheet for update)  Subjective functional status/changes:   [] No changes reported  Pt reports no current pain just some tightness in the top of his shoulder when he is working on raising it. He states he is doing his shoulder iso exercises 15-20 times each up to 2 times a day. 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 He also wants to be able to do yard work like Franco Thomason.      OBJECTIVE    25 min Therapeutic Exercise:  [x] See flow sheet :   Rationale: increase ROM and increase strength to improve the patients ability to improve ease of OH reaching per protocol for return to ADLs     10 min Neuromuscular Re-education:  [x]  See flow sheet : mirror and tactile cues with back to wall working on activation of pecs, biceps and deltoids versus UT hiking for pure scapulohumeral motion   Rationale: increase ROM, increase strength, improve coordination, improve balance and increase proprioception  to improve the patients ability to improve scapulohumeral rhythm without UT compensations for improved OH reaching    10 min Manual Therapy:  AC joint mobs grade III; GH posterior/inferior mobs grade III, scap upward rotation mobs; GH stretching with scapula held down   Rationale: decrease pain, increase ROM and increase tissue extensibility to improve ease of OH reaching without UT compensation          With   [] TE   [] TA   [] neuro   [] other: Patient Education: [x] Review HEP    [] Progressed/Changed HEP based on:   [] positioning   [] body mechanics   [] transfers   [] heat/ice application    [] other:      Other Objective/Functional Measures:   Decreased upward scap rotation  Capsular limitations with firm end feel for flexion  Tightness of lats, teres and subscapularis  Challenged initially with eccentric wall slides; cues to not engaged UT; hiking occurs ~85-90 degrees range but improves with tactile cueing of pecs for lifting  Added prone single arm off table T, W with tactile cues for scap activation and slight depression to prevent UT hike  Educated immensely on posture importance for shoulder health due to habitual forward posture  Added passive towel IR for gentle stretching  Decreased pain with post capsule stretch once pt depressed shoulder     Pain Level (0-10 scale) post treatment: 0/10    ASSESSMENT/Changes in Function: Pt making steady progress towards goals with some capsular restrictions at end range which may have been present prior to the surgery. He has decreased pain overall and slowly improving strength per protocol restrictions. He continues to have forward shoulder posture requiring cues for correct neuromuscular activation. Will continue working to strengthen posterior shoulder girdle and improve OH reaching for eventual return to yard work and painting once allowed per MD and protocol. Patient will continue to benefit from skilled PT services to modify and progress therapeutic interventions, address functional mobility deficits, address ROM deficits, address strength deficits, analyze and address soft tissue restrictions, analyze and cue movement patterns, analyze and modify body mechanics/ergonomics, assess and modify postural abnormalities and instruct in home and community integration to attain remaining goals. Progress towards goals / Updated goals:  1. Patient will improve FOTO score by 38 points in order to demonstrate a significant improvement in function. 2. Patient will improve L shoulder AROM flex/scaption to 120 degrees in order to increase ease of ADLs.    3. Patient will improve behind back reaching to T12 in order to increase ease of getting dressed.        PLAN  [x]  Upgrade activities as tolerated     [x]  Continue plan of care  []  Update interventions per flow sheet       []  Discharge due to:_  []  Other:_      Len Bradford PTA 3/29/2018  8:00 AM    Future Appointments  Date Time Provider Patrica Grider   2/26/2019 7:45 AM [facility] NURSE VISIT Precious Frank   3/5/2019 1:00 PM Merlinda Posner, MD [address]

## 2018-04-03 ENCOUNTER — HOSPITAL ENCOUNTER (OUTPATIENT)
Dept: PHYSICAL THERAPY | Age: 68
Discharge: HOME | End: 2018-04-03
Payer: MEDICARE

## 2018-04-03 PROCEDURE — 97110 THERAPEUTIC EXERCISES: CPT

## 2018-04-03 PROCEDURE — 97140 MANUAL THERAPY 1/> REGIONS: CPT

## 2018-04-03 NOTE — PROGRESS NOTES
PT DAILY TREATMENT NOTE - [facility]     Patient Name: Raffy Green  Date:4/3/2018  : 1950  [x]  Patient  Verified  Payor: [place] MEDICARE / Plan: VA MEDICARE PART A & B / Product Type: Medicare /    In time:9:30  Out time:9:59  Total Treatment Time (min): 29  Total Timed Codes (min): 29  1:1 Treatment Time ( only): 25  Visit #: 8 of 12    Treatment Area: Left shoulder pain [M25.512]    SUBJECTIVE  Pain Level (0-10 scale): 0/10  Any medication changes, allergies to medications, adverse drug reactions, diagnosis change, or new procedure performed?: [x] No    [] Yes (see summary sheet for update)  Subjective functional status/changes:   [] No changes reported  Pt reports that he has been much more aware of his posture since last session. He has working on moving his shoulder correctly, and it has been feeling better. He did some light weed-whacking yesterday. Next MD 18.     OBJECTIVE      21 min Therapeutic Exercise:  [x] See flow sheet :   Rationale: increase ROM and increase strength to improve the patients ability to improve ease of reaching with housework     8 min Manual Therapy:  Grade 3-4 posterior/inferior GHJ mobs, PROM flexion/abd/ER with oscillations    Rationale: decrease pain, increase ROM and increase tissue extensibility to improve ease of reaching with daily tasks and improve scapulohumeral rhythm for reduced risk of orthopedic injury             With   [] TE   [] TA   [] neuro   [] other: Patient Education: [x] Review HEP    [] Progressed/Changed HEP based on:   [] positioning   [] body mechanics   [] transfers   [] heat/ice application    [] other:      Other Objective/Functional Measures:    ER PROM 66  dgrs (40 dgrs last assessed)  All PROM unforced with firm end-feel all directions  Challenged with prone Y,W   Continues to demonstrate increased upward rotation of left scapula with shoulder elevation       Pain Level (0-10 scale) post treatment: 0/10    ASSESSMENT/Changes in Function:     Pt tolerated therapy without complaint of increased sx and is making steady progress towards therapy goals. He continues to demonstrate minor GHJ capsular restrictions with increased scapula upward rotation to compensate; however, scapulohumeral rhythm is steadily improving. Will continue to address strength , ROM, and flexibility deficits per protocol/pt tolerance. Patient will continue to benefit from skilled PT services to modify and progress therapeutic interventions, address ROM deficits, address strength deficits, analyze and address soft tissue restrictions, analyze and cue movement patterns, assess and modify postural abnormalities and instruct in home and community integration to attain remaining goals. Progress towards goals / Updated goals:  1. Patient will improve FOTO score by 38 points in order to demonstrate a significant improvement in function. 2. Patient will improve L shoulder AROM flex/scaption to 120 degrees in order to increase ease of ADLs. 3. Patient will improve behind back reaching to T12 in order to increase ease of getting dressed.      PLAN  []  Upgrade activities as tolerated     [x]  Continue plan of care  []  Update interventions per flow sheet       []  Discharge due to:_  []  Other:_      Tawnya Mckeon, PT 4/3/2018  9:41 AM    Future Appointments  Date Time Provider Patrica Grider   4/5/2018 9:30 AM Tawnya Mckeon, PT [id number] [facility]   4/10/2018 9:30 AM Tawnya Mckeon, PT [id number] [facility]   4/12/2018 9:30 AM Tawnya Mckeon, PT [id number] [facility]   4/16/2018 10:00 AM Tawnya Mckeon, PT [id number] [facility]   4/19/2018 9:30 AM Tawnya Mckeon, PT [id number] [facility]   4/23/2018 10:30 AM Biju Lainez PTA [id number] [facility]   4/26/2018 9:30 AM Tawnya Mckeon, PT [id number] [facility]   2/26/2019 7:45 AM [facility] NURSE VISIT [facility] STEVE JAMES   3/5/2019 1:00 PM Martha Whitley MD [facility]

## 2018-04-05 ENCOUNTER — HOSPITAL ENCOUNTER (OUTPATIENT)
Dept: PHYSICAL THERAPY | Age: 68
Discharge: HOME | End: 2018-04-05
Payer: MEDICARE

## 2018-04-05 PROCEDURE — 97110 THERAPEUTIC EXERCISES: CPT

## 2018-04-05 PROCEDURE — 97140 MANUAL THERAPY 1/> REGIONS: CPT

## 2018-04-05 NOTE — PROGRESS NOTES
PT DAILY TREATMENT NOTE - [facility]     Patient Name: Tommy Quevedo  Date:2018  : 1950  [x]  Patient  Verified  Payor: [place] MEDICARE / Plan: [place] MEDICARE PART A & B / Product Type: Medicare /    In time:9:30  Out time:10:03  Total Treatment Time (min): 33  Total Timed Codes (min): 33  1:1 Treatment Time ( [address] only): 29   Visit #: 9 of 12    Treatment Area: Left shoulder pain [M25.512]    SUBJECTIVE  Pain Level (0-10 scale): 0/10  Any medication changes, allergies to medications, adverse drug reactions, diagnosis change, or new procedure performed?: [x] No    [] Yes (see summary sheet for update)  Subjective functional status/changes:   [] No changes reported  Pt reports that he was doing yard work over the weekend and planting grass, and his shoulder started bothering him so he had to stop. He also has pain over his right clavicle since the yard work, but he always has pain there if he over-does it since he fractured it.      OBJECTIVE    25 min Therapeutic Exercise:  [x] See flow sheet :   Rationale: increase ROM and increase strength to improve the patients ability to improve ease of reaching with ADLs    8 min Manual Therapy:  Grade 3-4 inferior GHJ mobs with PROM abduction, Grade 3-4 posterior GHJ mobs with IR PROM (to approximately 40 dgrs), PROM flexion/abd/ER with oscillations    Rationale: decrease pain, increase ROM and increase tissue extensibility to improve ease of reaching with housework             With   [] TE   [] TA   [] neuro   [] other: Patient Education: [x] Review HEP    [] Progressed/Changed HEP based on:   [] positioning   [] body mechanics   [] transfers   [] heat/ice application    [x] other: advised pt to use a mirror for wand AAROM with HEP to ensure correct form     Other Objective/Functional Measures:     Cues to avoid UT use/shoulder shrug with finger ladder   Finger ladder: #28  With UT substitution, #26 without compensation    UT substitution reduced with mirror for visual feedback, continued to demonstrate UT substitution with end-range overhead AAROM, cued to avoid end-range and only perform in range with correct scapulohumeral rhythm    All PROM performed unforced with firm end-feel all directions   Slight pain with posterior GHJ mobs with IR, pain subsided immediately upon release     FOTO 59     Pain Level (0-10 scale) post treatment: 0/10    ASSESSMENT/Changes in Function:     Pt is making steady progress towards therapy goals. He continues to demonstrate UT substitution likely resulting from compensation for decreased GHJ mobility, decreased force couple for scapular upward rotation d/t decreased LT strength, and decreased RTC stability. Added SB depression this visit to address LT strength deficits. Will continue to address strength, ROM, flexibility, and joint mobility deficits per protocol for return to PLOF. Patient will continue to benefit from skilled PT services to modify and progress therapeutic interventions, address ROM deficits, address strength deficits, analyze and address soft tissue restrictions, analyze and cue movement patterns, assess and modify postural abnormalities and instruct in home and community integration to attain remaining goals. Progress towards goals / Updated goals:  1. Patient will improve FOTO score by 38 points in order to demonstrate a significant improvement in function. Not met. Improved 33 points since eval.  No change since last assessed 4/5/18  2. Patient will improve L shoulder AROM flex/scaption to 120 degrees in order to increase ease of ADLs. 3. Patient will improve behind back reaching to T12 in order to increase ease of getting dressed.      PLAN  []  Upgrade activities as tolerated     [x]  Continue plan of care  []  Update interventions per flow sheet       []  Discharge due to:_  []  Other:_      Daquan Cardenas PT 4/5/2018  9:36 AM    Future Appointments  Date Time Provider Patrica Grider   4/10/2018 9:30 AM Rick Denny, PT [id number] [facility]   4/12/2018 9:30 AM Rick Denny, PT [id number] [facility]   4/16/2018 10:00 AM Rick Denny, PT [id number] [facility]   4/19/2018 9:30 AM Rick Denny, PT [id number] [facility]   4/23/2018 10:30 AM Pia Guidry, PTA [id number] [facility]   4/26/2018 9:30 AM Rick Denny, PT [id number] [facility]   2/26/2019 7:45 AM [facility] NURSE VISIT [facility] STEVE [facility]   3/5/2019 1:00 PM Sigrid Villalba MD [facility]

## 2018-04-10 ENCOUNTER — HOSPITAL ENCOUNTER (OUTPATIENT)
Dept: PHYSICAL THERAPY | Age: 68
Discharge: HOME | End: 2018-04-10
Payer: MEDICARE

## 2018-04-10 PROCEDURE — 97110 THERAPEUTIC EXERCISES: CPT

## 2018-04-10 PROCEDURE — 97140 MANUAL THERAPY 1/> REGIONS: CPT

## 2018-04-10 NOTE — PROGRESS NOTES
PT DAILY TREATMENT NOTE - [facility]     Patient Name: Marie Garland  Date:4/10/2018  : 1950  [x]  Patient  Verified  Payor: VA MEDICARE / Plan: [place] MEDICARE PART A & B / Product Type: Medicare /    In time:930  Out time:1000  Total Treatment Time (min): 30  Total Timed Codes (min): 30  1:1 Treatment Time ([facility] only): 30   Visit #: 10 of 12    Treatment Area: Left shoulder pain [M25.512]    SUBJECTIVE  Pain Level (0-10 scale): 0/10  Any medication changes, allergies to medications, adverse drug reactions, diagnosis change, or new procedure performed?: [x] No    [] Yes (see summary sheet for update)  Subjective functional status/changes:   [] No changes reported  Pt stated that he is doing well today    OBJECTIVE    22 min Therapeutic Exercise:  [x] See flow sheet :   Rationale: increase ROM and increase strength to improve the patients ability to increase ease with ADls    8 min Manual Therapy:  PROM with oscillations and GH mobs   Rationale: increase ROM and increase tissue extensibility to increase ease with ADls    With   [x] TE   [] TA   [] neuro   [] other: Patient Education: [x] Review HEP    [] Progressed/Changed HEP based on:   [] positioning   [] body mechanics   [] transfers   [] heat/ice application    [] other:      Other Objective/Functional Measures:   Pt had no complaint of increased pain with exercises  Pt cont with decreased range of motion for flex, IR and ER  New exercises were challenging     Pain Level (0-10 scale) post treatment: 0/10    ASSESSMENT/Changes in Function:   Pt cont to slowly progress toward goals. Pt cont with decreased strength and range of motion in left shoulder.  Pt cont with decreased ability to perform ADLs and is apprehensive  about riding his bike    Patient will continue to benefit from skilled PT services to modify and progress therapeutic interventions, address functional mobility deficits, address ROM deficits and address strength deficits to attain remaining goals.     []  See Plan of Care  [x]  See progress note/recertification  []  See Discharge Summary         Progress towards goals / Updated goals:  1. Patient will improve FOTO score by 38 points in order to demonstrate a significant improvement in function. Not met. Improved 33 points since eval.  No change since last assessed 4/5/18  2. Patient will improve L shoulder AROM flex/scaption to 120 degrees in order to increase ease of ADLs. 3. Patient will improve behind back reaching to T12 in order to increase ease of getting dressed.      PLAN  []  Upgrade activities as tolerated     [x]  Continue plan of care  []  Update interventions per flow sheet       []  Discharge due to:_  []  Other:_      Lindsay Obrien PTA 4/10/2018  9:33 AM    Future Appointments  Date Time Provider Patrica Grider   4/12/2018 9:30 AM Tee Louie, PT [id number] [facility]   4/16/2018 10:00 AM Tee Louie, PT [id number] [facility]   4/19/2018 9:30 AM Tee Louie, PT [id number] [facility]   4/23/2018 10:30 AM Mirtha Hicks, PTA [id number] [facility]   4/26/2018 9:30 AM Tee Louie, PT [id number] [facility]   2/26/2019 7:45 AM [facility] NURSE VISIT [facility]   3/5/2019 1:00 PM Issa Dempsey MD [facility]

## 2018-04-12 ENCOUNTER — HOSPITAL ENCOUNTER (OUTPATIENT)
Dept: PHYSICAL THERAPY | Age: 68
Discharge: HOME | End: 2018-04-12
Payer: MEDICARE

## 2018-04-12 PROCEDURE — G8984 CARRY CURRENT STATUS: HCPCS

## 2018-04-12 PROCEDURE — 97110 THERAPEUTIC EXERCISES: CPT

## 2018-04-12 PROCEDURE — G8985 CARRY GOAL STATUS: HCPCS

## 2018-04-12 NOTE — PROGRESS NOTES
PT DAILY TREATMENT NOTE - [facility]     Patient Name: Glenna Shabazz  Date:2018  : 1950  [x]  Patient  Verified  Payor: Jules Plan / Plan: VA MEDICARE PART A & B / Product Type: Medicare /    In time:9:30  Out time:10:00  Total Treatment Time (min): 30  Total Timed Codes (min): 30  1:1 Treatment Time ( [address] only): 28   Visit #: 11 of 12    Treatment Area: Left shoulder pain [M25.512]    SUBJECTIVE  Pain Level (0-10 scale): 0/10  Any medication changes, allergies to medications, adverse drug reactions, diagnosis change, or new procedure performed?: [x] No    [] Yes (see summary sheet for update)  Subjective functional status/changes:   [] No changes reported  Pt reports that he is doing well. He knows his arm is still weak. He sees his MD on 18.       OBJECTIVE        30 min Therapeutic Exercise:  [x] See flow sheet :   Rationale: increase ROM and increase strength to improve the patients ability to improve ease of reaching with ADLs            With   [] TE   [] TA   [] neuro   [] other: Patient Education: [x] Review HEP    [] Progressed/Changed HEP based on:   [] positioning   [] body mechanics   [] transfers   [] heat/ice application    [] other:      Other Objective/Functional Measures:     MMT Left Shoulder (in available range)  Flexion 4-/5  Abduction 4-/5  IR 4+/5  ER 4/5     Shoulder AROM (without compensation)  Flexion: Left 121 dgrs, Right 128 dgrs  Abduction: 110 dgrs, 125 dgrs    UT substitution evident B without cuing  Challenged with TB flexion/abd with orange TB, flexion to 90 dgrs, abduction to approximately 60 dgrs    Pain with IR stretch one time, pt reported that he over-stretched, educated pt to perform stretches in pain-free range and he performed 2nd stretch without pain     [place] hold from manual this visit to increase strengthening interventions as strength deficits appear to be greatest functional limitation    Pain Level (0-10 scale) post treatment: 0/10    ASSESSMENT/Changes in Function: See Progress Note    Patient will continue to benefit from skilled PT services to modify and progress therapeutic interventions, address ROM deficits, address strength deficits, analyze and address soft tissue restrictions, analyze and cue movement patterns, assess and modify postural abnormalities and instruct in home and community integration to attain remaining goals.      []  See Plan of Care  [x]  See progress note/recertification  []  See Discharge Summary         Progress towards goals / Updated goals:  See Progress Note       PLAN  []  Upgrade activities as tolerated     [x]  Continue plan of care  []  Update interventions per flow sheet       []  Discharge due to:_  []  Other:_      Devonte Chase, PT 4/12/2018  9:37 AM    Future Appointments  Date Time Provider Patrica Marni   4/16/2018 10:00 AM Devonte Chase, PT [id number] [facility]   4/19/2018 9:30 AM Devonte Chase, PT [id number] [facility]   4/23/2018 10:30 AM Puneet Ramon, PTA [id number] [facility]   4/26/2018 9:30 AM Devonte Chase, PT [email or web address] [facility]   2/26/2019 7:45 AM [facility] NURSE VISIT [facility] STEVE JAMES   3/5/2019 1:00 PM Remigio Gtz MD [facility]

## 2018-04-13 NOTE — PROGRESS NOTES
[facility] [employer]  [address]  [phone] fax    Continued Plan of Care/ Re-certification for Physical Therapy Services    Patient name: Devon Mckeon Start of Care: 18   Referral source: Mackey Gitelman, MD : 1950   Medical/Treatment Diagnosis: Left shoulder pain [M25.512] Onset Date:18     Prior Hospitalization: see medical history Provider#: [id number]   Medications: Verified on Patient Summary List    Comorbidities:  arthritis, history or R rotator cuff surgery in 2014 and clavicle fracture in 2017   Prior Level of Function: Ind with ADLs, driving, bike riding   Visits from Start of Care: 23    Missed Visits: 0    The Plan of Care and following information is based on the patient's current status:  Goal: Patient will improve FOTO score by 38 points in order to demonstrate a significant improvement in function  Status at last note/certification: Progressing. Improved 33 points  Current Status: not met    Goal: Patient will improve L shoulder AROM flex/scaption to 120 degrees in order to increase ease of ADLs. Status at last note/certification: Progressing. Flexion 121 dgrs, Abduction 110 dgrs   Current Status: met/not met    Goal: Patient will improve behind back reaching to T12 in order to increase ease of getting dressed.    Status at last note/certification: Progressing  Current Status: not met      Key functional changes: improving functional use of left UE, improving strength/ROM    Problems/ barriers to goal attainment: clavicular restrictions following fracture resulting in altered scapulohumeral rhythm    Problem List: pain affecting function, decrease ROM, decrease strength, decrease ADL/ functional abilitiies, decrease activity tolerance and decrease flexibility/ joint mobility    Treatment Plan: Therapeutic exercise, Therapeutic activities, Neuromuscular re-education, Physical agent/modality, Gait/balance training, Manual therapy, Patient education, Self Care training, Functional mobility training and Home safety training     Patient Goal (s) has been updated and includes: use my arm more    Goals for this certification period to be accomplished in 6 weeks:  1. Patient will improve FOTO score by 38 points in order to demonstrate a significant improvement in function. 2. Patient will improve left shoulder AROM abduction to 120 degrees in order to increase ease of ADLs. 3. Patient will improve functional IR to T12 in order to increase ease of getting dressed. 4. Pt will improve left shoulder flexion/abd MMT to 4/5 and ER MMT to 4+/5 in order to improve ease of light lifting with household activities     Frequency / Duration: Patient to be seen 2-3 times per week for 6 weeks:    Assessment / Recommendations: Pt is making steady progress in therapy and is progressing towards all updated goals. FOTO score continues to improve, indicative of increased functional use of left UE. Overhead range has reduced slightly since last assessed, however, scapulohumeral rhythm is improving with significant reduction in UT substitution evident. Minor shoulder hike continues to be evident B, likely with degenerative/postural changes contributing. Left shoulder strength is  improving but continues to be decreased, limiting further progress with activity tolerance. Pt will benefit from continued skilled PT to address ROM, strength, flexibility, and joint mobility deficits for increased functional use of left UE and return to PLOF. G-Codes (GP)    Carry   Current  CK= 40-59%   [id number] Goal  CJ= 20-39%      The severity rating is based on clinical judgment and the FOTO score.     Certification Period: 4/13/18 to 7/12/18    Kiara Palacios, PT 4/13/2018 5:15 PM    ________________________________________________________________________  I certify that the above Therapy Services are being furnished while the patient is under my care. I agree with the treatment plan and certify that this therapy is necessary. [] I have read the above and request that my patient continue as recommended.   [] I have read the above report and request that my patient continue therapy with the following changes/special instructions: _______________________________________  [] I have read the above report and request that my patient be discharged from therapy    Physician's Signature:________________________________Date:___________Time:__________    Please sign and return to [facility] - [facility] [employer]  [address] [place] [address]  [phone] fax

## 2018-04-16 ENCOUNTER — HOSPITAL ENCOUNTER (OUTPATIENT)
Dept: PHYSICAL THERAPY | Age: 68
Discharge: HOME | End: 2018-04-16
Payer: MEDICARE

## 2018-04-16 PROCEDURE — 97110 THERAPEUTIC EXERCISES: CPT

## 2018-04-16 NOTE — PROGRESS NOTES
PT DAILY TREATMENT NOTE - [facility]     Patient Name: Raffy Green  Date:2018  : 1950  [x]  Patient  Verified  Payor: Ibrahim Foster / Plan: [place] MEDICARE PART A & B / Product Type: Medicare /    In time:9:30  Out time: 10:42  Total Treatment Time (min): 42  Total Timed Codes (min): 32  1:1 Treatment Time ( [address] only): 27   Visit #: 12 of 12    Treatment Area: Left shoulder pain [M25.512]    SUBJECTIVE  Pain Level (0-10 scale): 0/10  Any medication changes, allergies to medications, adverse drug reactions, diagnosis change, or new procedure performed?: [x] No    [] Yes (see summary sheet for update)  Subjective functional status/changes:   [] No changes reported  Pt reports that he fell on Friday. There was some pollen on the ramp of his shed, and he slipped on the edge. His head hit the side of his shed, and then he landed lightly on his left elbow as he was able to slow the fall. He has had multiple concussions in the past and does not believe he has a concussion now. Denied any concussion sx (vision changes, headache, nausea, difficulty concentrating). Denies increased shoulder pain, only reporting stiffness in left UT. He twisted his left knee when he fell, but it's feeling better now. His MD pushed his appointment back to 18.     OBJECTIVE    10 min Heat  Rationale: to reduce pain and improve tissue extensibility for improved ease of shoulder movements  Position: seated  Location: left UT  Skin assessment: intact    30 min Therapeutic Exercise:  [x] See flow sheet :   Rationale: increase ROM and increase strength to improve the patients ability to improve ease of reaching with ADLs    2 min Manual: TPR left UT  Rationale: to reduce pain, increase tissue extensibility, and reduce trigger points for improved ease of shoulder movements and to educate/demonstrate technique for independent management of trigger points           With   [] TE   [] TA   [] neuro   [] other: Patient Education: [x] Review HEP    [] Progressed/Changed HEP based on:   [] positioning   [] body mechanics   [] transfers   [] heat/ice application    [] other:      Other Objective/Functional Measures:     Discomfort in left UT with any overhead interventions and with last reps of TB interventions  Discomfort always subsided with rest  Hypertonicity/trigger points evident in left UT, addressed manually  Reviewed heat use 10-15 minutes and self DTM/TPR with cane  No increased pain following therapy      Pain Level (0-10 scale) post treatment: 0/10    ASSESSMENT/Changes in Function:     Pt is making slow, steady progress towards therapy goals. He presented with increased hypertonicity/trigger points in UT following fall; however, no increased shoulder pain since fall and no decrease in strength/ROM evident with interventions. Pt was educated on modality/manual interventions to reduce hypertonicity. Will continue to address strength, flexibility, and ROM deficits per protocol for return to PLOF. Patient will continue to benefit from skilled PT services to modify and progress therapeutic interventions, address ROM deficits, address strength deficits, analyze and address soft tissue restrictions, analyze and cue movement patterns, assess and modify postural abnormalities and instruct in home and community integration to attain remaining goals. Progress towards goals / Updated goals:  1. Patient will improve FOTO score by 38 points in order to demonstrate a significant improvement in function. 2. Patient will improve left shoulder AROM abduction to 120 degrees in order to increase ease of ADLs. 3. Patient will improve functional IR to T12 in order to increase ease of getting dressed.    4. Pt will improve left shoulder flexion/abd MMT to 4/5 and ER MMT to 4+/5 in order to improve ease of light lifting with household activities     PLAN  []  Upgrade activities as tolerated     [x]  Continue plan of care  []  Update interventions per flow sheet       []  Discharge due to:_  []  Other:_      Veronica Tavarez, PT 4/16/2018  10:13 AM    Future Appointments  Date Time Provider Patrica Grider   4/19/2018 9:30 AM Clarisa Jeffrey, PT [id number] [facility]   4/23/2018 10:30 AM Sylvia Devine PTA [id number] [facility]   4/26/2018 9:30 AM Lamont Douglass PTA [id number] [facility]   2/26/2019 7:45 AM [facility] NURSE VISIT [facility] STEVE SCHED   3/5/2019 1:00 PM Elbert Sharp MD [facility]

## 2018-04-19 ENCOUNTER — HOSPITAL ENCOUNTER (OUTPATIENT)
Dept: PHYSICAL THERAPY | Age: 68
Discharge: HOME | End: 2018-04-19
Payer: MEDICARE

## 2018-04-19 PROCEDURE — 97110 THERAPEUTIC EXERCISES: CPT

## 2018-04-19 NOTE — PROGRESS NOTES
PT DAILY TREATMENT NOTE - [facility]     Patient Name: Cindy Cervantes  Date:2018  : 1950  [x]  Patient  Verified  Payor: Fatou Macdonalddington / Plan: [place] & [place] / Product Type: Medicare /    In time:9:30am  Out time:10:08am  Total Treatment Time (min): 38  Total Timed Codes (min): 38  1:1 Treatment Time ( [address] only): 25  Visit #: 1     Treatment Area: Left shoulder pain [M25.512]    SUBJECTIVE  Pain Level (0-10 scale): 0/10 in shoulder  Any medication changes, allergies to medications, adverse drug reactions, diagnosis change, or new procedure performed?: [x] No    [] Yes (see summary sheet for update)  Subjective functional status/changes:   [] No changes reported  Patient says his left shoulder feels fine, but his back and left knee are painful and stiff from the fall on 18. Still denies any concussion sx. OBJECTIVE    38 min Therapeutic Exercise:  [x] See flow sheet :   Rationale: increase ROM and increase strength to improve the patients ability to perform ADLs with ease. With   [] TE   [] TA   [] neuro   [] other: Patient Education: [x] Review HEP    [] Progressed/Changed HEP based on:   [] positioning   [] body mechanics   [] transfers   [] heat/ice application    [] other:      Other Objective/Functional Measures:    Progressed standing wand ER c\ stick at 90 deg abd. Patient noted stiffness in new range, but felt like a good stretch. VC to decrease UT compensation which he was rolando to do. Added 1# kieser flexion and abduction to 90 degrees. Patient able to tolerate s\ UT compensation or increased pain. Pain Level (0-10 scale) post treatment: 0/10    ASSESSMENT/Changes in Function:   Patient is progressing towards individual goals while adhereing to protocol. He has increased left shoulder flexion and ER strength improving ease of limiting with household activities and progressing toward LTG#4. Continues to have decreased strength with left shoulder abduction. Patient will continue to benefit from skilled PT services to modify and progress therapeutic interventions, address functional mobility deficits, address ROM deficits, address strength deficits, analyze and address soft tissue restrictions, analyze and cue movement patterns and analyze and modify body mechanics/ergonomics to attain remaining goals. [x]  See Plan of Care  []  See progress note/recertification  []  See Discharge Summary         Progress towards goals / Updated goals:  1. Patient will improve FOTO score by 38 points in order to demonstrate a significant improvement in function. 2. Patient will improve left shoulder AROM abduction to 120 degrees in order to increase ease of ADLs. 3. Patient will improve functional IR to T12 in order to increase ease of getting dressed. 4. Pt will improve left shoulder flexion/abd MMT to 4/5 and ER MMT to 4+/5 in order to improve ease of light lifting with household activities    Progressing: left shoulder flexion 4/5, abduction 3+/5, ER 5/5 tested in seated (4/19/18)    PLAN  [x]  Upgrade activities as tolerated     [x]  Continue plan of care  []  Update interventions per flow sheet       []  Discharge due to:_  []  Other:_      Meghann Ruiz 4/19/2018  9:40 AM    I was present during the entire treatment, directing and participating in the treatment.    Charley Thomas DPT      Future Appointments  Date Time Provider Patrica Grider   4/23/2018 10:30 AM Pia Guidry PTA [id number]   4/26/2018 9:30 AM Christian Vieyra PTA [id number] [facility]   2/26/2019 7:45 AM [facility] NURSE VISIT [facility] STEVE JAMES   3/5/2019 1:00 PM Sigrid Villalba MD [facility]

## 2018-04-23 ENCOUNTER — HOSPITAL ENCOUNTER (OUTPATIENT)
Dept: PHYSICAL THERAPY | Age: 68
Discharge: HOME | End: 2018-04-23
Payer: MEDICARE

## 2018-04-23 PROCEDURE — 97110 THERAPEUTIC EXERCISES: CPT

## 2018-04-23 NOTE — PROGRESS NOTES
PT DAILY TREATMENT NOTE - [facility]     Patient Name: Marie Garland  Date:2018  : 1950  [x]  Patient  Verified  Payor: Tia Holder / Plan: [place] MEDICARE PART A & B / Product Type: Medicare /    In time:10:30  Out time: 11:06  Total Treatment Time (min): 36  Total Timed Codes (min): 36  1:1 Treatment Time ( [address] only): 30  Visit #: 2 of     Treatment Area: Left shoulder pain [M25.512]    SUBJECTIVE  Pain Level (0-10 scale): 0/10  Any medication changes, allergies to medications, adverse drug reactions, diagnosis change, or new procedure performed?: [x] No    [] Yes (see summary sheet for update)  Subjective functional status/changes:   [] No changes reported  Pt reports no pain in his shoulder but his right low back is tight and aching. He states he feels he will be ready to D/C on Thursday with a final HEP. He has no difficulty performing things at home he just needs to work on strength. OBJECTIVE    36 min Therapeutic Exercise:  [x] See flow sheet :   Rationale: increase ROM and increase strength to improve the patients ability to improve ease of performing ADLs          With   [] TE   [] TA   [] neuro   [] other: Patient Education: [x] Review HEP    [] Progressed/Changed HEP based on:   [] positioning   [] body mechanics   [] transfers   [] heat/ice application    [] other:      Other Objective/Functional Measures:   Functional IR: L4  Initiated new exercises for strengthening  Will update HEP for final HEP next visit  Cues to prevent UT hiking with PNF D2  No increased pain during session     Pain Level (0-10 scale) post treatment: 0/10    ASSESSMENT/Changes in Function: Pt is making good progress towards final goals in therapy. He has functional IR that has improved to L4 which is enough for him to performing dressing tasks. He is no longer having difficulty with any ADLs and just needs to continue strengthening at home.  Will continue for one final session to initiate final HEP and then D/C to home for independent strengthening. Patient will continue to benefit from skilled PT services to modify and progress therapeutic interventions, address functional mobility deficits, address ROM deficits, address strength deficits, analyze and address soft tissue restrictions, analyze and cue movement patterns, analyze and modify body mechanics/ergonomics, assess and modify postural abnormalities, address imbalance/dizziness and instruct in home and community integration to attain remaining goals. Progress towards goals / Updated goals:  1. Patient will improve FOTO score by 38 points in order to demonstrate a significant improvement in function. 2. Patient will improve left shoulder AROM abduction to 120 degrees in order to increase ease of ADLs. 3. Patient will improve functional IR to T12 in order to increase ease of getting dressed. Progressing L4 (4/23/18)   4.  Pt will improve left shoulder flexion/abd MMT to 4/5 and ER MMT to 4+/5 in order to improve ease of light lifting with household activities    Progressing: left shoulder flexion 4/5, abduction 3+/5, ER 5/5 tested in seated (4/19/18)    PLAN  [x]  Upgrade activities as tolerated     [x]  Continue plan of care  []  Update interventions per flow sheet       []  Discharge due to:_  []  Other:_      Mary Ann Shearer PTA 4/23/2018  10:03 AM    Future Appointments  Date Time Provider Patrica Grider   4/23/2018 10:30 AM Mary Ann Shearer PTA [id number]   4/26/2018 9:30 AM Lanette Maynard PTA [id number] [facility]   2/26/2019 7:45 AM [facility] NURSE VISIT [facility] STEVE JAMES   3/5/2019 1:00 PM Boy Titus MD [address]

## 2018-04-26 ENCOUNTER — HOSPITAL ENCOUNTER (OUTPATIENT)
Dept: PHYSICAL THERAPY | Age: 68
Discharge: HOME | End: 2018-04-26
Payer: MEDICARE

## 2018-04-26 PROCEDURE — G8986 CARRY D/C STATUS: HCPCS

## 2018-04-26 PROCEDURE — 97110 THERAPEUTIC EXERCISES: CPT

## 2018-04-26 PROCEDURE — G8985 CARRY GOAL STATUS: HCPCS

## 2018-04-26 NOTE — PROGRESS NOTES
PT DAILY TREATMENT NOTE - [facility]     Patient Name: Estela Torres  Date:2018  : 1950  [x]  Patient  Verified  Payor: [place] MEDICARE / Plan: [place] MEDICARE PART A & B / Product Type: Medicare /    In time:930  Out time:1000  Total Treatment Time (min): 30  Total Timed Codes (min): 30  1:1 Treatment Time ( only): 30   Visit #: 3 of     Treatment Area: Left shoulder pain [M25.512]    SUBJECTIVE  Pain Level (0-10 scale): 0/10  Any medication changes, allergies to medications, adverse drug reactions, diagnosis change, or new procedure performed?: [x] No    [] Yes (see summary sheet for update)  Subjective functional status/changes:   [] No changes reported  Pt stated that he is good today    OBJECTIVE    30 min Therapeutic Exercise:  [x] See flow sheet :   Rationale: increase ROM and increase strength to improve the patients ability to increase ease with ADLs    With   [x] TE   [] TA   [] neuro   [] other: Patient Education: [x] Review HEP    [] Progressed/Changed HEP based on:   [] positioning   [] body mechanics   [] transfers   [] heat/ice application    [] other:      Other Objective/Functional Measures:   Pt was issued final HEP     Pain Level (0-10 scale) post treatment: 0/10    ASSESSMENT/Changes in Function:   []  See Plan of Care  []  See progress note/recertification  [x]  See Discharge Summary         Progress towards goals / Updated goals:  1. Patient will improve FOTO score by 38 points in order to demonstrate a significant improvement in function. Goal met. Increased from 26 to 64. 4  2. Patient will improve left shoulder AROM abduction to 120 degrees in order to increase ease of ADLs. Goal met. Left sh AROM abduction is 139*. 18  3. Patient will improve functional IR to T12 in order to increase ease of getting dressed. Progressing L4 (18)   4.  Pt will improve left shoulder flexion/abd MMT to 4/5 and ER MMT to 4+/5 in order to improve ease of light lifting with household activities    Goal met.  Left sh flex strength is 4+/5 and ER strength is 4+/5. 4/26/18    PLAN  []  Upgrade activities as tolerated     [x]  Continue plan of care  []  Update interventions per flow sheet       [x]  Discharge   []  Other:_      Maria Raya PTA 4/26/2018  9:26 AM    Future Appointments  Date Time Provider Patrica Rollinsi   4/26/2018 9:30 AM Maria Raya PTA [id number]   2/26/2019 7:45 AM [facility] NURSE VISIT [facility] STEVE [facility]   3/5/2019 1:00 PM Stanley Gtz MD [address]

## 2018-04-28 NOTE — PROGRESS NOTES
[facility] [employer]  [facility]  [address] [place] [address]  [phone] fax    Physical Therapy Discharge Summary    Patient name: Sina Mark Start of Care: 18   Referral source: Emery Perez MD : 1950   Medical/Treatment Diagnosis: Left shoulder pain [M25.512] Onset Date:18     Prior Hospitalization: see medical history Provider#: [id number]   Medications: Verified on Patient Summary List    Comorbidities:  arthritis, history or R rotator cuff surgery in 2014 and clavicle fracture in 2017   Prior Level of Function: Ind with ADLs, driving, bike riding*    Visits from Start of Care: 27    Missed Visits: 0    Reporting Period : 18 to 18    Summary of Care:  Goal: 1. Patient will improve FOTO score by 38 points in order to demonstrate a significant improvement in function. Status at last note/certification: Goal met. Increased from 26 to 64  Status at discharge: met    Goal: Patient will improve left shoulder AROM abduction to 120 degrees in order to increase ease of ADLs. Status at last note/certification: Goal met. Left sh AROM abduction is 139*. Status at discharge: met    Goal: Patient will improve functional IR to T12 in order to increase ease of getting dressed. Status at last note/certification: Progressing L4  Status at discharge: not met    Goal: Pt will improve left shoulder flexion/abd MMT to 4/5 and ER MMT to 4+/5 in order to improve ease of light lifting with household activities    Status at last note/certification: Goal met. Left sh flex strength is 4+/5 and ER strength is 4+/5. Status at discharge: met    G-Codes (GP)    Carry    Goal  CJ= 20-39%   D/C  CJ= 20-39%      The severity rating is based on clinical judgment and the FOTO score.       ASSESSMENT/RECOMMENDATIONS:   Pt has made steady progress in therapy, meeting 3/4 final goals and progressing towards functional IR AROM goal.  Continued postural deficits are likely contributing to decreased posterior GHJ mobility and continued IR mobility deficits; however, functional IR continues to improve. Left shoulder strength/ROM are [facility] and pt is compliant with updated HEP to continue to address remaining deficits independently. DC at this time as pt has met or is progressing towards therapy goals.      [x]Discontinue therapy: [x]Patient has reached or is progressing toward set goals      []Patient is non-compliant or has abdicated      []Due to lack of appreciable progress towards set goals    Tawnya Mckeon, PT 4/28/2018 7:38 AM

## 2018-05-28 DIAGNOSIS — J40 BRONCHITIS: ICD-10-CM

## 2018-05-28 RX ORDER — GUAIFENESIN AND CODEINE PHOSPHATE 100; 10 MG/5ML; MG/5ML
SYRUP ORAL
Qty: 120 ML | Refills: 0 | Status: SHIPPED | OUTPATIENT
Start: 2018-05-28 | End: 2019-03-11

## 2018-05-29 ENCOUNTER — TELEPHONE (OUTPATIENT)
Dept: INTERNAL MEDICINE CLINIC | Age: 68
End: 2018-05-29

## 2018-08-30 DIAGNOSIS — E78.5 DYSLIPIDEMIA: ICD-10-CM

## 2018-08-30 RX ORDER — ATORVASTATIN CALCIUM 10 MG/1
10 TABLET, FILM COATED ORAL DAILY
Qty: 90 TAB | Refills: 3 | Status: SHIPPED | OUTPATIENT
Start: 2018-08-30 | End: 2019-08-28 | Stop reason: SDUPTHER

## 2018-08-30 NOTE — TELEPHONE ENCOUNTER
From: Emery Velasquez  To: Jorden Lowery MD  Sent: 8/30/2018 9:03 AM EDT  Subject: Medication Renewal Request    Original authorizing provider: MD Rick Esparza.  Horacio Walker would like a refill of the following medications:  atorvastatin (LIPITOR) 10 mg tablet Jorden Lowery MD]    Preferred pharmacy: [address]:  no refills left

## 2019-02-26 ENCOUNTER — HOSPITAL ENCOUNTER (OUTPATIENT)
Dept: LAB | Age: 69
Discharge: HOME | End: 2019-02-26
Payer: MEDICARE

## 2019-02-26 ENCOUNTER — LAB ONLY (OUTPATIENT)
Dept: INTERNAL MEDICINE CLINIC | Age: 69
End: 2019-02-26

## 2019-02-26 DIAGNOSIS — Z00.00 ROUTINE GENERAL MEDICAL EXAMINATION AT A HEALTH CARE FACILITY: ICD-10-CM

## 2019-02-26 DIAGNOSIS — E78.5 DYSLIPIDEMIA: ICD-10-CM

## 2019-02-26 DIAGNOSIS — E78.5 DYSLIPIDEMIA: Primary | ICD-10-CM

## 2019-02-26 LAB
ALBUMIN SERPL-MCNC: 4.3 G/DL (ref 3.4–5)
ALBUMIN/GLOB SERPL: 1.7 {RATIO} (ref 0.8–1.7)
ALP SERPL-CCNC: 63 U/L (ref 45–117)
ALT SERPL-CCNC: 27 U/L (ref 16–61)
ANION GAP SERPL CALC-SCNC: 5 MMOL/L (ref 3–18)
AST SERPL-CCNC: 23 U/L (ref 15–37)
BASOPHILS # BLD: 0.1 K/UL (ref 0–0.1)
BASOPHILS NFR BLD: 1 % (ref 0–2)
BILIRUB SERPL-MCNC: 0.7 MG/DL (ref 0.2–1)
BUN SERPL-MCNC: 14 MG/DL (ref 7–18)
BUN/CREAT SERPL: 17 (ref 12–20)
CALCIUM SERPL-MCNC: 9.4 MG/DL (ref 8.5–10.1)
CHLORIDE SERPL-SCNC: 105 MMOL/L (ref 100–108)
CHOLEST SERPL-MCNC: 124 MG/DL
CO2 SERPL-SCNC: 31 MMOL/L (ref 21–32)
CREAT SERPL-MCNC: 0.81 MG/DL (ref 0.6–1.3)
DIFFERENTIAL METHOD BLD: ABNORMAL
EOSINOPHIL # BLD: 0.4 K/UL (ref 0–0.4)
EOSINOPHIL NFR BLD: 10 % (ref 0–5)
ERYTHROCYTE [DISTWIDTH] IN BLOOD BY AUTOMATED COUNT: 13.5 % (ref 11.6–14.5)
GLOBULIN SER CALC-MCNC: 2.5 G/DL (ref 2–4)
GLUCOSE SERPL-MCNC: 84 MG/DL (ref 74–99)
HCT VFR BLD AUTO: 44.4 % (ref 36–48)
HDLC SERPL-MCNC: 42 MG/DL (ref 40–60)
HDLC SERPL: 3 {RATIO} (ref 0–5)
HGB BLD-MCNC: 14.3 G/DL (ref 13–16)
LDLC SERPL CALC-MCNC: 66.6 MG/DL (ref 0–100)
LIPID PROFILE,FLP: NORMAL
LYMPHOCYTES # BLD: 1.2 K/UL (ref 0.9–3.6)
LYMPHOCYTES NFR BLD: 27 % (ref 21–52)
MCH RBC QN AUTO: 30.6 PG (ref 24–34)
MCHC RBC AUTO-ENTMCNC: 32.2 G/DL (ref 31–37)
MCV RBC AUTO: 94.9 FL (ref 74–97)
MONOCYTES # BLD: 0.5 K/UL (ref 0.05–1.2)
MONOCYTES NFR BLD: 12 % (ref 3–10)
NEUTS SEG # BLD: 2.1 K/UL (ref 1.8–8)
NEUTS SEG NFR BLD: 50 % (ref 40–73)
PLATELET # BLD AUTO: 253 K/UL (ref 135–420)
PMV BLD AUTO: 10.3 FL (ref 9.2–11.8)
POTASSIUM SERPL-SCNC: 4.6 MMOL/L (ref 3.5–5.5)
PROT SERPL-MCNC: 6.8 G/DL (ref 6.4–8.2)
RBC # BLD AUTO: 4.68 M/UL (ref 4.7–5.5)
SODIUM SERPL-SCNC: 141 MMOL/L (ref 136–145)
TRIGL SERPL-MCNC: 77 MG/DL (ref ?–150)
VLDLC SERPL CALC-MCNC: 15.4 MG/DL
WBC # BLD AUTO: 4.3 K/UL (ref 4.6–13.2)

## 2019-02-26 PROCEDURE — 80053 COMPREHEN METABOLIC PANEL: CPT

## 2019-02-26 PROCEDURE — 80061 LIPID PANEL: CPT

## 2019-02-26 PROCEDURE — 36415 COLL VENOUS BLD VENIPUNCTURE: CPT

## 2019-02-26 PROCEDURE — 85025 COMPLETE CBC W/AUTO DIFF WBC: CPT

## 2019-03-03 NOTE — PROGRESS NOTES
This is a Subsequent Medicare Annual Wellness Visit     I have reviewed the patient's medical history in detail and updated the computerized patient record. History     Past Medical History:   Diagnosis Date    Arthritis     Dr. Goldsmith Solar hips/knees/hands    BPH with obstruction/lower urinary tract symptoms 2/23/2017    Cervical radiculopathy 2012    Dr. Julius Coffey polyp     Dr. Jacquie Leon 2009; Dr Maximus Fernandez 9/7/18    Detrusor and sphincter dyssynergia     Dr. Edna Winchester in past    Disc disease, degenerative, thoracic or thoracolumbar     MRI    Dyslipidemia     FHx: colon cancer     Frozen shoulder syndrome 2013    Dr. Christine Smith GERD (gastroesophageal reflux disease)     HSV (herpes simplex virus) infection     Overweight (BMI 25.0-29. 9)     IF 3/19 start weight 165 lbs    Syncope     Dr. Elena Vasquez 2009 neg holter and w/u      Past Surgical History:   Procedure Laterality Date    CHEST SURGERY PROCEDURE UNLISTED  7/14    CT chest neg for nodule    HX CATARACT REMOVAL  2012    Dr. Charley Leon 2009 polyp; 2013; Dr Maximus Fernandez 9/7/18 polyp     HX ORTHOPAEDIC      right hand-tendon repair    HX ORTHOPAEDIC  6/14    shoulder surgery Dr Pastor Ndiaye ORTHOPAEDIC  12/2017    Dr Ely Vee;  left shoulder arthroscopy, SAD, rotator cuff repair, distal clavicle resection, possible biceps tenodesis      Current Outpatient Medications   Medication Sig Dispense Refill    triamcinolone (ARISTOCORT) 0.5 % topical cream Apply  to affected area two (2) times a day. use thin layer 30 g 1    atorvastatin (LIPITOR) 10 mg tablet Take 1 Tab by mouth daily. 90 Tab 3    gabapentin (NEURONTIN) 600 mg tablet take 1 tablet by mouth three times a day 270 Tab 3    multivitamin (ONE A DAY) tablet Take 1 Tab by mouth daily.        Allergies   Allergen Reactions    Flomax [Tamsulosin] Vertigo    Uroxatral [Alfuzosin] Vertigo     And fainting     Family History   Problem Relation Age of Onset    Cancer Mother         colon    Cancer Sister         breast     Social History     Tobacco Use    Smoking status: Never Smoker    Smokeless tobacco: Never Used   Substance Use Topics    Alcohol use: No     Depression Risk Factor Screening:     3 most recent PHQ Screens 3/11/2019   Little interest or pleasure in doing things Not at all   Feeling down, depressed, irritable, or hopeless Not at all   Total Score PHQ 2 0     SCREENINGS  Colonoscopy last done 9/18 Dr Leopoldo Milian  Prostate ODALYS/PSA done 3/18    Immunization History   Administered Date(s) Administered    Influenza High Dose Vaccine PF 10/15/2015, 11/14/2016, 10/03/2017, 09/27/2018    Influenza Vaccine 11/10/2014    Pneumococcal Conjugate (PCV-13) 02/11/2016    Pneumococcal Polysaccharide (PPSV-23) 02/05/2015    Td 01/01/2010    Tdap 02/19/2017    Zoster Vaccine, Live 01/01/2010     Alcohol Risk Factor Screening: On any occasion during the past 3 months, have you had more than 4 drinks containing alcohol? No    Do you average more than 14 drinks per week? No      Functional Ability and Level of Safety:     Hearing Loss   normal-to-mild    Vision - no acute complaints and is followed by Dr Lesa Lozada of [facility]   Self-care. Requires assistance with: no ADLs    Fall Risk     Fall Risk Assessment, last 12 mths 3/11/2019   Able to walk? Yes   Fall in past 12 months? No   Fall with injury? -   Number of falls in past 12 months -   Fall Risk Score -     Abuse Screen   Patient is not abused    Review of Systems   A comprehensive review of systems was negative except for that written in the HPI.     Physical Examination     Evaluation of Cognitive Function:  Mood/affect:  neutral  Appearance: age appropriate, well dressed and within normal Limits  Family member/caregiver input: na    Patient Care Team:  Russell Rivera MD as PCP - General (Internal Medicine)  Jarred Wilcox RN as [address] (Internal Medicine)  Amilcar Garcia MD SHIRA (Orthopedic Surgery)    Advice/Referrals/Counseling   Education and counseling provided:  Are appropriate based on today's review and evaluation  End-of-Life planning (with patient's consent)  Pneumococcal Vaccine  Influenza Vaccine  Prostate cancer screening tests (PSA, covered annually)  Colorectal cancer screening tests  Cardiovascular screening blood test  Diabetes screening test    Assessment/Plan       ICD-10-CM ICD-9-CM    1. Medicare annual wellness visit, subsequent Z00.00 V70.0    2. Back spasm M62.830 724.8 REFERRAL TO SPORTS MEDICINE   3. Dermatitis L30.9 692.9 triamcinolone (ARISTOCORT) 0.5 % topical cream   4. Overweight (BMI 25.0-29. 9) E66.3 278.02    5. Dyslipidemia E78.5 272.4 CBC W/O DIFF      LIPID PANEL      METABOLIC PANEL, COMPREHENSIVE   6. Hyperplastic colonic polyp, unspecified part of colon K63.5 211.3    7. BPH with obstruction/lower urinary tract symptoms N40.1 600.01     N13.8 599.69    8. Primary osteoarthritis involving multiple joints M15.0 715.09    9. Advanced directives, counseling/discussion Z71.89 V65.49 ADVANCE CARE PLANNING FIRST 30 MINS   10. Screening for alcoholism Z13.39 V79.1 ND ANNUAL ALCOHOL SCREEN 15 MIN   11. [facility]   12. Screen for colon cancer Z12.11 V76.51    13. Screening for diabetes mellitus Z13.1 V77.1    14. Screening for ischemic heart disease Z13.6 V81.0    15. Special screening for malignant neoplasm of prostate Z12.5 V76.44 ND PROSTATE CA SCREENING; ODALYS      PSA SCREENING (SCREENING)     current treatment plan is effective, no change in therapy  lab results and schedule of future lab studies reviewed with patient  cardiovascular risk and specific lipid/LDL goals reviewed. End of life discussion undertaken.   He will work on getting medical directive in place  Colonoscopy to be scheduled per Dr Bruno Hamilton 2023, fiber

## 2019-03-03 NOTE — PROGRESS NOTES
70 y.o. white male who presents for evaluation    He seems to be doing very well. Reports no cardiovascular complaints, back to biking 2-3x/week up to 17 miles on a great day. Also walking quite a bit    No new GI or  issues, takes acid blocker prn for specific foods. He has itching in the scrotal area periodically, has been using otc lotrimin which has not resolved the problem. He is interested in seeing a specialist for recurring back spasms, no radicular complaints    LAST MEDICARE WELLNESS EXAM: 2/5/15, 2/12/16, 2/23/17, 3/1/18, 3/11/19    Past Medical History:   Diagnosis Date    Arthritis     Dr. Aleman Friend hips/knees/hands    BPH with obstruction/lower urinary tract symptoms 2/23/2017    Cervical radiculopathy 2012    Dr. Arabella Sweeney polyp     Dr. Charles Brunson 2009; Dr Torrey Rendon 9/7/18    Detrusor and sphincter dyssynergia     Dr. Sarahi Paulson in past    Disc disease, degenerative, thoracic or thoracolumbar     MRI    Dyslipidemia     FHx: colon cancer     Frozen shoulder syndrome 2013    Dr. Jaycee Sandoval GERD (gastroesophageal reflux disease)     HSV (herpes simplex virus) infection     Overweight (BMI 25.0-29. 9)     IF 3/19 start weight 165 lbs    Syncope     Dr. Ruel Bernabe 2009 neg holter and w/u     Past Surgical History:   Procedure Laterality Date    CHEST SURGERY PROCEDURE UNLISTED  7/14    CT chest neg for nodule    HX CATARACT REMOVAL  2012    Dr. Miguel Angel Brunson 2009 polyp; 2013; Dr Torrey Rendon 9/7/18 polyp     HX ORTHOPAEDIC      right hand-tendon repair    HX ORTHOPAEDIC  6/14    shoulder surgery Dr Cathleen Zamorano  12/2017    Dr Navya Kam;  left shoulder arthroscopy, SAD, rotator cuff repair, distal clavicle resection, possible biceps tenodesis      Social History     Socioeconomic History    Marital status:      Spouse name: Not on file    Number of children: Not on file    Years of education: Not on file    Highest education level: Not on file Social Needs    Financial resource strain: Not on file    Food insecurity - worry: Not on file    Food insecurity - inability: Not on file   [employer]riptelligent needs - medical: Not on file   [employer]ript[employer] needs - non-medical: Not on file   Occupational History    Occupation: ret , owns painting [employer]   Tobacco Use    Smoking status: Never Smoker    Smokeless tobacco: Never Used   Substance and Sexual Activity    Alcohol use: No    Drug use: No    Sexual activity: Not on file   Other Topics Concern    Not on file   Social History Narrative    Not on file     Allergies   Allergen Reactions    Flomax [Tamsulosin] Vertigo    Uroxatral [Alfuzosin] Vertigo     And fainting      Current Outpatient Medications   Medication Sig    triamcinolone (ARISTOCORT) 0.5 % topical cream Apply  to affected area two (2) times a day. use thin layer    atorvastatin (LIPITOR) 10 mg tablet Take 1 Tab by mouth daily.  gabapentin (NEURONTIN) 600 mg tablet take 1 tablet by mouth three times a day    multivitamin (ONE A DAY) tablet Take 1 Tab by mouth daily. No current facility-administered medications for this visit.       REVIEW OF SYSTEMS: colo 9/18 Dr Rigoberto Christie, sees Dr Washington Stoll  Ophtho - no vision change or eye pain  Oral - no mouth pain, tongue or tooth problems  Ears - no hearing loss, ear pain, fullness, no swallowing problems  Cardiac - no CP, PND, orthopnea, edema, palpitations or syncope  Chest - no breast masses  Resp - no wheezing, chronic coughing, dyspnea  GI - no heartburn, nausea, vomiting, change in bowel habits, bleeding, hemorrhoids  Urinary - no dysuria, hematuria, flank pain, urgency, frequency  Genitals - no genital lesions, discharge, masses, ulceration, warts  Derm - no nail abnormalities, rashes, lesions of note, hair loss  Psych - denies any anxiety or depression symptoms, no hallucinations or violent ideation  Constitutional - no wt loss, night sweats, unexplained fevers  Neuro - no focal weakness, numbness, paresthesias, incoordination, ataxia, involuntary movements  Endo - no polyuria, polydipsia, nocturia, hot flashes    Visit Vitals  /70   Pulse 65   Temp 98.2 °F (36.8 °C) (Oral)   Resp 14   Ht (P) 5' 7\" (1.702 m)   Wt 165 lb (74.8 kg)   SpO2 95%   BMI (P) 25.84 kg/m²   Affect is appropriate. Mood stable  No apparent distress  HEENT --Anicteric sclerae, tympanic membranes normal,  ear canals normal.  PERRL, EOMI, conjunctiva and lids normal.  Disks were sharp  Sinuses were nontender, turbinates normal, hearing normal.  Oropharynx without  erythema, normal tongue, oral mucosa and tonsils. No thyromegaly, JVD, or bruits. Lungs --Clear to auscultation, normal percussion. Heart --Regular rate and rhythm, no murmurs, rubs, gallops, or clicks. Chest wall --Nontender to palpation. PMI normal.  Rectal showed guaiac neg brown stool normal tone  Prostate without asymmetry, nodularity, tenderness  Scrotal exam showed minimal redness   Abdomen -- Soft and nontender, no hepatosplenomegaly or masses.   Ext without c/c/e    LABS  From 2/11 showed gluc 85, cr 0.80,             alt 23,                   chol 182, tg 123, hdl 37, ldl-c 120, wbc 4.5, hb 13.8, plt 257, psa 1.80       From 2/12 showed gluc 84, cr 0.70,             alt 19, ldl-p 1679                                                         wbc 4.0, hb 13.1, plt 266, psa 2.03  From 3/13 showed gluc 86, cr 0.80,             alt 19, ldl-p 1779, chol 171, tg 93,   hdl 42, ldl-c 110, tsh 0.85  From 9/13 showed                        ldl-p 1809, chol 192, tg 92,   hdl 49, ldl-c 125  From 1/14 showed gluc 84, cr 0.90, gfr>60, alt 41, ldl-p 1062, chol 131, tg 120, hdl 41, ldl-c 66,   tsh 0.85  From 6/14 showed gluc 83, cr 0.80, gfr>60,                   wbc 4.5, hb 14.0, plt 231  From 2/15 showed           chol 123, tg 79,   hdl 42, ldl-c 66,          psa 2.78  From 2/16 showed gluc 87, cr 0.85, gfr>60, alt 33,      chol 121, tg 103, hdl 34, ldl-c 66,   wbc 4.7, hb 14.2, plt 281, psa 3.20  From 2/17 showed gluc 85, cr 0.85, gfr>60, alt 32,       chol 127, tg 63,   hdl 54, ldl-c 60,   wbc 4.3, hb 14.9, plt 247, hep c neg  From 12/17 showed glu 96, cr 0.70, gfr>60,                   wbc 4.7, hb 14.1, plt 255    Results for orders placed or performed during the hospital encounter of 02/26/19   CBC WITH AUTOMATED DIFF   Result Value Ref Range    WBC 4.3 (L) 4.6 - 13.2 K/uL    RBC 4.68 (L) 4.70 - 5.50 M/uL    HGB 14.3 13.0 - 16.0 g/dL    HCT 44.4 36.0 - 48.0 %    MCV 94.9 74.0 - 97.0 FL    MCH 30.6 24.0 - 34.0 PG    MCHC 32.2 31.0 - 37.0 g/dL    RDW 13.5 11.6 - 14.5 %    PLATELET 867 006 - 981 K/uL    MPV 10.3 9.2 - 11.8 FL    NEUTROPHILS 50 40 - 73 %    LYMPHOCYTES 27 21 - 52 %    MONOCYTES 12 (H) 3 - 10 %    EOSINOPHILS 10 (H) 0 - 5 %    BASOPHILS 1 0 - 2 %    ABS. NEUTROPHILS 2.1 1.8 - 8.0 K/UL    ABS. LYMPHOCYTES 1.2 0.9 - 3.6 K/UL    ABS. MONOCYTES 0.5 0.05 - 1.2 K/UL    ABS. EOSINOPHILS 0.4 0.0 - 0.4 K/UL    ABS. BASOPHILS 0.1 0.0 - 0.1 K/UL    DF AUTOMATED     METABOLIC PANEL, COMPREHENSIVE   Result Value Ref Range    Sodium 141 136 - 145 mmol/L    Potassium 4.6 3.5 - 5.5 mmol/L    Chloride 105 100 - 108 mmol/L    CO2 31 21 - 32 mmol/L    Anion gap 5 3.0 - 18 mmol/L    Glucose 84 74 - 99 mg/dL    BUN 14 7.0 - 18 MG/DL    Creatinine 0.81 0.6 - 1.3 MG/DL    BUN/Creatinine ratio 17 12 - 20      GFR est AA >60 >60 ml/min/1.73m2    GFR est non-AA >60 >60 ml/min/1.73m2    Calcium 9.4 8.5 - 10.1 MG/DL    Bilirubin, total 0.7 0.2 - 1.0 MG/DL    ALT (SGPT) 27 16 - 61 U/L    AST (SGOT) 23 15 - 37 U/L    Alk.  phosphatase 63 45 - 117 U/L    Protein, total 6.8 6.4 - 8.2 g/dL    Albumin 4.3 3.4 - 5.0 g/dL    Globulin 2.5 2.0 - 4.0 g/dL    A-G Ratio 1.7 0.8 - 1.7     LIPID PANEL   Result Value Ref Range    LIPID PROFILE          Cholesterol, total 124 <200 MG/DL    Triglyceride 77 <150 MG/DL    HDL Cholesterol 42 40 - 60 MG/DL    LDL, calculated 66.6 0 - 100 MG/DL VLDL, calculated 15.4 MG/DL    CHOL/HDL Ratio 3.0 0 - 5.0       Patient Active Problem List   Diagnosis Code    Colon polyp Dr. Aleena Ortiz  K63.5    Dyslipidemia E78.5    Arthritis, degenerative Dr Simran Barber M19.90    BPH with obstruction/lower urinary tract symptoms N40.1, N13.8    Overweight (BMI 25.0-29. 9) E66.3     Assessment and plan:  1. GERD. Continue avoidance measures and otc meds  2. Dyslipidemia. Continue lipitor  3. Colon polyp and FH colon ca. Fiber, f/u 2023  4. BPH/LUTS. Doing well off meds  5. Overweight. Intermittent fasting discussed at length. Explained the concepts in detail. Went over possible physiologic changes that could occur and how that would possibly impact his situation in a positive way. Discussed 16:8 program in particular. We also went over the differences between hunger and ric hypoglycemia. He will do some more research and consider implementing in the near future, standard handout given  6. Derm. Tmc cream sent  7. Back spasms. Will refer to Dr Dunia Branham        RTC 3/20    Above conditions discussed at length and patient vocalized understanding.   All questions answered to patient satisfaction

## 2019-03-11 ENCOUNTER — OFFICE VISIT (OUTPATIENT)
Dept: INTERNAL MEDICINE CLINIC | Age: 69
End: 2019-03-11

## 2019-03-11 VITALS
TEMPERATURE: 98.2 F | RESPIRATION RATE: 14 BRPM | BODY MASS INDEX: 25.84 KG/M2 | OXYGEN SATURATION: 95 % | DIASTOLIC BLOOD PRESSURE: 70 MMHG | HEART RATE: 65 BPM | SYSTOLIC BLOOD PRESSURE: 111 MMHG | WEIGHT: 165 LBS

## 2019-03-11 DIAGNOSIS — Z13.1 SCREENING FOR DIABETES MELLITUS: ICD-10-CM

## 2019-03-11 DIAGNOSIS — Z13.31 SCREENING FOR DEPRESSION: ICD-10-CM

## 2019-03-11 DIAGNOSIS — Z12.11 SCREEN FOR COLON CANCER: ICD-10-CM

## 2019-03-11 DIAGNOSIS — N40.1 BPH WITH OBSTRUCTION/LOWER URINARY TRACT SYMPTOMS: ICD-10-CM

## 2019-03-11 DIAGNOSIS — Z13.39 SCREENING FOR ALCOHOLISM: ICD-10-CM

## 2019-03-11 DIAGNOSIS — M15.9 PRIMARY OSTEOARTHRITIS INVOLVING MULTIPLE JOINTS: ICD-10-CM

## 2019-03-11 DIAGNOSIS — E78.5 DYSLIPIDEMIA: ICD-10-CM

## 2019-03-11 DIAGNOSIS — N13.8 BPH WITH OBSTRUCTION/LOWER URINARY TRACT SYMPTOMS: ICD-10-CM

## 2019-03-11 DIAGNOSIS — M62.830 BACK SPASM: ICD-10-CM

## 2019-03-11 DIAGNOSIS — Z12.5 SPECIAL SCREENING FOR MALIGNANT NEOPLASM OF PROSTATE: ICD-10-CM

## 2019-03-11 DIAGNOSIS — Z13.6 SCREENING FOR ISCHEMIC HEART DISEASE: ICD-10-CM

## 2019-03-11 DIAGNOSIS — Z00.00 MEDICARE ANNUAL WELLNESS VISIT, SUBSEQUENT: Primary | ICD-10-CM

## 2019-03-11 DIAGNOSIS — L30.9 DERMATITIS: ICD-10-CM

## 2019-03-11 DIAGNOSIS — E66.3 OVERWEIGHT (BMI 25.0-29.9): ICD-10-CM

## 2019-03-11 DIAGNOSIS — K63.5 HYPERPLASTIC COLONIC POLYP, UNSPECIFIED PART OF COLON: ICD-10-CM

## 2019-03-11 DIAGNOSIS — Z71.89 ADVANCED DIRECTIVES, COUNSELING/DISCUSSION: ICD-10-CM

## 2019-03-11 RX ORDER — TRIAMCINOLONE ACETONIDE 5 MG/G
CREAM TOPICAL 2 TIMES DAILY
Qty: 30 G | Refills: 1 | Status: SHIPPED | OUTPATIENT
Start: 2019-03-11 | End: 2020-06-04

## 2019-03-11 NOTE — ACP (ADVANCE CARE PLANNING)
Advance Care Planning    Advance Care Planning (ACP) Provider Note - Comprehensive     Date of ACP Conversation: 03/11/19  Persons included in Conversation:  patient  Length of ACP Conversation in minutes:  16 minutes    Authorized Decision Maker (if patient is incapable of making informed decisions): This person is: daughter [facility]  Other Legally Authorized Decision Maker (e.g. Next of Kin)          General ACP for ALL Patients with Decision Making Capacity:   Importance of advance care planning, including choosing a healthcare agent to communicate patient's healthcare decisions if patient lost the ability to make decisions, such as after a sudden illness or accident  Understanding of the healthcare agent role was assessed and information provided  Exploration of values, goals, and preferences if recovery is not expected, even with continued medical treatment in the event of: Imminent death  Severe, permanent brain injury    Review of Existing Advance Directive:  na    For Serious or Chronic Illness:  Understanding of medical condition    Understanding of CPR, goals and expected outcomes, benefits and burdens discussed.     Interventions Provided:  Recommended completion of Advance Directive form after review of ACP materials and conversation with prospective healthcare agent   Recommended communicating the plan and making copies for the healthcare agent, personal physician, and others as appropriate (e.g., health system)  Recommended review of completed ACP document annually or upon change in health status

## 2019-03-11 NOTE — PATIENT INSTRUCTIONS
Medicare Wellness Visit, Male    The best way to live healthy is to have a lifestyle where you eat a well-balanced diet, exercise regularly, limit alcohol use, and quit all forms of tobacco/nicotine, if applicable. Regular preventive services are another way to keep healthy. Preventive services (vaccines, screening tests, monitoring & exams) can help personalize your care plan, which helps you manage your own care. Screening tests can find health problems at the earliest stages, when they are easiest to treat. [facility] follows the current, evidence-based guidelines published by the [facility] Piyush Nathan ([facility]) when recommending preventive services for our patients. Because we follow these guidelines, sometimes recommendations change over time as research supports it. (For example, a prostate screening blood test is no longer routinely recommended for men with no symptoms.)  Of course, you and your doctor may decide to screen more often for some diseases, based on your risk and co-morbidities (chronic disease you are already diagnosed with). Preventive services for you include:  - Medicare offers their members a free annual wellness visit, which is time for you and your primary care provider to discuss and plan for your preventive service needs. Take advantage of this benefit every year!  -All adults over age 72 should receive the recommended pneumonia vaccines. Current USPSTF guidelines recommend a series of two vaccines for the best pneumonia protection.   -All adults should have a flu vaccine yearly and an ECG.  All adults age 61 and older should receive a shingles vaccine once in their lifetime.    -All adults age 38-68 who are overweight should have a diabetes screening test once every three years.   -Other screening tests & preventive services for persons with diabetes include: an eye exam to screen for diabetic retinopathy, a kidney function test, a foot exam, and stricter control over your cholesterol.   -Cardiovascular screening for adults with routine risk involves an electrocardiogram (ECG) at intervals determined by the provider.   -Colorectal cancer screening should be done for adults age 54-65 with no increased risk factors for colorectal cancer. There are a number of acceptable methods of screening for this type of cancer. Each test has its own benefits and drawbacks. Discuss with your provider what is most appropriate for you during your annual wellness visit. The different tests include: colonoscopy (considered the best screening method), a fecal occult blood test, a fecal DNA test, and sigmoidoscopy.  -All adults born between [facility] should be screened once for Hepatitis C.  -An Abdominal Aortic Aneurysm (AAA) Screening is recommended for men age 73-68 who has ever smoked in their lifetime.      Here is a list of your current Health Maintenance items (your personalized list of preventive services) with a due date:  Health Maintenance Due   Topic Date Due    Shingles Vaccine (1 of 2) 11/26/1999    Glaucoma Screening   02/18/2019    Annual Well Visit  03/02/2019

## 2019-03-11 NOTE — PROGRESS NOTES
Chief Complaint   Patient presents with    Annual Wellness Visit    Cholesterol Problem     follow up with labs         1. Have you been to the ER, urgent care clinic or hospitalized since your last visit? NO.     2. Have you seen or consulted any other health care providers outside of the [address] [place] [address] since your last visit (Include any pap smears or colon screening)?  NO

## 2019-04-08 RX ORDER — GABAPENTIN 600 MG/1
600 TABLET ORAL 2 TIMES DAILY
Qty: 270 TAB | Refills: 3 | Status: SHIPPED | OUTPATIENT
Start: 2019-04-08 | End: 2019-10-30 | Stop reason: SDUPTHER

## 2019-06-04 ENCOUNTER — TELEPHONE (OUTPATIENT)
Dept: INTERNAL MEDICINE CLINIC | Age: 69
End: 2019-06-04

## 2019-06-04 NOTE — TELEPHONE ENCOUNTER
Pt called and said he just had an accident at home and thinks he may have broke his thumb. He is going to head to an urgent care location to get an xray. Wanted RD to know.

## 2019-06-05 ENCOUNTER — HOSPITAL ENCOUNTER (EMERGENCY)
Age: 69
Discharge: HOME | End: 2019-06-05
Attending: EMERGENCY MEDICINE
Payer: MEDICARE

## 2019-06-05 ENCOUNTER — APPOINTMENT (OUTPATIENT)
Dept: GENERAL RADIOLOGY | Age: 69
End: 2019-06-05
Attending: EMERGENCY MEDICINE
Payer: MEDICARE

## 2019-06-05 VITALS
DIASTOLIC BLOOD PRESSURE: 62 MMHG | BODY MASS INDEX: 24.55 KG/M2 | OXYGEN SATURATION: 98 % | RESPIRATION RATE: 16 BRPM | HEART RATE: 66 BPM | SYSTOLIC BLOOD PRESSURE: 123 MMHG | WEIGHT: 162 LBS | HEIGHT: 68 IN | TEMPERATURE: 98 F

## 2019-06-05 DIAGNOSIS — S63.602A SPRAIN OF LEFT THUMB, INITIAL ENCOUNTER: Primary | ICD-10-CM

## 2019-06-05 DIAGNOSIS — M79.645 THUMB PAIN, LEFT: ICD-10-CM

## 2019-06-05 PROCEDURE — 75810000053 HC SPLINT APPLICATION

## 2019-06-05 PROCEDURE — 99283 EMERGENCY DEPT VISIT LOW MDM: CPT

## 2019-06-05 PROCEDURE — 74011250636 HC RX REV CODE- 250/636: Performed by: EMERGENCY MEDICINE

## 2019-06-05 PROCEDURE — 96372 THER/PROPH/DIAG INJ SC/IM: CPT

## 2019-06-05 PROCEDURE — 73130 X-RAY EXAM OF HAND: CPT

## 2019-06-05 RX ORDER — KETOROLAC TROMETHAMINE 30 MG/ML
30 INJECTION, SOLUTION INTRAMUSCULAR; INTRAVENOUS
Status: COMPLETED | OUTPATIENT
Start: 2019-06-05 | End: 2019-06-05

## 2019-06-05 RX ORDER — NAPROXEN 375 MG/1
375 TABLET ORAL 2 TIMES DAILY WITH MEALS
Qty: 14 TAB | Refills: 0 | Status: SHIPPED | OUTPATIENT
Start: 2019-06-05 | End: 2019-06-12

## 2019-06-05 RX ORDER — TRAMADOL HYDROCHLORIDE 50 MG/1
50 TABLET ORAL
Qty: 12 TAB | Refills: 0 | Status: SHIPPED | OUTPATIENT
Start: 2019-06-05 | End: 2019-06-08

## 2019-06-05 RX ADMIN — KETOROLAC TROMETHAMINE 30 MG: 30 INJECTION, SOLUTION INTRAMUSCULAR; INTRAVENOUS at 08:06

## 2019-06-05 NOTE — ED PROVIDER NOTES
EMERGENCY DEPARTMENT HISTORY AND PHYSICAL EXAM    7:47 AM      Date: 6/5/2019  Patient Name: Yris Nicole    History of Presenting Illness     Chief Complaint   Patient presents with    Thumb Pain         History Provided By: Patient    Additional History (Context): Yris Nicole is a 70 y.o. male with Past medical history of dyslipidemia, GERD who presents with chief complaint of moderate left thumb pain since yesterday. Patient states that he was manipulating his ladder at home, pulled on the string which broke and the ladder came down on his left thumb. He states that on yesterday there was swelling which has gone down significantly since he has been using ice. He denies any other injury, no numbness or tingling, weakness, dizziness, nausea no other complaint. PCP: Becky Flores MD        Past History     Past Medical History:  Past Medical History:   Diagnosis Date    Arthritis     Dr. 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 Ethan Laboy polyp     Dr. Vicki Bell 2009; Dr Kailey Clements 9/7/18    Detrusor and sphincter dyssynergia     Dr. Altagracia Figueroa in past    Disc disease, degenerative, thoracic or thoracolumbar     MRI    Dyslipidemia     FHx: colon cancer     Frozen shoulder syndrome 2013    Dr. Victorino Carrington GERD (gastroesophageal reflux disease)     HSV (herpes simplex virus) infection     Overweight (BMI 25.0-29. 9)     IF 3/19 start weight 165 lbs    Syncope     Dr. Montse Vyas 2009 neg holter and w/u       Past Surgical History:  Past Surgical History:   Procedure Laterality Date    CHEST SURGERY PROCEDURE UNLISTED  7/14    CT chest neg for nodule    HX CATARACT REMOVAL  2012    Dr. Dina Bell 2009 polyp; 2013; Dr Kailey Clements 9/7/18 polyp        Family History:  Family History   Problem Relation Age of Onset    Cancer Mother         colon    Cancer Sister         breast       Social History:  Social History     Tobacco Use    Smoking status: Never Smoker    Smokeless tobacco: Never Used   Substance Use Topics    Alcohol use: No    Drug use: No       Allergies: Allergies   Allergen Reactions    Flomax [Tamsulosin] Vertigo    Uroxatral [Alfuzosin] Vertigo     And fainting         Review of Systems       Review of Systems   Constitutional: Negative for chills and fever. Respiratory: Negative for shortness of breath. Gastrointestinal: Negative for nausea and vomiting. Musculoskeletal: Positive for arthralgias (Left thumb pain and swelling) and joint swelling. Negative for neck pain and neck stiffness. Skin: Negative for pallor and rash. Neurological: Negative for dizziness, weakness and numbness. Hematological: Does not bruise/bleed easily. Psychiatric/Behavioral: Negative for confusion and dysphoric mood. All other systems reviewed and are negative. Physical Exam     Visit Vitals  /62 (BP 1 Location: Right arm, BP Patient Position: At rest)   Pulse 66   Temp 98 °F (36.7 °C)   Resp 16   Ht 5' 7.75\" (1.721 m)   Wt 73.5 kg (162 lb)   SpO2 98%   BMI 24.81 kg/m²         Physical Exam   Constitutional: He is oriented to person, place, and time. He appears well-developed and well-nourished. No distress. HENT:   Head: Normocephalic and atraumatic. Eyes: Pupils are equal, round, and reactive to light. Conjunctivae and EOM are normal.   Neck: Normal range of motion. Cardiovascular: Normal rate and intact distal pulses. Pulmonary/Chest: Effort normal. No respiratory distress. Musculoskeletal: Normal range of motion. He exhibits tenderness. He exhibits no deformity. Tenderness at the base of the left thumb as well as further mid distally. No tenderness at the tip of the thumb. No nail damage. There is some mild inflammation which he states is significantly improved. There is no compartment tightness. There is mild bruising at the base of the thumb region. He has decreased range of motion due to pain extremities but can dorsiflex and extend the thumb. No other hand, wrist or forearm tenderness. Good radial pulse. Cap refill less than 2 seconds in the left thumb and fingertips   Neurological: He is alert and oriented to person, place, and time. No cranial nerve deficit. He exhibits normal muscle tone. Coordination normal.   Sensation intact  Good left hand strength although some pain limitation. Skin: Skin is warm and dry. He is not diaphoretic. No erythema. No pallor. Psychiatric: He has a normal mood and affect. His behavior is normal.   Nursing note and vitals reviewed. Diagnostic Study Results     Labs -  No results found for this or any previous visit (from the past 12 hour(s)). Radiologic Studies -   XR HAND LT MIN 3 V   Final Result   IMPRESSION:      1. No acute fracture or subluxation. 2.  Osteoarthrosis. Medical Decision Making   I am the first provider for this patient. I reviewed the vital signs, available nursing notes, past medical history, past surgical history, family history and social history. Vital Signs-Reviewed the patient's vital signs.          Records Reviewed: Nursing Notes and Old Medical Records (Time of Review: 7:47 AM)    MDM   DDX: Sprain, fracture, internal derangement    Analgesia, x-ray    Medications   ketorolac (TORADOL) injection 30 mg (30 mg IntraMUSCular Given 6/5/19 0806)     Splint, Thumb Gutter  Date/Time: 6/5/2019 8:35 AM  Performed by: Chris Pierson DO  Authorized by: Chris Pierson DO     Consent:     Consent obtained:  Verbal    Consent given by:  Patient    Risks discussed:  Numbness, pain, swelling and discoloration    Alternatives discussed:  Alternative treatment, observation and referral  Universal protocol:     Procedure explained and questions answered to patient or proxy's satisfaction: yes      Imaging studies available: yes      Patient identity confirmed:  Verbally with patient, arm band and hospital-assigned identification number  Pre-procedure details:     Sensation:  Normal    Skin color:  Pink with good perfusion  Procedure details:     Laterality:  Left    Location: Thumb, hand. Splint type:  Thumb spica    Supplies:  Ortho-Glass  Post-procedure details:     Pain:  Improved    Sensation:  Normal    Skin color:  Pink with good perfusion    Patient tolerance of procedure: Tolerated well, no immediate complications          ED Course: Progress Notes, Reevaluation, and Consults:  I have reassessed the patient. I have discussed the workup, results and plan with the patient and patient is in agreement. Patient is feeling letter. Patient will be prescribed tramadol, Naprosyn. Patient was discharge in stable condition. Patient was given outpatient follow up. Patient is to return to emergency department if any new or worsening condition. Diagnosis     Clinical Impression:   1. Sprain of left thumb, initial encounter    2. Thumb pain, left        Disposition: Discharged    Follow-up Information     Follow up With Specialties Details Why Contact Info    Trisha Meckel A, DO Hand Surgery Schedule an appointment as soon as possible for a visit in 1 day  [address] [facility] [facility] [address] [facility] [address]      Bebeto Hernandes MD Internal Medicine In 1 week  [address] [facility] [address] [facility] [address]  [phone] [address] [facility] EMERGENCY DEPT Emergency Medicine  As needed, If symptoms worsen [address] [facility]  [phone]           Patient's Medications   Start Taking    NAPROXEN (NAPROSYN) 375 MG TABLET    Take 1 Tab by mouth two (2) times daily (with meals) for 7 days. For pain    TRAMADOL (ULTRAM) 50 MG TABLET    Take 1 Tab by mouth every six (6) hours as needed for Pain for up to 3 days. Max Daily Amount: 200 mg. Continue Taking    ATORVASTATIN (LIPITOR) 10 MG TABLET    Take 1 Tab by mouth daily.     GABAPENTIN (NEURONTIN) 600 MG TABLET    Take 1 Tab by mouth two (2) times a day. MULTIVITAMIN (ONE A DAY) TABLET    Take 1 Tab by mouth daily. TRIAMCINOLONE (ARISTOCORT) 0.5 % TOPICAL CREAM    Apply  to affected area two (2) times a day. use thin layer   These Medications have changed    No medications on file   Stop Taking    No medications on file         Blinda Ankush,     Dragon medical dictation software was used for portions of this report. Unintended transcription errors may occur. My signature above authenticates this document and my orders, the final    diagnosis (es), discharge prescription (s), and instructions in the Epic    record.

## 2019-06-05 NOTE — ED NOTES
Written discharge instructions given and reviewed, patient verbalized understanding. Splint checked by physician. Armband removed.

## 2019-06-05 NOTE — DISCHARGE INSTRUCTIONS
Patient Education     If you were prescribed any medication take as directed. Do not drive or use heavy equipment if prescribed narcotics. Follow up with your primary care physician or with specialist as directed. Return to the emergency room with any new or worsening conditions. Hand Pain: Care Instructions  Your Care Instructions    Common causes of hand pain are overuse and injuries, such as might happen during sports or home repair projects. Everyday wear and tear, especially as you get older, also can cause hand pain. Most minor hand injuries will heal on their own, and home treatment is usually all you need to do. If you have sudden and severe pain, you may need tests and treatment. Follow-up care is a key part of your treatment and safety. Be sure to make and go to all appointments, and call your doctor if you are having problems. It's also a good idea to know your test results and keep a list of the medicines you take. How can you care for yourself at home? · Take pain medicines exactly as directed. ? If the doctor gave you a prescription medicine for pain, take it as prescribed. ? If you are not taking a prescription pain medicine, ask your doctor if you can take an over-the-counter medicine. · Rest and protect your hand. Take a break from any activity that may cause pain. · Put ice or a cold pack on your hand for 10 to 20 minutes at a time. Put a thin cloth between the ice and your skin. · Prop up the sore hand on a pillow when you ice it or anytime you sit or lie down during the next 3 days. Try to keep it above the level of your heart. This will help reduce swelling. · If your doctor recommends a sling, splint, or elastic bandage to support your hand, wear it as directed. When should you call for help? Call 911 anytime you think you may need emergency care.  For example, call if:    · Your hand turns cool or pale or changes color.    Call your doctor now or seek immediate medical care if:    · You cannot move your hand.     · Your hand pops, moves out of its normal position, and then returns to its normal position.     · You have signs of infection, such as:  ? Increased pain, swelling, warmth, or redness. ? Red streaks leading from the sore area. ? Pus draining from a place on your hand. ? A fever.     · Your hand feels numb or tingly.    Watch closely for changes in your health, and be sure to contact your doctor if:    · Your hand feels unstable when you try to use it.     · You do not get better as expected.     · You have any new symptoms, such as swelling.     · Bruises from an injury to your hand last longer than 2 weeks. Where can you learn more? Go to http://cheyanne-ted.info/. Enter R273 in the search box to learn more about \"Hand Pain: Care Instructions. \"  Current as of: September 23, 2018  Content Version: 11.9  © 3631-2965 [employer]. Care instructions adapted under license by [employer] (which disclaims liability or warranty for this information). If you have questions about a medical condition or this instruction, always ask your healthcare professional. [place] [address] any warranty or liability for your use of this information. Patient Education        Finger Sprain: Care Instructions  Overview    A sprain is an injury to the tough fibers (ligaments) that connect bone to bone. This injury can happen in joints such as in your finger. Some sprains stretch the ligaments but don't tear them. More severe sprains can partly or completely tear the ligaments. Sprains can cause pain and swelling. It may take weeks to months before your finger can move easily and without pain. Resting the finger for a short time after the injury can help you heal. To keep the injured finger in position while it heals, your doctor may have put a splint on it. Or the doctor may have taped the finger to the one next to it.  After the pain and swelling have gone down, your doctor may recommend exercises to strengthen your finger or more treatment if needed. Follow-up care is a key part of your treatment and safety. Be sure to make and go to all appointments, and call your doctor if you are having problems. It's also a good idea to know your test results and keep a list of the medicines you take. How can you care for yourself at home? · If your doctor put a splint on your finger, wear the splint as directed. Don't remove it until your doctor says it's okay. · If your fingers are taped together, make sure that the tape is snug. But it shouldn't be so tight that the fingers get numb or tingle. You can loosen the tape if it's too tight. If you need to retape your fingers, always put padding between the fingers before you put on the new tape. · Put ice or a cold pack on your finger for 10 to 20 minutes at a time. Try to do this every 1 to 2 hours for the first 3 days (when you are awake) or until the swelling goes down. Put a thin cloth between the ice and your skin. · Prop up your hand on a pillow when you ice it or anytime you sit or lie down during the next 3 days. Try to keep it above the level of your heart. This will help reduce swelling. · Be safe with medicines. Read and follow all instructions on the label. ? If the doctor gave you a prescription medicine for pain, take it as prescribed. ? If you are not taking a prescription pain medicine, ask your doctor if you can take an over-the-counter medicine. · If your doctor recommends exercises, do them as directed. When should you call for help? Call your doctor now or seek immediate medical care if:    · You have new or worse pain.     · Your finger is cool or pale or changes color.     · Your finger is tingly, weak, or numb.    Watch closely for changes in your health, and be sure to contact your doctor if:    · You do not get better as expected. Where can you learn more?   Go to http://cheyanne-ted.info/. Enter F155 in the search box to learn more about \"Finger Sprain: Care Instructions. \"  Current as of: September 20, 2018  Content Version: 11.9  © 7247-7611 [employer]. Care instructions adapted under license by [employer] (which disclaims liability or warranty for this information). If you have questions about a medical condition or this instruction, always ask your healthcare professional. [address] any warranty or liability for your use of this information.

## 2019-06-13 ENCOUNTER — OFFICE VISIT (OUTPATIENT)
Dept: ORTHOPEDIC SURGERY | Age: 69
End: 2019-06-13

## 2019-06-13 VITALS
RESPIRATION RATE: 13 BRPM | WEIGHT: 162 LBS | DIASTOLIC BLOOD PRESSURE: 63 MMHG | BODY MASS INDEX: 24.55 KG/M2 | OXYGEN SATURATION: 94 % | HEART RATE: 60 BPM | TEMPERATURE: 97.4 F | HEIGHT: 68 IN | SYSTOLIC BLOOD PRESSURE: 116 MMHG

## 2019-06-13 DIAGNOSIS — S60.012A CONTUSION OF LEFT THUMB WITHOUT DAMAGE TO NAIL, INITIAL ENCOUNTER: Primary | ICD-10-CM

## 2019-06-13 NOTE — PROGRESS NOTES
1. Have you been to the ER, urgent care clinic since your last visit? Hospitalized since your last visit? No    2. Have you seen or consulted any other health care providers outside of the [address] [place] [address] since your last visit? Include any pap smears or colon screening.  No

## 2019-06-13 NOTE — PROGRESS NOTES
Melynda Barthel is a 70 y.o. male right handed retiree. Worker's Compensation and legal considerations: not known. Vitals:    06/13/19 1421   BP: 116/63   Pulse: 60   Resp: 13   Temp: 97.4 °F (36.3 °C)   TempSrc: Oral   SpO2: 94%   Weight: 162 lb (73.5 kg)   Height: 5' 7.75\" (1.721 m)   PainSc:   4   PainLoc: Finger           Chief Complaint   Patient presents with    Thumb Pain     LEFT THUMB PAIN         HPI: Patient comes in today 9 days after a ladder fell on his left thumb. He was seen in the emergency department and had x-rays taken which were negative for any fracture. He was placed into a splint which is been in since that time. Date of onset:  June 4, 2019    Injury: Yes: Comment: Blunt trauma to thumb    Prior Treatment:  Yes: Comment: Emergency department splint    Numbness/ Tingling: No    ROS: Review of Systems - General ROS: negative  Respiratory ROS: no cough, shortness of breath, or wheezing  Cardiovascular ROS: no chest pain or dyspnea on exertion  Musculoskeletal ROS: positive for - pain in thumb - left  Neurological ROS: negative  Dermatological ROS: negative    Past Medical History:   Diagnosis Date    Arthritis     Dr. 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 Yari Negron polyp     Dr. Sybil Whitten 2009; Dr Fneg Arteaga 9/7/18    Detrusor and sphincter dyssynergia     Dr. Deb Sosa in past    Disc disease, degenerative, thoracic or thoracolumbar     MRI    Dyslipidemia     FHx: colon cancer     Frozen shoulder syndrome 2013    Dr. Adams Milton GERD (gastroesophageal reflux disease)     HSV (herpes simplex virus) infection     Overweight (BMI 25.0-29. 9)     IF 3/19 start weight 165 lbs    Syncope     Dr. Zacarias Torres 2009 neg holter and w/u       Past Surgical History:   Procedure Laterality Date    CHEST SURGERY PROCEDURE UNLISTED  7/14    CT chest neg for nodule    Terrye Homans  2012     Franc Carvajal HX COLONOSCOPY      Dr Migdalia Owen 2009 polyp; 2013; Dr Izquierdo Heads 9/7/18 polyp        Current Outpatient Medications   Medication Sig Dispense Refill    gabapentin (NEURONTIN) 600 mg tablet Take 1 Tab by mouth two (2) times a day. 270 Tab 3    triamcinolone (ARISTOCORT) 0.5 % topical cream Apply  to affected area two (2) times a day. use thin layer 30 g 1    atorvastatin (LIPITOR) 10 mg tablet Take 1 Tab by mouth daily. 90 Tab 3    multivitamin (ONE A DAY) tablet Take 1 Tab by mouth daily. Allergies   Allergen Reactions    Flomax [Tamsulosin] Vertigo    Uroxatral [Alfuzosin] Vertigo     And fainting         PE:     left thumb: There is minimal tenderness to palpation about the thumb MP PIP and CMC joints. There is no gross instability or ligamentous laxity to the MCP joint ulnar or radial collateral ligaments. There is no pain with CMC grind or palpation of the CMC joint. There is mild ecchymosis at the level of the thumb metacarpal and mild edema about the thumb. Imaging:    Plain films of the left thumb from June 5, 2019 does not show any acute fracture or dislocation and only mild arthritic changes. ICD-10-CM ICD-9-CM    1. Contusion of left thumb without damage to nail, initial encounter S60.012A 923.3        Plan:     I have instructed the patient he no longer needs a splint and he should ice elevate and take anti-inflammatories as needed.     Follow-up PRN    Plan was reviewed with patient, who verbalized agreement and understanding of the plan

## 2019-08-28 DIAGNOSIS — E78.5 DYSLIPIDEMIA: ICD-10-CM

## 2019-08-28 NOTE — TELEPHONE ENCOUNTER
PCP: Jamil Harvey MD    Last appt: 3/11/2019  Future Appointments   Date Time Provider Patrica Grider   3/9/2020  7:55 AM IO NURSE VISIT [address]   3/16/2020  1:00 PM Jamil Harvey MD [facility] STEVE SCHED       Requested Prescriptions     Pending Prescriptions Disp Refills    atorvastatin (LIPITOR) 10 mg tablet 90 Tab 3     Sig: Take 1 Tab by mouth daily.

## 2019-08-29 RX ORDER — ATORVASTATIN CALCIUM 10 MG/1
10 TABLET, FILM COATED ORAL DAILY
Qty: 90 TAB | Refills: 3 | Status: SHIPPED | OUTPATIENT
Start: 2019-08-29 | End: 2020-09-13

## 2019-10-30 DIAGNOSIS — M15.9 PRIMARY OSTEOARTHRITIS INVOLVING MULTIPLE JOINTS: Primary | ICD-10-CM

## 2019-10-31 RX ORDER — GABAPENTIN 600 MG/1
600 TABLET ORAL 2 TIMES DAILY
Qty: 270 TAB | Refills: 3 | Status: SHIPPED | OUTPATIENT
Start: 2019-10-31 | End: 2020-06-04

## 2020-03-11 ENCOUNTER — APPOINTMENT (OUTPATIENT)
Dept: INTERNAL MEDICINE CLINIC | Age: 70
End: 2020-03-11

## 2020-03-11 ENCOUNTER — HOSPITAL ENCOUNTER (OUTPATIENT)
Dept: LAB | Age: 70
Discharge: HOME | End: 2020-03-11
Payer: MEDICARE

## 2020-03-11 DIAGNOSIS — Z12.5 SPECIAL SCREENING FOR MALIGNANT NEOPLASM OF PROSTATE: ICD-10-CM

## 2020-03-11 DIAGNOSIS — E78.5 DYSLIPIDEMIA: ICD-10-CM

## 2020-03-11 LAB
ALBUMIN SERPL-MCNC: 4.3 G/DL (ref 3.4–5)
ALBUMIN/GLOB SERPL: 1.5 {RATIO} (ref 0.8–1.7)
ALP SERPL-CCNC: 51 U/L (ref 45–117)
ALT SERPL-CCNC: 27 U/L (ref 16–61)
ANION GAP SERPL CALC-SCNC: 5 MMOL/L (ref 3–18)
AST SERPL-CCNC: 22 U/L (ref 10–38)
BILIRUB SERPL-MCNC: 1 MG/DL (ref 0.2–1)
BUN SERPL-MCNC: 16 MG/DL (ref 7–18)
BUN/CREAT SERPL: 21 (ref 12–20)
CALCIUM SERPL-MCNC: 9.2 MG/DL (ref 8.5–10.1)
CHLORIDE SERPL-SCNC: 108 MMOL/L (ref 100–111)
CHOLEST SERPL-MCNC: 138 MG/DL
CO2 SERPL-SCNC: 30 MMOL/L (ref 21–32)
CREAT SERPL-MCNC: 0.76 MG/DL (ref 0.6–1.3)
ERYTHROCYTE [DISTWIDTH] IN BLOOD BY AUTOMATED COUNT: 13.7 % (ref 11.6–14.5)
GLOBULIN SER CALC-MCNC: 2.8 G/DL (ref 2–4)
GLUCOSE SERPL-MCNC: 80 MG/DL (ref 74–99)
HCT VFR BLD AUTO: 44 % (ref 36–48)
HDLC SERPL-MCNC: 46 MG/DL (ref 40–60)
HDLC SERPL: 3 {RATIO} (ref 0–5)
HGB BLD-MCNC: 14.4 G/DL (ref 13–16)
LDLC SERPL CALC-MCNC: 75.8 MG/DL (ref 0–100)
LIPID PROFILE,FLP: NORMAL
MCH RBC QN AUTO: 30.7 PG (ref 24–34)
MCHC RBC AUTO-ENTMCNC: 32.7 G/DL (ref 31–37)
MCV RBC AUTO: 93.8 FL (ref 74–97)
PLATELET # BLD AUTO: 246 K/UL (ref 135–420)
PMV BLD AUTO: 10 FL (ref 9.2–11.8)
POTASSIUM SERPL-SCNC: 4.3 MMOL/L (ref 3.5–5.5)
PROT SERPL-MCNC: 7.1 G/DL (ref 6.4–8.2)
PSA SERPL-MCNC: 6.7 NG/ML (ref 0–4)
RBC # BLD AUTO: 4.69 M/UL (ref 4.7–5.5)
SODIUM SERPL-SCNC: 143 MMOL/L (ref 136–145)
TRIGL SERPL-MCNC: 81 MG/DL (ref ?–150)
VLDLC SERPL CALC-MCNC: 16.2 MG/DL
WBC # BLD AUTO: 5.4 K/UL (ref 4.6–13.2)

## 2020-03-11 PROCEDURE — 85027 COMPLETE CBC AUTOMATED: CPT

## 2020-03-11 PROCEDURE — 36415 COLL VENOUS BLD VENIPUNCTURE: CPT

## 2020-03-11 PROCEDURE — 84153 ASSAY OF PSA TOTAL: CPT

## 2020-03-11 PROCEDURE — 80053 COMPREHEN METABOLIC PANEL: CPT

## 2020-03-11 PROCEDURE — 80061 LIPID PANEL: CPT

## 2020-03-12 NOTE — PROGRESS NOTES
This is a Subsequent Medicare Annual Wellness Visit     I have reviewed the patient's medical history in detail and updated the computerized patient record. History     Past Medical History:   Diagnosis Date    Arthritis     Dr. Read Fall hips/knees/hands    BPH with obstruction/lower urinary tract symptoms 2/23/2017    Cervical radiculopathy 2012    Dr. Jimenez Link polyp     Dr. Janusz Garza 2009; Dr Payal Obrien 9/7/18    Detrusor and sphincter dyssynergia     Dr. Vinod Gray in past    Disc disease, degenerative, thoracic or thoracolumbar     MRI    Dyslipidemia     FHx: colon cancer     Frozen shoulder syndrome 2013    Dr. Matt Lee GERD (gastroesophageal reflux disease)     HSV (herpes simplex virus) infection     Overweight (BMI 25.0-29. 9)     IF 3/19 start weight 165 lbs    Syncope     Dr. Pb Kulkarni 2009 neg holter and w/u      Past Surgical History:   Procedure Laterality Date    CHEST SURGERY PROCEDURE UNLISTED  7/14    CT chest neg for nodule    HX CATARACT REMOVAL  2012    Dr. Kuldeep Garza 2009 polyp; 2013; Dr Payal Obrien 9/7/18 polyp      Current Outpatient Medications   Medication Sig Dispense Refill    omeprazole (PRILOSEC) 40 mg capsule Take 1 Cap by mouth daily. 90 Cap 3    gabapentin (NEURONTIN) 600 mg tablet Take 1 Tab by mouth two (2) times a day. 270 Tab 3    atorvastatin (LIPITOR) 10 mg tablet Take 1 Tab by mouth daily. 90 Tab 3    triamcinolone (ARISTOCORT) 0.5 % topical cream Apply  to affected area two (2) times a day. use thin layer 30 g 1    multivitamin (ONE A DAY) tablet Take 1 Tab by mouth daily.        Allergies   Allergen Reactions    Flomax [Tamsulosin] Vertigo    Uroxatral [Alfuzosin] Vertigo     And fainting     Family History   Problem Relation Age of Onset    Cancer Mother         colon    Cancer Sister         breast     Social History     Tobacco Use    Smoking status: Never Smoker    Smokeless tobacco: Never Used   Substance Use Topics    Alcohol use: No     Depression Risk Factor Screening:     3 most recent PHQ Screens 3/16/2020   Little interest or pleasure in doing things Not at all   Feeling down, depressed, irritable, or hopeless Not at all   Total Score PHQ 2 0     SCREENINGS  Colonoscopy last done 9/18 Dr Maria Elena Marion  Prostate ODALYS/PSA done 3/18    Immunization History   Administered Date(s) Administered    Influenza High Dose Vaccine PF 10/15/2015, 11/14/2016, 10/03/2017, 09/27/2018, 10/22/2019    Influenza Vaccine 11/10/2014    Pneumococcal Conjugate (PCV-13) 02/11/2016    Pneumococcal Polysaccharide (PPSV-23) 02/05/2015    Td 01/01/2010    Tdap 02/19/2017    Zoster Recombinant 03/14/2019, 08/14/2019    Zoster Vaccine, Live 01/01/2010     Alcohol Risk Factor Screening: On any occasion during the past 3 months, have you had more than 4 drinks containing alcohol? No    Do you average more than 14 drinks per week? No      Functional Ability and Level of Safety:     Hearing Loss   normal-to-mild    Vision - no acute complaints and is followed by Dr Karen Pedro of [facility]   Self-care. Requires assistance with: no ADLs    Fall Risk     Fall Risk Assessment, last 12 mths 3/16/2020   Able to walk? Yes   Fall in past 12 months? No   Fall with injury? -   Number of falls in past 12 months -   Fall Risk Score -     Abuse Screen   Patient is not abused    Review of Systems   A comprehensive review of systems was negative except for that written in the HPI.     Physical Examination     Evaluation of Cognitive Function:  Mood/affect:  neutral  Appearance: age appropriate, well dressed and within normal Limits  Family member/caregiver input: na    Patient Care Team:  Nhung Butler MD as PCP - General (Internal Medicine)  Nhung Butler MD as PCP - [facility] Empaneled Provider  Souleymane Farrell, RN as [facility] (Internal Medicine)  Bee Hernandez MD (Orthopedic Surgery)    Advice/Referrals/Counseling   Education and counseling provided:  Are appropriate based on today's review and evaluation  End-of-Life planning (with patient's consent)  Pneumococcal Vaccine  Influenza Vaccine  Prostate cancer screening tests (PSA, covered annually)  Colorectal cancer screening tests  Cardiovascular screening blood test  Diabetes screening test    Assessment/Plan       ICD-10-CM ICD-9-CM    1. Medicare annual wellness visit, subsequent Z00.00 V70.0    2. Dysphagia, unspecified type R13.10 787.20 REFERRAL TO GASTROENTEROLOGY   3. Gastroesophageal reflux disease, esophagitis presence not specified K21.9 530.81 REFERRAL TO GASTROENTEROLOGY      omeprazole (PRILOSEC) 40 mg capsule   4. Elevated PSA R97.20 790.93 REFERRAL TO UROLOGY   5. BPH with obstruction/lower urinary tract symptoms N40.1 600.01 REFERRAL TO UROLOGY    N13.8 599.69    6. Skin lesion L98.9 709.9 REFERRAL TO DERMATOLOGY   7. Hyperplastic colonic polyp, unspecified part of colon K63.5 211.3    8. Dyslipidemia E78.5 272.4    9. Primary osteoarthritis involving multiple joints M15.0 715.09    10. Advanced directives, counseling/discussion Z71.89 V65.49 ADVANCE CARE PLANNING FIRST 30 MINS   11. Screening for alcoholism Z13.39 V79.1 HI ANNUAL ALCOHOL SCREEN 15 MIN   12. Screening for depression Z13.31 V79.0 [facility]   13. Screening for diabetes mellitus Z13.1 V77.1      current treatment plan is effective  lab results and schedule of future lab studies reviewed with patient  cardiovascular risk and specific lipid/LDL goals reviewed. End of life discussion undertaken.   He will work on getting medical directive in place  Colonoscopy to be scheduled per Dr Payal Obrien 2023, fiber

## 2020-03-12 NOTE — PROGRESS NOTES
78 y.o. white male who presents for evaluation    Reports no cardiovascular complaints, his biking is down to 1/week mainly due to weather and motivational issues but can still go>15 miles at a time    He has been having breakthrough gerd even on pepcid type meds. He'd been on ppi in the past.  Admits that he's not doing great with avoidance measures and reports occasional pills having to be pushed down by extra water when he swallows    He has LUTS sx but is concerned about his elev psa. No dysuria, hematuria, flank pain. He's been using some testosterone supp from [facility] the last 6 mos    He has a vague sensation in the left upper chest but only when lying down, never exertional, no associated cough, sob, pleurisy, etc    The spine issues are doing ok and he's wondering if we can start weaning down the kenneth    LAST MEDICARE WELLNESS EXAM: 2/5/15, 2/12/16, 2/23/17, 3/1/18, 3/11/19, 3/16/20    Past Medical History:   Diagnosis Date    Arthritis     Dr. Anna Godfrey hips/knees/hands    BPH with obstruction/lower urinary tract symptoms 2/23/2017    Cervical radiculopathy 2012    Dr. Melisa Bae polyp     Dr. Kourtney Darden 2009; Dr Matias Trivedi 9/7/18    Detrusor and sphincter dyssynergia     Dr. Gómez Jay in past    Disc disease, degenerative, thoracic or thoracolumbar     MRI    Dyslipidemia     FHx: colon cancer     Frozen shoulder syndrome 2013    Dr. Amisha Best GERD (gastroesophageal reflux disease)     HSV (herpes simplex virus) infection     Overweight (BMI 25.0-29. 9)     IF 3/19 start weight 165 lbs    Syncope     Dr. Zachariah Rios 2009 neg holter and w/u     Past Surgical History:   Procedure Laterality Date    CHEST SURGERY PROCEDURE UNLISTED  7/14    CT chest neg for nodule    HX CATARACT REMOVAL  2012    Dr. Melanie Darden 2009 polyp; 2013; Dr Matias Trivedi 9/7/18 polyp      Social History     Socioeconomic History    Marital status:      Spouse name: Not on file    Number of children: Not on file    Years of education: Not on file    Highest education level: Not on file   Occupational History    Occupation: ret , owns painting [employer]   Social Needs    Financial resource strain: Not on file    Food insecurity     Worry: Not on file     Inability: Not on file   [employer] needs     Medical: Not on file     Non-medical: Not on file   Tobacco Use    Smoking status: Never Smoker    Smokeless tobacco: Never Used   Substance and Sexual Activity    Alcohol use: No    Drug use: No    Sexual activity: Not on file   Lifestyle    Physical activity     Days per week: Not on file     Minutes per session: Not on file    Stress: Not on file   Relationships    Social connections     Talks on phone: Not on file     Gets together: Not on file     Attends Worship service: Not on file     Active member of club or organization: Not on file     Attends meetings of clubs or organizations: Not on file     Relationship status: Not on file    Intimate partner violence     Fear of current or ex partner: Not on file     Emotionally abused: Not on file     Physically abused: Not on file     Forced sexual activity: Not on file   Other Topics Concern    Not on file   Social History Narrative    Not on file     Allergies   Allergen Reactions    Flomax [Tamsulosin] Vertigo    Uroxatral [Alfuzosin] Vertigo     And fainting      Current Outpatient Medications   Medication Sig    omeprazole (PRILOSEC) 40 mg capsule Take 1 Cap by mouth daily.  gabapentin (NEURONTIN) 600 mg tablet Take 1 Tab by mouth two (2) times a day.  atorvastatin (LIPITOR) 10 mg tablet Take 1 Tab by mouth daily.  triamcinolone (ARISTOCORT) 0.5 % topical cream Apply  to affected area two (2) times a day. use thin layer    multivitamin (ONE A DAY) tablet Take 1 Tab by mouth daily. No current facility-administered medications for this visit.       REVIEW OF SYSTEMS: colo 9/18 Dr Shirley Ortega, sees Dr Dayna Hayward - no vision change or eye pain  Oral - no mouth pain, tongue or tooth problems  Ears - no hearing loss, ear pain, fullness, no swallowing problems  Cardiac - no CP, PND, orthopnea, edema, palpitations or syncope  Chest - no breast masses  Resp - no wheezing, chronic coughing, dyspnea  GI - no heartburn, nausea, vomiting, change in bowel habits, bleeding, hemorrhoids  Urinary - no dysuria, hematuria, flank pain, urgency, frequency    Visit Vitals  /73 (BP 1 Location: Right arm, BP Patient Position: Sitting)   Pulse 62   Temp 97.3 °F (36.3 °C) (Oral)   Resp 16   Ht 5' 7.76\" (1.721 m)   Wt 160 lb (72.6 kg)   SpO2 98%   BMI 24.50 kg/m²   Affect is appropriate. Mood stable  No apparent distress  HEENT --Anicteric sclerae, tympanic membranes normal,  ear canals normal.  PERRL, EOMI, conjunctiva and lids normal.  Disks were sharp  Sinuses were nontender, turbinates normal, hearing normal.  Oropharynx without  erythema, normal tongue, oral mucosa and tonsils. No thyromegaly, JVD, or bruits. Lungs --Clear to auscultation, normal percussion. Heart --Regular rate and rhythm, no murmurs, rubs, gallops, or clicks. Chest wall --Nontender to palpation. PMI normal.  gu and rectal deferred as he will be seeing urology   Abdomen -- Soft and nontender, no hepatosplenomegaly or masses.   Ext without c/c/e    LABS  From 2/11 showed gluc 85, cr 0.80,             alt 23,                   chol 182, tg 123, hdl 37, ldl-c 120, wbc 4.5, hb 13.8, plt 257, psa 1.80       From 2/12 showed gluc 84, cr 0.70,             alt 19, ldl-p 1679                                                         wbc 4.0, hb 13.1, plt 266, psa 2.03  From 3/13 showed gluc 86, cr 0.80,             alt 19, ldl-p 1779, chol 171, tg 93,   hdl 42, ldl-c 110, tsh 0.85  From 9/13 showed                        ldl-p 1809, chol 192, tg 92,   hdl 49, ldl-c 125  From 1/14 showed gluc 84, cr 0.90, gfr>60, alt 41, ldl-p 1062, chol 131, tg 120, hdl 41, ldl-c 66,   tsh 0.85  From 6/14 showed gluc 83, cr 0.80, gfr>60,                   wbc 4.5, hb 14.0, plt 231  From 2/15 showed           chol 123, tg 79,   hdl 42, ldl-c 66,          psa 2.78  From 2/16 showed gluc 87, cr 0.85, gfr>60, alt 33,      chol 121, tg 103, hdl 34, ldl-c 66,   wbc 4.7, hb 14.2, plt 281, psa 3.20  From 2/17 showed gluc 85, cr 0.85, gfr>60, alt 32,       chol 127, tg 63,   hdl 54, ldl-c 60,   wbc 4.3, hb 14.9, plt 247, hep c neg  From 12/17 showed glu 96, cr 0.70, gfr>60,                   wbc 4.7, hb 14.1, plt 255  From 3/19 showed gluc 84, cr 0.81, gfr>60, alt 27,       chol 124, tg 77,   hdl 42, ldl-c 67,   wbc 4.3, hb 14.3, plt 253    Results for orders placed or performed during the hospital encounter of 03/11/20   CBC W/O DIFF   Result Value Ref Range    WBC 5.4 4.6 - 13.2 K/uL    RBC 4.69 (L) 4.70 - 5.50 M/uL    HGB 14.4 13.0 - 16.0 g/dL    HCT 44.0 36.0 - 48.0 %    MCV 93.8 74.0 - 97.0 FL    MCH 30.7 24.0 - 34.0 PG    MCHC 32.7 31.0 - 37.0 g/dL    RDW 13.7 11.6 - 14.5 %    PLATELET 641 912 - 502 K/uL    MPV 10.0 9.2 - 11.8 FL   LIPID PANEL   Result Value Ref Range    LIPID PROFILE          Cholesterol, total 138 <200 MG/DL    Triglyceride 81 <150 MG/DL    HDL Cholesterol 46 40 - 60 MG/DL    LDL, calculated 75.8 0 - 100 MG/DL    VLDL, calculated 16.2 MG/DL    CHOL/HDL Ratio 3.0 0 - 5.0     METABOLIC PANEL, COMPREHENSIVE   Result Value Ref Range    Sodium 143 136 - 145 mmol/L    Potassium 4.3 3.5 - 5.5 mmol/L    Chloride 108 100 - 111 mmol/L    CO2 30 21 - 32 mmol/L    Anion gap 5 3.0 - 18 mmol/L    Glucose 80 74 - 99 mg/dL    BUN 16 7.0 - 18 MG/DL    Creatinine 0.76 0.6 - 1.3 MG/DL    BUN/Creatinine ratio 21 (H) 12 - 20      GFR est AA >60 >60 ml/min/1.73m2    GFR est non-AA >60 >60 ml/min/1.73m2    Calcium 9.2 8.5 - 10.1 MG/DL    Bilirubin, total 1.0 0.2 - 1.0 MG/DL    ALT (SGPT) 27 16 - 61 U/L    AST (SGOT) 22 10 - 38 U/L    Alk.  phosphatase 51 45 - 117 U/L    Protein, total 7.1 6.4 - 8.2 g/dL Albumin 4.3 3.4 - 5.0 g/dL    Globulin 2.8 2.0 - 4.0 g/dL    A-G Ratio 1.5 0.8 - 1.7     PSA SCREENING (SCREENING)   Result Value Ref Range    Prostate Specific Ag 6.7 (H) 0.0 - 4.0 ng/mL     Patient Active Problem List   Diagnosis Code    Colon polyp Dr. Derick Trejo  K63.5    Dyslipidemia E78.5    Arthritis, degenerative Dr Dai Sosa M19.90    BPH with obstruction/lower urinary tract symptoms N40.1, N13.8     Assessment and plan:  1. GERD. Avoidance measures reiterated. Change to prilosec from [facility] h2b. Will send to Dr Matthew Barrios group  2. Dyslipidemia. Continue lipitor  3. Colon polyp and FH colon ca. Fiber, f/u 2023  4. BPH/LUTS. Will send to urology  5. Overweight. Doing better  6. Derm. He requested referral for full body exam  7. Elevated psa. Long discussion,  Will send to Dr Laura Conroy group for further eval  8. Spine. Instructed him on how to wean down the kenneth        RTC 3/21    Above conditions discussed at length and patient vocalized understanding.   All questions answered to patient satisfaction      TOTAL time 40 min spent of which at least 30 min was on counseling, answering questions and coordination of care

## 2020-03-16 ENCOUNTER — OFFICE VISIT (OUTPATIENT)
Dept: INTERNAL MEDICINE CLINIC | Age: 70
End: 2020-03-16

## 2020-03-16 VITALS
BODY MASS INDEX: 24.25 KG/M2 | OXYGEN SATURATION: 98 % | RESPIRATION RATE: 16 BRPM | SYSTOLIC BLOOD PRESSURE: 136 MMHG | HEIGHT: 68 IN | DIASTOLIC BLOOD PRESSURE: 73 MMHG | WEIGHT: 160 LBS | HEART RATE: 62 BPM | TEMPERATURE: 97.3 F

## 2020-03-16 DIAGNOSIS — Z13.1 SCREENING FOR DIABETES MELLITUS: ICD-10-CM

## 2020-03-16 DIAGNOSIS — L98.9 SKIN LESION: ICD-10-CM

## 2020-03-16 DIAGNOSIS — M15.9 PRIMARY OSTEOARTHRITIS INVOLVING MULTIPLE JOINTS: ICD-10-CM

## 2020-03-16 DIAGNOSIS — Z13.39 SCREENING FOR ALCOHOLISM: ICD-10-CM

## 2020-03-16 DIAGNOSIS — N40.1 BPH WITH OBSTRUCTION/LOWER URINARY TRACT SYMPTOMS: ICD-10-CM

## 2020-03-16 DIAGNOSIS — E78.5 DYSLIPIDEMIA: ICD-10-CM

## 2020-03-16 DIAGNOSIS — K63.5 HYPERPLASTIC COLONIC POLYP, UNSPECIFIED PART OF COLON: ICD-10-CM

## 2020-03-16 DIAGNOSIS — Z13.31 SCREENING FOR DEPRESSION: ICD-10-CM

## 2020-03-16 DIAGNOSIS — N13.8 BPH WITH OBSTRUCTION/LOWER URINARY TRACT SYMPTOMS: ICD-10-CM

## 2020-03-16 DIAGNOSIS — Z00.00 MEDICARE ANNUAL WELLNESS VISIT, SUBSEQUENT: Primary | ICD-10-CM

## 2020-03-16 DIAGNOSIS — K21.9 GASTROESOPHAGEAL REFLUX DISEASE, ESOPHAGITIS PRESENCE NOT SPECIFIED: ICD-10-CM

## 2020-03-16 DIAGNOSIS — R13.10 DYSPHAGIA, UNSPECIFIED TYPE: ICD-10-CM

## 2020-03-16 DIAGNOSIS — Z71.89 ADVANCED DIRECTIVES, COUNSELING/DISCUSSION: ICD-10-CM

## 2020-03-16 DIAGNOSIS — R97.20 ELEVATED PSA: ICD-10-CM

## 2020-03-16 PROBLEM — E66.3 OVERWEIGHT (BMI 25.0-29.9): Status: RESOLVED | Noted: 2019-03-11 | Resolved: 2020-03-16

## 2020-03-16 RX ORDER — OMEPRAZOLE 40 MG/1
40 CAPSULE, DELAYED RELEASE ORAL DAILY
Qty: 90 CAP | Refills: 3 | Status: SHIPPED | OUTPATIENT
Start: 2020-03-16 | End: 2021-07-12 | Stop reason: SDUPTHER

## 2020-03-16 NOTE — PROGRESS NOTES
Paul Frausto presents today for   Chief Complaint   Patient presents with    Annual Wellness Visit              Depression Screening:  3 most recent PHQ Screens 3/16/2020   Little interest or pleasure in doing things Not at all   Feeling down, depressed, irritable, or hopeless Not at all   Total Score PHQ 2 0       Learning Assessment:  Learning Assessment 3/11/2019   PRIMARY LEARNER Patient   HIGHEST LEVEL OF EDUCATION - PRIMARY LEARNER  GRADUATED HIGH SCHOOL OR GED   BARRIERS PRIMARY LEARNER NONE   CO-LEARNER CAREGIVER No   PRIMARY LANGUAGE ENGLISH   LEARNER PREFERENCE PRIMARY DEMONSTRATION   ANSWERED BY patient   RELATIONSHIP SELF       Abuse Screening:  Abuse Screening Questionnaire 3/11/2019   Do you ever feel afraid of your partner? N   Are you in a relationship with someone who physically or mentally threatens you? N   Is it safe for you to go home? Y       Fall Risk  Fall Risk Assessment, last 12 mths 3/16/2020   Able to walk? Yes   Fall in past 12 months? No   Fall with injury? -   Number of falls in past 12 months -   Fall Risk Score -           Coordination of Care:  1. Have you been to the ER, urgent care clinic since your last visit? Hospitalized since your last visit? no    2. Have you seen or consulted any other health care providers outside of the [address] [place] [address] since your last visit? Include any pap smears or colon screening.  no

## 2020-03-16 NOTE — PATIENT INSTRUCTIONS
Medicare Wellness Visit, Male    The best way to live healthy is to have a lifestyle where you eat a well-balanced diet, exercise regularly, limit alcohol use, and quit all forms of tobacco/nicotine, if applicable. Regular preventive services are another way to keep healthy. Preventive services (vaccines, screening tests, monitoring & exams) can help personalize your care plan, which helps you manage your own care. Screening tests can find health problems at the earliest stages, when they are easiest to treat. [facility] follows the current, evidence-based guidelines published by the [facility] ([facility]) when recommending preventive services for our patients. Because we follow these guidelines, sometimes recommendations change over time as research supports it. (For example, a prostate screening blood test is no longer routinely recommended for men with no symptoms). Of course, you and your doctor may decide to screen more often for some diseases, based on your risk and co-morbidities (chronic disease you are already diagnosed with). Preventive services for you include:  - Medicare offers their members a free annual wellness visit, which is time for you and your primary care provider to discuss and plan for your preventive service needs. Take advantage of this benefit every year!  -All adults over age 72 should receive the recommended pneumonia vaccines. Current USPSTF guidelines recommend a series of two vaccines for the best pneumonia protection.   -All adults should have a flu vaccine yearly and tetanus vaccine every 10 years.  -All adults age 48 and older should receive the shingles vaccines (series of two vaccines).        -All adults age 38-68 who are overweight should have a diabetes screening test once every three years.   -Other screening tests & preventive services for persons with diabetes include: an eye exam to screen for diabetic retinopathy, a kidney function test, a foot exam, and stricter control over your cholesterol.   -Cardiovascular screening for adults with routine risk involves an electrocardiogram (ECG) at intervals determined by the provider.   -Colorectal cancer screening should be done for adults age 54-65 with no increased risk factors for colorectal cancer. There are a number of acceptable methods of screening for this type of cancer. Each test has its own benefits and drawbacks. Discuss with your provider what is most appropriate for you during your annual wellness visit. The different tests include: colonoscopy (considered the best screening method), a fecal occult blood test, a fecal DNA test, and sigmoidoscopy.  -All adults born between [facility] should be screened once for Hepatitis C.  -An Abdominal Aortic Aneurysm (AAA) Screening is recommended for men age 73-68 who has ever smoked in their lifetime.      Here is a list of your current Health Maintenance items (your personalized list of preventive services) with a due date:  Health Maintenance Due   Topic Date Due    Glaucoma Screening   02/18/2019    Annual Well Visit  03/11/2020

## 2020-03-16 NOTE — ACP (ADVANCE CARE PLANNING)
Advance Care Planning    Advance Care Planning (ACP) Provider Note - Comprehensive     Date of ACP Conversation: 03/16/20  Persons included in Conversation:  patient  Length of ACP Conversation in minutes:  16 minutes    Authorized Decision Maker (if patient is incapable of making informed decisions): This person is: daughter Lianet An Agent/Medical Power of  under Advance Directive          General ACP for ALL Patients with Decision Making Capacity:   Importance of advance care planning, including choosing a healthcare agent to communicate patient's healthcare decisions if patient lost the ability to make decisions, such as after a sudden illness or accident  Understanding of the healthcare agent role was assessed and information provided    Review of Existing Advance Directive:  Does this advance directive still reflect your preferences?   Yes (Provide new form/Refer for assistance in updating)    For Serious or Chronic Illness:  Understanding of medical condition      Interventions Provided:  Recommended communicating the plan and making copies for the healthcare agent, personal physician, and others as appropriate (e.g., health system)  Recommended review of completed ACP document annually or upon change in health status

## 2020-06-23 ENCOUNTER — ANESTHESIA (OUTPATIENT)
Dept: ENDOSCOPY | Age: 70
End: 2020-06-23
Payer: MEDICARE

## 2020-06-23 ENCOUNTER — HOSPITAL ENCOUNTER (OUTPATIENT)
Age: 70
Setting detail: OUTPATIENT SURGERY
Discharge: HOME | End: 2020-06-23
Attending: INTERNAL MEDICINE | Admitting: INTERNAL MEDICINE
Payer: MEDICARE

## 2020-06-23 ENCOUNTER — ANESTHESIA EVENT (OUTPATIENT)
Dept: ENDOSCOPY | Age: 70
End: 2020-06-23
Payer: MEDICARE

## 2020-06-23 VITALS
WEIGHT: 155 LBS | SYSTOLIC BLOOD PRESSURE: 109 MMHG | HEIGHT: 68 IN | DIASTOLIC BLOOD PRESSURE: 71 MMHG | OXYGEN SATURATION: 100 % | BODY MASS INDEX: 23.49 KG/M2 | HEART RATE: 57 BPM | RESPIRATION RATE: 16 BRPM | TEMPERATURE: 98 F

## 2020-06-23 PROCEDURE — 74011250636 HC RX REV CODE- 250/636: Performed by: ANESTHESIOLOGY

## 2020-06-23 PROCEDURE — 76040000019: Performed by: INTERNAL MEDICINE

## 2020-06-23 PROCEDURE — 77030021593 HC FCPS BIOP ENDOSC BSC -A: Performed by: INTERNAL MEDICINE

## 2020-06-23 PROCEDURE — 74011250636 HC RX REV CODE- 250/636: Performed by: NURSE ANESTHETIST, CERTIFIED REGISTERED

## 2020-06-23 PROCEDURE — 88305 TISSUE EXAM BY PATHOLOGIST: CPT

## 2020-06-23 PROCEDURE — 76060000031 HC ANESTHESIA FIRST 0.5 HR: Performed by: INTERNAL MEDICINE

## 2020-06-23 PROCEDURE — 74011000250 HC RX REV CODE- 250: Performed by: ANESTHESIOLOGY

## 2020-06-23 RX ORDER — PROPOFOL 10 MG/ML
INJECTION, EMULSION INTRAVENOUS AS NEEDED
Status: DISCONTINUED | OUTPATIENT
Start: 2020-06-23 | End: 2020-06-23 | Stop reason: HOSPADM

## 2020-06-23 RX ORDER — LIDOCAINE HYDROCHLORIDE 20 MG/ML
INJECTION, SOLUTION EPIDURAL; INFILTRATION; INTRACAUDAL; PERINEURAL AS NEEDED
Status: DISCONTINUED | OUTPATIENT
Start: 2020-06-23 | End: 2020-06-23 | Stop reason: HOSPADM

## 2020-06-23 RX ORDER — SODIUM CHLORIDE 0.9 % (FLUSH) 0.9 %
5-40 SYRINGE (ML) INJECTION AS NEEDED
Status: DISCONTINUED | OUTPATIENT
Start: 2020-06-23 | End: 2020-06-23 | Stop reason: HOSPADM

## 2020-06-23 RX ORDER — SODIUM CHLORIDE, SODIUM LACTATE, POTASSIUM CHLORIDE, CALCIUM CHLORIDE 600; 310; 30; 20 MG/100ML; MG/100ML; MG/100ML; MG/100ML
75 INJECTION, SOLUTION INTRAVENOUS CONTINUOUS
Status: DISCONTINUED | OUTPATIENT
Start: 2020-06-23 | End: 2020-06-23 | Stop reason: HOSPADM

## 2020-06-23 RX ORDER — SODIUM CHLORIDE 0.9 % (FLUSH) 0.9 %
5-40 SYRINGE (ML) INJECTION EVERY 8 HOURS
Status: DISCONTINUED | OUTPATIENT
Start: 2020-06-23 | End: 2020-06-23 | Stop reason: HOSPADM

## 2020-06-23 RX ADMIN — PROPOFOL 75 MG: 10 INJECTION, EMULSION INTRAVENOUS at 10:28

## 2020-06-23 RX ADMIN — FAMOTIDINE 20 MG: 10 INJECTION, SOLUTION INTRAVENOUS at 09:55

## 2020-06-23 RX ADMIN — LIDOCAINE HYDROCHLORIDE 100 MG: 20 INJECTION, SOLUTION EPIDURAL; INFILTRATION; INTRACAUDAL; PERINEURAL at 10:27

## 2020-06-23 RX ADMIN — PROPOFOL 50 MG: 10 INJECTION, EMULSION INTRAVENOUS at 10:31

## 2020-06-23 RX ADMIN — SODIUM CHLORIDE, SODIUM LACTATE, POTASSIUM CHLORIDE, AND CALCIUM CHLORIDE 75 ML/HR: 600; 310; 30; 20 INJECTION, SOLUTION INTRAVENOUS at 09:55

## 2020-06-23 NOTE — ANESTHESIA PREPROCEDURE EVALUATION
Relevant Problems   No relevant active problems       Anesthetic History               Review of Systems / Medical History  Patient summary reviewed and pertinent labs reviewed    Pulmonary                   Neuro/Psych              Cardiovascular                       GI/Hepatic/Renal     GERD           Endo/Other        Obesity and arthritis     Other Findings              Physical Exam    Airway  Mallampati: II  TM Distance: 4 - 6 cm  Neck ROM: normal range of motion   Mouth opening: Normal     Cardiovascular  Regular rate and rhythm,  S1 and S2 normal,  no murmur, click, rub, or gallop             Dental  No notable dental hx       Pulmonary  Breath sounds clear to auscultation               Abdominal  GI exam deferred       Other Findings            Anesthetic Plan    ASA: 2  Anesthesia type: MAC          Induction: Intravenous  Anesthetic plan and risks discussed with: Patient

## 2020-06-23 NOTE — ANESTHESIA POSTPROCEDURE EVALUATION
Procedure(s):  UPPER ENDOSCOPY / dilation / bipsy. MAC    Anesthesia Post Evaluation      Multimodal analgesia: multimodal analgesia used between 6 hours prior to anesthesia start to PACU discharge  Patient location during evaluation: PACU  Patient participation: complete - patient participated  Level of consciousness: awake and alert  Pain management: adequate  Airway patency: patent  Anesthetic complications: no  Cardiovascular status: acceptable  Respiratory status: acceptable  Hydration status: acceptable  Post anesthesia nausea and vomiting:  controlled  Final Post Anesthesia Temperature Assessment:  Normothermia (36.0-37.5 degrees C)      INITIAL Post-op Vital signs:   Vitals Value Taken Time   /71 6/23/2020 11:00 AM   Temp     Pulse 55 6/23/2020 11:02 AM   Resp 12 6/23/2020 11:02 AM   SpO2 100 % 6/23/2020 11:02 AM   Vitals shown include unvalidated device data.

## 2020-06-23 NOTE — DISCHARGE INSTRUCTIONS
Upper GI Endoscopy: What to Expect at [address] [place] [address]  After you have an endoscopy, you will stay at the hospital or clinic for 1 to 2 hours. This will allow the medicine to wear off. You will be able to go home after your doctor or nurse checks to make sure you are not having any problems. You may have to stay overnight if you had treatment during the test. You may have a sore throat for a day or two after the test.  This care sheet gives you a general idea about what to expect after the test.  How can you care for yourself at home? Activity  · Rest as much as you need to after you go home. · You should be able to go back to your usual activities the day after the test.  Diet  · Follow your doctor's directions for eating after the test.  · Drink plenty of fluids (unless your doctor has told you not to). Medications  · If you have a sore throat the day after the test, use an over-the-counter spray to numb your throat. Follow-up care is a key part of your treatment and safety. Be sure to make and go to all appointments, and call your doctor if you are having problems. It's also a good idea to know your test results and keep a list of the medicines you take. When should you call for help? Call 911 anytime you think you may need emergency care. For example, call if:  · You passed out (lost consciousness). · You cough up blood. · You vomit blood or what looks like coffee grounds. · You pass maroon or very bloody stools. Call your doctor now or seek immediate medical care if:  · You have trouble swallowing. · You have belly pain. · Your stools are black and tarlike or have streaks of blood. · You are sick to your stomach or cannot keep fluids down. Watch closely for changes in your health, and be sure to contact your doctor if:  · Your throat still hurts after a day or two. · You do not get better as expected. Where can you learn more?    Go to [employer]  Enter J454 in the search box to learn more about \"Upper GI Endoscopy: What to Expect at Home. \"   © 9619-1319 [employer], [employer]orporated. Care instructions adapted under license by [facility] (which disclaims liability or warranty for this information). This care instruction is for use with your licensed healthcare professional. If you have questions about a medical condition or this instruction, always ask your healthcare professional. [address] any warranty or liability for your use of this information. Content Version: 86.1.362591; Current as of: November 14, 2014    DISCHARGE SUMMARY from Nurse     POST-PROCEDURE INSTRUCTIONS:    Call your Physician if you:  ? Observe any excess bleeding. ? Develop a temperature over 100.5o F.  ? Experience abdominal, shoulder or chest pain. ? Notice any signs of decreased circulation or nerve impairment to an extremity such as a change in color, persistent numbness, tingling, coldness or increase in pain. ? Vomit blood or you have nausea and vomiting lasting longer than 4 hours. ? Are unable to take medications. ? Are unable to urinate within 8 hours after discharge following general anesthesia or intravenous sedation. For the next 24 hours after receiving general anesthesia or intravenous sedation, or while taking prescription Narcotics, limit your activities:  ? Do NOT drive a motor vehicle, operate hazard machinery or power tools, or perform tasks that require coordination. The medication you received during your procedure may have some effect on your mental awareness. ? Do NOT make important personal or business decisions. The medication you received during your procedure may have some effect on your mental awareness. ? Do NOT drink alcoholic beverages. These drinks do not mix well with the medications that have been given to you. ? Upon discharge from the hospital, you must be accompanied by a responsible adult. ?  Resume your diet as directed by your physician. ? Resume medications as your physician has prescribed. ? Please give a list of your current medications to your Primary Care Provider. ? Please update this list whenever your medications are discontinued, doses are changed, or new medications (including over-the-counter products) are added. ? Please carry medication information at all times in case of emergency situations. These are general instructions for a healthy lifestyle:    No smoking/ No tobacco products/ Avoid exposure to second hand smoke.  Surgeon General's Warning:  Quitting smoking now greatly reduces serious risk to your health. Obesity, smoking, and a sedentary lifestyle greatly increase your risk for illness.  A healthy diet, regular physical exercise & weight monitoring are important for maintaining a healthy lifestyle   You may be retaining fluid if you have a history of heart failure or if you experience any of the following symptoms:  Weight gain of 3 pounds or more overnight or 5 pounds in a week, increased swelling in our hands or feet or shortness of breath while lying flat in bed. Please call your doctor as soon as you notice any of these symptoms; do not wait until your next office visit. Recognize signs and symptoms of STROKE:  F  -  Face looks uneven  A  -  Arms unable to move or move unevenly  S  -  Speech slurred or non-existent  T  -  Time to call 911 - as soon as signs and symptoms begin - DO NOT go back to bed or wait to see If you get better - TIME IS BRAIN. Colorectal Screening   Colorectal cancer almost always develops from precancerous polyps (abnormal growths) in the colon or rectum. Screening tests can find precancerous polyps, so that they can be removed before they turn into cancer.  Screening tests can also find colorectal cancer early, when treatment works best.  Mira Naik Speak with your physician about when you should begin screening and how often you should be tested  Mira Naik   Additional Information    If you have questions, please call [phone]. Remember, MyChart is NOT to be used for urgent needs. For medical emergencies, dial 911. Educational references and/or instructions provided during this visit included:              Discharge information has been reviewed with the patient. The patient verbalized understanding.

## 2020-06-23 NOTE — H&P
[facility]    [email or web address]      Impression:   1.dysphagia reflux abd pain       Plan:     1. egd dil mac allr isks benfits and aalt discussed       Chief Complaint: dysphagia reflux abd pain       HPI:  Aliza Steven is a 78 y.o. male who is being seen on consult for dysphaiga reflux abd pain . PMH:   Past Medical History:   Diagnosis Date    Arthritis     Dr. Anika Wheatley hips/knees/hands    BPH with obstruction/lower urinary tract symptoms 2/23/2017    Cervical radiculopathy 2012    Dr. Vasquez Canas polyp     Dr. Srikanth Corral 2009; Dr Elvis Bermudez 9/7/18    Detrusor and sphincter dyssynergia     Dr. Vi Hernandez in past    Disc disease, degenerative, thoracic or thoracolumbar     MRI    Dyslipidemia     FHx: colon cancer     Frozen shoulder syndrome 2013    Dr. Chau Roberson GERD (gastroesophageal reflux disease)     HSV (herpes simplex virus) infection     Insomnia     Overweight (BMI 25.0-29. 9)     IF 3/19 start weight 165 lbs    Syncope     Dr. Mellissa Manzo 2009 neg holter and w/u       PSH:   Past Surgical History:   Procedure Laterality Date    CHEST SURGERY PROCEDURE UNLISTED  7/14    CT chest neg for nodule    HX CATARACT REMOVAL  2012    Dr. Artemio Corral 2009 polyp; 2013; Dr Elvis Bermudez 9/7/18 polyp        Social HX:   Social History     Socioeconomic History    Marital status:      Spouse name: Not on file    Number of children: Not on file    Years of education: Not on file    Highest education level: Not on file   Occupational History    Occupation: ret , owns painting co   Social Needs    Financial resource strain: Not on file    Food insecurity     Worry: Not on file     Inability: Not on file   [employer] needs     Medical: Not on file     Non-medical: Not on file   Tobacco Use    Smoking status: Never Smoker    Smokeless tobacco: Never Used   Substance and Sexual Activity    Alcohol use: No    Drug use: No    Sexual activity: Not on file   Lifestyle    Physical activity     Days per week: Not on file     Minutes per session: Not on file    Stress: Not on file   Relationships    Social connections     Talks on phone: Not on file     Gets together: Not on file     Attends Pentecostalism service: Not on file     Active member of club or organization: Not on file     Attends meetings of clubs or organizations: Not on file     Relationship status: Not on file    Intimate partner violence     Fear of current or ex partner: Not on file     Emotionally abused: Not on file     Physically abused: Not on file     Forced sexual activity: Not on file   Other Topics Concern    Not on file   Social History Narrative    Not on file       FHX:   Family History   Problem Relation Age of Onset    Cancer Mother         colon    Cancer Sister         breast       Allergy:   Allergies   Allergen Reactions    Flomax [Tamsulosin] Vertigo    Uroxatral [Alfuzosin] Vertigo     And fainting       Home Medications:     No medications prior to admission. Review of Systems:     Constitutional: No fevers, chills, weight loss, fatigue. Skin: No rashes, pruritis, jaundice, ulcerations, erythema. HENT: No headaches, nosebleeds, sinus pressure, rhinorrhea, sore throat. Eyes: No visual changes, blurred vision, eye pain, photophobia, jaundice. Cardiovascular: No chest pain, heart palpitations. Respiratory: No cough, SOB, wheezing, chest discomfort, orthopnea. Gastrointestinal: Dysphagia reflux abd pain    Genitourinary: No dysuria, bleeding, discharge, pyuria. Musculoskeletal: No weakness, arthralgias, wasting. Endo: No sweats. Heme: No bruising, easy bleeding. Allergies: As noted. Neurological: Cranial nerves intact. Alert and oriented. Gait not assessed. Psychiatric:  No anxiety, depression, hallucinations. There were no vitals taken for this visit.     Physical Assessment: constitutional: appearance: well developed, well nourished, normal habitus, no deformities, in no acute distress. skin: inspection: no rashes, ulcers, icterus or other lesions; no clubbing or telangiectasias. palpation: no induration or subcutaneos nodules. eyes: inspection: normal conjunctivae and lids; no jaundice pupils: symmetrical, normoreactive to light, normal accommodation and size. ENMT: mouth: normal oral mucosa,lips and gums; good dentition. oropharynx: normal tongue, hard and soft palate; posterior pharynx without erythema, exudate or lesions. neck: no masses organomegaly or tenderness. respiratory: effort: normal chest excursion; no intercostal retraction or accessory muscle use. cardiovascular: abdominal aorta: normal size and position; no bruits. palpation: PMI of normal size and position; normal rhythm; no thrill or murmurs. abdominal: abdomen: normal consistency; no tenderness or masses. hernias: no hernias appreciated. liver: normal size and consistency. spleen: not palpable. rectal: hemoccult/guaiac: not performed. musculoskeletal: no deformities or muscle wasting   lymphatic: axilae: not palpable. groin: not palpable. neck: within normal limits. other: not palpable. neurologic: cranial nerves: II-XII normal.   psychiatric: judgement/insight: within normal limits. memory: within normal limits for recent and remote events. mood and affect: no evidence of depression, anxiety or agitation. orientation: oriented to time, space and person. Basic Metabolic Profile   No results for input(s): NA, K, CL, CO2, BUN, GLU, CA, MG, PHOS in the last 72 hours. No lab exists for component: CREAT      CBC w/Diff    No results for input(s): WBC, RBC, HGB, HCT, MCV, MCH, MCHC, RDW, PLT, HGBEXT, HCTEXT, PLTEXT in the last 72 hours. No lab exists for component: MPV No results for input(s): GRANS, LYMPH, EOS, PRO, MYELO, METAS, BLAST in the last 72 hours.     No lab exists for component: MONO, BASO     Hepatic Function   No results for input(s): ALB, TP, TBILI, AP, AML, LPSE in the last 72 hours. No lab exists for component: DBILI, GPT, SGOT       Adán Pradhan MD, MTERRIE. [facility], [address] [place] [address]  [email or web address]

## 2020-07-17 ENCOUNTER — TELEPHONE (OUTPATIENT)
Dept: INTERNAL MEDICINE CLINIC | Age: 70
End: 2020-07-17

## 2020-07-17 DIAGNOSIS — R21 RASH: Primary | ICD-10-CM

## 2020-07-17 RX ORDER — TRIAMCINOLONE ACETONIDE 5 MG/G
CREAM TOPICAL 2 TIMES DAILY
Qty: 30 G | Refills: 1 | Status: SHIPPED | OUTPATIENT
Start: 2020-07-17 | End: 2021-10-07 | Stop reason: SDUPTHER

## 2020-07-17 NOTE — TELEPHONE ENCOUNTER
Pt calling, needs a new rx sent in for the creme that RD gave him for itching in his genital area. He doesn't remember name of it and can't find the box it came in but he wants it sent to [facility] on [address].

## 2020-09-13 DIAGNOSIS — E78.5 DYSLIPIDEMIA: ICD-10-CM

## 2020-09-13 RX ORDER — ATORVASTATIN CALCIUM 10 MG/1
TABLET, FILM COATED ORAL
Qty: 90 TAB | Refills: 3 | Status: SHIPPED | OUTPATIENT
Start: 2020-09-13 | End: 2021-12-15

## 2021-03-02 DIAGNOSIS — E78.5 DYSLIPIDEMIA: Primary | ICD-10-CM

## 2021-03-07 NOTE — PROGRESS NOTES
This is a Subsequent Medicare Annual Wellness Visit     I have reviewed the patient's medical history in detail and updated the computerized patient record. Depression Risk Factor Screening:     3 most recent PHQ Screens 3/12/2021   Little interest or pleasure in doing things Not at all   Feeling down, depressed, irritable, or hopeless Not at all   Total Score PHQ 2 0     SCREENINGS  Colonoscopy last done 9/18 Dr Marlon Ruff  Prostate ODALYS/PSA done 2020    Immunization History   Administered Date(s) Administered    Covid-19, MODERNA, Mrna, Lnp-s, Pf, 100mcg/0.5mL 03/04/2021    Influenza High Dose Vaccine PF 10/15/2015, 11/14/2016, 10/03/2017, 09/27/2018, 10/22/2019    Influenza Vaccine 11/10/2014    Influenza Vaccine (Quad) PF (>6 Mo Flulaval, Fluarix, and >3 Yrs Afluria, Fluzone [id number]) 10/22/2019    Influenza, Quadrivalent, Adjuvanted (>65 Yrs FLUAD QUAD [id number]) 08/28/2020    Pneumococcal Conjugate (PCV-13) 02/11/2016    Pneumococcal Polysaccharide (PPSV-23) 02/05/2015    Td 01/01/2010    Tdap 02/19/2017    Zoster Recombinant 03/14/2019, 08/14/2019    Zoster Vaccine, Live 01/01/2010     Alcohol Risk Screen    Do you average more than 1 drink per night or more than 7 drinks a week: No    In the past three months have you have had more than 4 drinks containing alcohol on one occasion: No        Functional Ability and Level of Safety:    Hearing: Hearing is good. Activities of Daily Living: The home contains: no safety equipment. Patient does total self care      Ambulation: with no difficulty     Fall Risk:  Fall Risk Assessment, last 12 mths 3/12/2021   Able to walk? Yes   Fall in past 12 months? 0   Do you feel unsteady? 0   Are you worried about falling 0   Number of falls in past 12 months -   Fall with injury?  -      Abuse Screen:  Patient is not abused       Cognitive Screening    Has your family/caregiver stated any concerns about your memory: no    Assessment/Plan   Education and counseling provided:  Are appropriate based on today's review and evaluation  End-of-Life planning (with patient's consent)  Pneumococcal Vaccine  Influenza Vaccine  Colorectal cancer screening tests  Cardiovascular screening blood test  Diabetes screening test    Diagnoses and all orders for this visit:    1. Medicare annual wellness visit, subsequent    2. Advanced directives, counseling/discussion    3. Screening for diabetes mellitus    4. Prostate cancer ([facility] [address]    5. Dyslipidemia    6. Hyperplastic colonic polyp, unspecified part of colon    7. Primary osteoarthritis involving multiple joints    lab results and schedule of future lab studies reviewed with patient  cardiovascular risk and specific lipid/LDL goals reviewed. End of life discussion undertaken. He will work on getting medical directive in place  Colonoscopy to be scheduled per Dr Preston Porter 2023, fiber      Health Maintenance Due   There are no preventive care reminders to display for this patient.     Patient Care Team   Patient Care Team:  Ginny Negro MD as PCP - General (Internal Medicine)  Ginny Negro MD as PCP - [facility]  Criss Justice MD (Orthopedic Surgery)    History     Patient Active Problem List   Diagnosis Code    Colon polyp Dr. Samir Gamez  K63.5    Dyslipidemia E78.5    Arthritis, degenerative Dr Cristina Prieto M19.90    BPH with obstruction/lower urinary tract symptoms N40.1, N13.8    Prostate cancer ([facility] [address] C61     Past Medical History:   Diagnosis Date    BPH with obstruction/lower urinary tract symptoms 2/23/2017    Colon polyp     Dr. Samir Gamez 2009; Dr Preston Porter 9/7/18    Degenerative arthritis of cervical spine     Dr. Vania Lutz cervical radiculopathy 2012; mri 5/17 showed multilevel dz with ankylosis C3-5    Degenerative arthritis of thoracic spine     MRI    Detrusor and sphincter dyssynergia     Dr. Alexandr Patel in past    Dyslipidemia     FHx: colon cancer     Frozen shoulder syndrome 2013    Dr. Gilbert Christy GERD (gastroesophageal reflux disease)     HSV (herpes simplex virus) infection     Insomnia     Osteoarthritis     Dr. Neel Fitch hips/knees/hands    Overweight (BMI 25.0-29. 9)     IF 3/19 start weight 165 lbs    Prostate cancer ([facility] [address] 05/2020    Dr John Bella;  [id number] GS 4+4 prostate cancer in 5/20 cores, S/p TRUS Bx 1/4/21. Prostate vol. 58.59cc.  Syncope     Dr. Jose Chadwick 2009 neg holter and w/u      Past Surgical History:   Procedure Laterality Date    HX CATARACT REMOVAL  2012    Dr. Leeanna Haider [facility] 2009 polyp; 2013; Dr Torrie Joshi 9/7/18 polyp     HX ROTATOR CUFF REPAIR      2014, 2018    [place] CHEST SURGERY PROCEDURE UNLISTED  7/14    CT chest neg for nodule     Current Outpatient Medications   Medication Sig Dispense Refill    atorvastatin (LIPITOR) 10 mg tablet take 1 tablet by mouth once daily 90 Tab 3    triamcinolone (ARISTOCORT) 0.5 % topical cream Apply  to affected area two (2) times a day. use thin layer 30 g 1    omeprazole (PRILOSEC) 40 mg capsule Take 1 Cap by mouth daily. 90 Cap 3    multivitamin (ONE A DAY) tablet Take 1 Tab by mouth daily. Allergies   Allergen Reactions    Flomax [Tamsulosin] Vertigo    Uroxatral [Alfuzosin] Vertigo     And fainting       Family History   Problem Relation Age of Onset    Cancer Mother         colon    Cancer Sister         breast     Social History     Tobacco Use    Smoking status: Never Smoker    Smokeless tobacco: Never Used   Substance Use Topics    Alcohol use:  No

## 2021-03-07 NOTE — PROGRESS NOTES
69 y.o. white male who presents for evaluation    Here also for med clearance prior to planned DVP by Dr. Jordan Smith for prostate cancer. He had spoken to [facility] onc in [place] and [address] [place] [address] and decided to go surgical route instead    Reports no cardiovascular complaints, he continues to bike although with difficulty as he has been having some right hip pain. His films showed advanced arthritis in the hips, he is interested in seeing Dr. Cecilia Sim later this year. No GI or new  complaints    LAST MEDICARE WELLNESS EXAM: 2/5/15, 2/12/16, 2/23/17, 3/1/18, 3/11/19, 3/16/20, 3/11/21    Past Medical History:   Diagnosis Date    BPH with obstruction/lower urinary tract symptoms 2/23/2017    Colon polyp     Dr. Gretel Jane 2009; Dr Giovani Leon 9/7/18    Degenerative arthritis of cervical spine     Dr. Katie Fontanez cervical radiculopathy 2012; mri 5/17 showed multilevel dz with ankylosis C3-5    Degenerative arthritis of thoracic spine     MRI    Detrusor and sphincter dyssynergia     Dr. Cortes Rodriguez in past    Dyslipidemia     FHx: colon cancer     Frozen shoulder syndrome 2013    Dr. Pablo Maurer GERD (gastroesophageal reflux disease)     HSV (herpes simplex virus) infection     Insomnia     Osteoarthritis     Dr. Esthela Valdez hips/knees/hands    Overweight (BMI 25.0-29. 9)     IF 3/19 start weight 165 lbs    Prostate cancer ([facility] [address] 05/2020    Dr Jordan Smith;  [id number] GS 4+4 prostate cancer in 5/20 cores, S/p TRUS Bx 1/4/21. Prostate vol. 58.59cc.     Syncope     Dr. Micha Vincent 2009 neg holter and w/u     Past Surgical History:   Procedure Laterality Date    HX CATARACT REMOVAL  2012    Dr. Maite Jane 2009 polyp; 2013; Dr Giovani Leon 9/7/18 polyp     HX ROTATOR CUFF REPAIR      2014, 2018    [place] CHEST SURGERY PROCEDURE UNLISTED  7/14    CT chest neg for nodule     Social History     Socioeconomic History    Marital status:      Spouse name: Not on file    Number of children: Not on file    Years of education: Not on file    Highest education level: Not on file   Occupational History    Occupation: ret , owns painting co   Social Needs    Financial resource strain: Not on file    Food insecurity     Worry: Not on file     Inability: Not on file   [employer] needs     Medical: Not on file     Non-medical: Not on file   Tobacco Use    Smoking status: Never Smoker    Smokeless tobacco: Never Used   Substance and Sexual Activity    Alcohol use: No    Drug use: No    Sexual activity: Not on file   Lifestyle    Physical activity     Days per week: Not on file     Minutes per session: Not on file    Stress: Not on file   Relationships    Social connections     Talks on phone: Not on file     Gets together: Not on file     Attends Zoroastrianism service: Not on file     Active member of club or organization: Not on file     Attends meetings of clubs or organizations: Not on file     Relationship status: Not on file    Intimate partner violence     Fear of current or ex partner: Not on file     Emotionally abused: Not on file     Physically abused: Not on file     Forced sexual activity: Not on file   Other Topics Concern    Not on file   Social History Narrative    Not on file     Allergies   Allergen Reactions    Flomax [Tamsulosin] Vertigo    Uroxatral [Alfuzosin] Vertigo     And fainting      Current Outpatient Medications   Medication Sig    atorvastatin (LIPITOR) 10 mg tablet take 1 tablet by mouth once daily    triamcinolone (ARISTOCORT) 0.5 % topical cream Apply  to affected area two (2) times a day. use thin layer    omeprazole (PRILOSEC) 40 mg capsule Take 1 Cap by mouth daily.  multivitamin (ONE A DAY) tablet Take 1 Tab by mouth daily. No current facility-administered medications for this visit.       REVIEW OF SYSTEMS: colo 9/18 Dr Josh Bello, sees Dr Tru Canela - no vision change or eye pain  Oral - no mouth pain, tongue or tooth problems  Ears - no hearing loss, ear pain, fullness, no swallowing problems  Cardiac - no CP, PND, orthopnea, edema, palpitations or syncope  Chest - no breast masses  Resp - no wheezing, chronic coughing, dyspnea  GI - no heartburn, nausea, vomiting, change in bowel habits, bleeding, hemorrhoids  Urinary - no dysuria, hematuria, flank pain, urgency, frequency    Visit Vitals  /67   Pulse (!) 59   Temp 98.1 °F (36.7 °C) (Temporal)   Resp 14   Ht 5' 8\" (1.727 m)   Wt 151 lb (68.5 kg)   SpO2 98%   BMI 22.96 kg/m²   A&O x3  Affect is appropriate. Mood stable  No apparent distress  Anicteric, no JVD, adenopathy or thyromegaly. No carotid bruits or radiated murmur  Lungs clear to auscultation, no wheezes or rales  Heart showed regular rate and rhythm. No murmur, rubs, gallops  Abdomen soft nontender, no hepatosplenomegaly or masses. Extremities without edema.   Pulses 1-2+ symmetrically    LABS  From 2/11 showed gluc 85, cr 0.80,             alt 23,                   chol 182, tg 123, hdl 37, ldl-c 120, wbc 4.5, hb 13.8, plt 257, psa 1.80       From 2/12 showed gluc 84, cr 0.70,             alt 19, ldl-p 1679                                                         wbc 4.0, hb 13.1, plt 266, psa 2.03  From 3/13 showed gluc 86, cr 0.80,             alt 19, ldl-p 1779, chol 171, tg 93,   hdl 42, ldl-c 110, tsh 0.85  From 9/13 showed                        ldl-p 1809, chol 192, tg 92,   hdl 49, ldl-c 125  From 1/14 showed gluc 84, cr 0.90, gfr>60, alt 41, ldl-p 1062, chol 131, tg 120, hdl 41, ldl-c 66,   tsh 0.85  From 6/14 showed gluc 83, cr 0.80, gfr>60,                   wbc 4.5, hb 14.0, plt 231  From 2/15 showed           chol 123, tg 79,   hdl 42, ldl-c 66,          psa 2.78  From 2/16 showed gluc 87, cr 0.85, gfr>60, alt 33,      chol 121, tg 103, hdl 34, ldl-c 66,   wbc 4.7, hb 14.2, plt 281, psa 3.20  From 2/17 showed gluc 85, cr 0.85, gfr>60, alt 32,       chol 127, tg 63,   hdl 54, ldl-c 60,   wbc 4.3, hb 14.9, plt 247, hep c neg  From 12/17 showed glu 96, cr 0.70, gfr>60,                   wbc 4.7, hb 14.1, plt 255  From 3/19 showed gluc 84, cr 0.81, gfr>60, alt 27,       chol 124, tg 77,   hdl 42, ldl-c 67,   wbc 4.3, hb 14.3, plt 253  From 3/20 showed gluc 80, cr 0.76, gfr>60, alt 27,       chol 138, tg 81,   hdl 46, ldl-c 76,   wbc 5.4, hb 14.4, plt 246, psa 6.70    Results for orders placed or performed during the hospital encounter of 03/11/21   LIPID PANEL   Result Value Ref Range    LIPID PROFILE          Cholesterol, total 156 <200 MG/DL    Triglyceride 123 <150 MG/DL    HDL Cholesterol 46 40 - 60 MG/DL    LDL, calculated 85.4 0 - 100 MG/DL    VLDL, calculated 24.6 MG/DL    CHOL/HDL Ratio 3.4 0 - 5.0     METABOLIC PANEL, COMPREHENSIVE   Result Value Ref Range    Sodium 143 136 - 145 mmol/L    Potassium 4.8 3.5 - 5.5 mmol/L    Chloride 106 100 - 111 mmol/L    CO2 31 21 - 32 mmol/L    Anion gap 6 3.0 - 18 mmol/L    Glucose 93 74 - 99 mg/dL    BUN 16 7.0 - 18 MG/DL    Creatinine 0.80 0.6 - 1.3 MG/DL    BUN/Creatinine ratio 20 12 - 20      GFR est AA >60 >60 ml/min/1.73m2    GFR est non-AA >60 >60 ml/min/1.73m2    Calcium 10.1 8.5 - 10.1 MG/DL    Bilirubin, total 1.0 0.2 - 1.0 MG/DL    ALT (SGPT) 27 16 - 61 U/L    AST (SGOT) 24 10 - 38 U/L    Alk.  phosphatase 65 45 - 117 U/L    Protein, total 7.6 6.4 - 8.2 g/dL    Albumin 4.4 3.4 - 5.0 g/dL    Globulin 3.2 2.0 - 4.0 g/dL    A-G Ratio 1.4 0.8 - 1.7     CBC W/O DIFF   Result Value Ref Range    WBC 4.9 4.6 - 13.2 K/uL    RBC 4.87 4.70 - 5.50 M/uL    HGB 15.1 13.0 - 16.0 g/dL    HCT 45.1 36.0 - 48.0 %    MCV 92.6 74.0 - 97.0 FL    MCH 31.0 24.0 - 34.0 PG    MCHC 33.5 31.0 - 37.0 g/dL    RDW 13.1 11.6 - 14.5 %    PLATELET 349 170 - 092 K/uL    MPV 9.9 9.2 - 11.8 FL     PREOP  From 3/21 showed gluv 93, cr 0.80, gfr>60, wbc 4.7, hb 14.2, plt 272, inr 0.95, ptt 25, k 4.4, ua neg  CXR showed GRANT  ekg showed nsr 63, nl axis, nl int, nothing acute    Patient Active Problem List   Diagnosis Code    Colon polyp Dr. Yolette Nuñez  K63.5    Dyslipidemia E78.5    Arthritis, degenerative Dr Philly Angel M19.90    BPH with obstruction/lower urinary tract symptoms N40.1, N13.8     Assessment and plan:  1. GERD. ppi and avoidance measures  2. Dyslipidemia. Continue lipitor  3. Colon polyp and FH colon ca. Fiber, f/u 2023  4. Prostate ca. He is felt to be average risk for the planned procedure, no contraindications noted. Routine postop prophylaxis per protocol. If needed, cover with correction insulin per hospital protocol  5. Overweight. Lifestyle and dietary measures. Portion control reiterated. 6.  Arthritis. F/u ortho later this year        RTC 3/22    Above conditions discussed at length and patient vocalized understanding.   All questions answered to patient satisfaction

## 2021-03-11 ENCOUNTER — APPOINTMENT (OUTPATIENT)
Dept: INTERNAL MEDICINE CLINIC | Age: 71
End: 2021-03-11

## 2021-03-11 ENCOUNTER — HOSPITAL ENCOUNTER (OUTPATIENT)
Dept: LAB | Age: 71
Discharge: HOME | End: 2021-03-11
Payer: MEDICARE

## 2021-03-11 DIAGNOSIS — E78.5 DYSLIPIDEMIA: ICD-10-CM

## 2021-03-11 LAB
ALBUMIN SERPL-MCNC: 4.4 G/DL (ref 3.4–5)
ALBUMIN/GLOB SERPL: 1.4 {RATIO} (ref 0.8–1.7)
ALP SERPL-CCNC: 65 U/L (ref 45–117)
ALT SERPL-CCNC: 27 U/L (ref 16–61)
ANION GAP SERPL CALC-SCNC: 6 MMOL/L (ref 3–18)
AST SERPL-CCNC: 24 U/L (ref 10–38)
BILIRUB SERPL-MCNC: 1 MG/DL (ref 0.2–1)
BUN SERPL-MCNC: 16 MG/DL (ref 7–18)
BUN/CREAT SERPL: 20 (ref 12–20)
CALCIUM SERPL-MCNC: 10.1 MG/DL (ref 8.5–10.1)
CHLORIDE SERPL-SCNC: 106 MMOL/L (ref 100–111)
CHOLEST SERPL-MCNC: 156 MG/DL
CO2 SERPL-SCNC: 31 MMOL/L (ref 21–32)
CREAT SERPL-MCNC: 0.8 MG/DL (ref 0.6–1.3)
ERYTHROCYTE [DISTWIDTH] IN BLOOD BY AUTOMATED COUNT: 13.1 % (ref 11.6–14.5)
GLOBULIN SER CALC-MCNC: 3.2 G/DL (ref 2–4)
GLUCOSE SERPL-MCNC: 93 MG/DL (ref 74–99)
HCT VFR BLD AUTO: 45.1 % (ref 36–48)
HDLC SERPL-MCNC: 46 MG/DL (ref 40–60)
HDLC SERPL: 3.4 {RATIO} (ref 0–5)
HGB BLD-MCNC: 15.1 G/DL (ref 13–16)
LDLC SERPL CALC-MCNC: 85.4 MG/DL (ref 0–100)
LIPID PROFILE,FLP: NORMAL
MCH RBC QN AUTO: 31 PG (ref 24–34)
MCHC RBC AUTO-ENTMCNC: 33.5 G/DL (ref 31–37)
MCV RBC AUTO: 92.6 FL (ref 74–97)
PLATELET # BLD AUTO: 284 K/UL (ref 135–420)
PMV BLD AUTO: 9.9 FL (ref 9.2–11.8)
POTASSIUM SERPL-SCNC: 4.8 MMOL/L (ref 3.5–5.5)
PROT SERPL-MCNC: 7.6 G/DL (ref 6.4–8.2)
RBC # BLD AUTO: 4.87 M/UL (ref 4.7–5.5)
SODIUM SERPL-SCNC: 143 MMOL/L (ref 136–145)
TRIGL SERPL-MCNC: 123 MG/DL (ref ?–150)
VLDLC SERPL CALC-MCNC: 24.6 MG/DL
WBC # BLD AUTO: 4.9 K/UL (ref 4.6–13.2)

## 2021-03-11 PROCEDURE — 80061 LIPID PANEL: CPT

## 2021-03-11 PROCEDURE — 85027 COMPLETE CBC AUTOMATED: CPT

## 2021-03-11 PROCEDURE — 80053 COMPREHEN METABOLIC PANEL: CPT

## 2021-03-12 ENCOUNTER — OFFICE VISIT (OUTPATIENT)
Dept: INTERNAL MEDICINE CLINIC | Age: 71
End: 2021-03-12
Payer: MEDICARE

## 2021-03-12 VITALS
WEIGHT: 151 LBS | DIASTOLIC BLOOD PRESSURE: 67 MMHG | HEART RATE: 59 BPM | SYSTOLIC BLOOD PRESSURE: 119 MMHG | RESPIRATION RATE: 14 BRPM | OXYGEN SATURATION: 98 % | TEMPERATURE: 98.1 F | HEIGHT: 68 IN | BODY MASS INDEX: 22.88 KG/M2

## 2021-03-12 DIAGNOSIS — M15.9 PRIMARY OSTEOARTHRITIS INVOLVING MULTIPLE JOINTS: ICD-10-CM

## 2021-03-12 DIAGNOSIS — Z00.00 MEDICARE ANNUAL WELLNESS VISIT, SUBSEQUENT: Primary | ICD-10-CM

## 2021-03-12 DIAGNOSIS — K63.5 HYPERPLASTIC COLONIC POLYP, UNSPECIFIED PART OF COLON: ICD-10-CM

## 2021-03-12 DIAGNOSIS — E78.5 DYSLIPIDEMIA: ICD-10-CM

## 2021-03-12 DIAGNOSIS — Z71.89 ADVANCED DIRECTIVES, COUNSELING/DISCUSSION: ICD-10-CM

## 2021-03-12 DIAGNOSIS — C61 PROSTATE CANCER (HCC): ICD-10-CM

## 2021-03-12 DIAGNOSIS — Z13.1 SCREENING FOR DIABETES MELLITUS: ICD-10-CM

## 2021-03-12 PROCEDURE — G0439 PPPS, SUBSEQ VISIT: HCPCS | Performed by: INTERNAL MEDICINE

## 2021-03-12 PROCEDURE — 1101F PT FALLS ASSESS-DOCD LE1/YR: CPT | Performed by: INTERNAL MEDICINE

## 2021-03-12 PROCEDURE — G8510 SCR DEP NEG, NO PLAN REQD: HCPCS | Performed by: INTERNAL MEDICINE

## 2021-03-12 PROCEDURE — 99497 ADVNCD CARE PLAN 30 MIN: CPT | Performed by: INTERNAL MEDICINE

## 2021-03-12 PROCEDURE — G8536 NO DOC ELDER MAL SCRN: HCPCS | Performed by: INTERNAL MEDICINE

## 2021-03-12 PROCEDURE — G8427 DOCREV CUR MEDS BY ELIG CLIN: HCPCS | Performed by: INTERNAL MEDICINE

## 2021-03-12 PROCEDURE — G0463 HOSPITAL OUTPT CLINIC VISIT: HCPCS | Performed by: INTERNAL MEDICINE

## 2021-03-12 PROCEDURE — G8420 CALC BMI NORM PARAMETERS: HCPCS | Performed by: INTERNAL MEDICINE

## 2021-03-12 PROCEDURE — 99214 OFFICE O/P EST MOD 30 MIN: CPT | Performed by: INTERNAL MEDICINE

## 2021-03-12 PROCEDURE — 3017F COLORECTAL CA SCREEN DOC REV: CPT | Performed by: INTERNAL MEDICINE

## 2021-03-12 NOTE — ACP (ADVANCE CARE PLANNING)
Advance Care Planning     General Advance Care Planning (ACP) Conversation      Date of Conversation: 3/12/2021  Conducted with: Patient with Decision Making Capacity    Healthcare Decision Maker:     Click here to complete [address] including selection of the [address] Relationship (ie \"Primary\")  Today we documented Decision Maker(s) consistent with Legal Next of Kin hierarchy.     Content/Action Overview:   Has NO ACP documents/care preferences - information provided, considering goals and options  Reviewed DNR/DNI and patient elects Full Code (Attempt Resuscitation)  Topics discussed: ventilation preferences, hospitalization preferences and resuscitation preferences  Additional Comments: none     Length of Voluntary ACP Conversation in minutes:  16 minutes    Bela Heart MD

## 2021-03-12 NOTE — PATIENT INSTRUCTIONS
Medicare Wellness Visit, Male    The best way to live healthy is to have a lifestyle where you eat a well-balanced diet, exercise regularly, limit alcohol use, and quit all forms of tobacco/nicotine, if applicable. Regular preventive services are another way to keep healthy. Preventive services (vaccines, screening tests, monitoring & exams) can help personalize your care plan, which helps you manage your own care. Screening tests can find health problems at the earliest stages, when they are easiest to treat. [facility] follows the current, evidence-based guidelines published by the [facility] Piyush Nathan ([facility]) when recommending preventive services for our patients. Because we follow these guidelines, sometimes recommendations change over time as research supports it. (For example, a prostate screening blood test is no longer routinely recommended for men with no symptoms). Of course, you and your doctor may decide to screen more often for some diseases, based on your risk and co-morbidities (chronic disease you are already diagnosed with). Preventive services for you include:  - Medicare offers their members a free annual wellness visit, which is time for you and your primary care provider to discuss and plan for your preventive service needs. Take advantage of this benefit every year!  -All adults over age 72 should receive the recommended pneumonia vaccines. Current USPSTF guidelines recommend a series of two vaccines for the best pneumonia protection.   -All adults should have a flu vaccine yearly and tetanus vaccine every 10 years.  -All adults age 48 and older should receive the shingles vaccines (series of two vaccines).        -All adults age 38-68 who are overweight should have a diabetes screening test once every three years.   -Other screening tests & preventive services for persons with diabetes include: an eye exam to screen for diabetic retinopathy, a kidney function test, a foot exam, and stricter control over your cholesterol.   -Cardiovascular screening for adults with routine risk involves an electrocardiogram (ECG) at intervals determined by the provider.   -Colorectal cancer screening should be done for adults age 54-65 with no increased risk factors for colorectal cancer. There are a number of acceptable methods of screening for this type of cancer. Each test has its own benefits and drawbacks. Discuss with your provider what is most appropriate for you during your annual wellness visit. The different tests include: colonoscopy (considered the best screening method), a fecal occult blood test, a fecal DNA test, and sigmoidoscopy.  -All adults born between [facility] should be screened once for Hepatitis C.  -An Abdominal Aortic Aneurysm (AAA) Screening is recommended for men age 73-68 who has ever smoked in their lifetime. Here is a list of your current Health Maintenance items (your personalized list of preventive services) with a due date: There are no preventive care reminders to display for this patient.

## 2021-03-16 NOTE — PROGRESS NOTES
PT DAILY TREATMENT NOTE - [facility]     Patient Name: Ricarda Linton  Date:10/3/2017  : 1950  [x]  Patient  Verified  Payor: [place] MEDICARE / Plan: [place] MEDICARE PART A & B / Product Type: Medicare /    In time:900  Out time:930  Total Treatment Time (min): 30  Total Timed Codes (min): 30  1:1 Treatment Time (MC only): 30   Visit #: 4 of 20    Treatment Area: Clavicle pain [M89.8X1]    SUBJECTIVE  Pain Level (0-10 scale): 1/10  Any medication changes, allergies to medications, adverse drug reactions, diagnosis change, or new procedure performed?: [x] No    [] Yes (see summary sheet for update)  Subjective functional status/changes:   [] No changes reported  Pt stated that he just has a little pain in the right shoulder, but he had a sharp pain in his L when he was reaching the other day    OBJECTIVE    22 min Therapeutic Exercise:  [x] See flow sheet :   Rationale: increase ROM and increase strength to improve the patients ability to increase ease with ADLs    8 min Manual Therapy:  scap mobs, trp release to infraspinatus and supraspinatus   Rationale: decrease pain, increase ROM and increase tissue extensibility to increase ease with ADLs    With   [x] TE   [] TA   [] neuro   [] other: Patient Education: [x] Review HEP    [] Progressed/Changed HEP based on:   [] positioning   [] body mechanics   [] transfers   [] heat/ice application    [] other:      Other Objective/Functional Measures:   Pt applies more than sub max pressure with isometrics  Pt stated that he could really feel the wand scaption exercises and the wall wipes  Pt had no complaint of increased pain during session  Pt has good scapular mobility  Had one large trp in infraspinatus which resolved with manual     Pain Level (0-10 scale) post treatment: 2/10    ASSESSMENT/Changes in Function:   Pt is slowly progressing toward goals. Pain level continues to decrease.  Cont with decreased active ROM and strength in R sh.    Patient will continue to atraumatic , normocephalic benefit from skilled PT services to modify and progress therapeutic interventions, address functional mobility deficits, address ROM deficits and address strength deficits to attain remaining goals. [x]  See Plan of Care  []  See progress note/recertification  []  See Discharge Summary         Progress towards goals / Updated goals:  Short Term Goals: To be accomplished in 5 treatments:  1. Pt will report compliance and independence to [facility] to help the pt manage their pain and symptoms. met  2. Pt will report compliance with his sling per MD recommendations to improve tolerance to therapy interventions.                    Not met     Long Term Goals: To be accomplished in 20 treatments:  1. Pt will increase FOTO score to 66 points to improve ability to perform ADLs. 2. Pt will report a decrease in at worst pain to 5/10 in the right clavicle/shoulder region to improve ability to brush his teeth with his right UE with less pain. 3. Pt will increase AROM right shoulder flex/ABD to 125 degs, ER to 50 degs (at 45 degs ABD), and IR to [facility] improve ability to tolerate reaching with the right UE. 4. Pt will report having mild to no difficulty with reaching into a shelf at shoulder height with the right UE to improve ability to perform household tasks.     PLAN  []  Upgrade activities as tolerated     [x]  Continue plan of care  []  Update interventions per flow sheet       []  Discharge due to:_  []  Other:_      Terrell Jones PTA 10/3/2017  9:00 AM    Future Appointments  Date Time Provider Patrica Grider   10/5/2017 8:30 AM Yue Booker PT MMCPTP[facility]   10/9/2017 8:00 AM Thomas WARE [facility]   10/11/2017 8:00 AM Yue Booker PT UHAMDMO [facility]   10/16/2017 8:30 AM Terrell Jones PTA HUGDVSB [facility]   10/18/2017 8:00 AM Terrell Jones PTA SLQXCPI [facility]   10/23/2017 8:00 AM Terrell Jones PTA [facility]CP[facility]   10/25/2017 8:00 AM Yeu Booker PT [facility]   10/30/2017 8:00 AM Raul Heller, PT [facility]CPTP[facility]   11/1/2017 8:00 AM Lola Martínez PTA [facility]CPTP[facility]   2/22/2018 7:45 AM IO NURSE VISIT [facility] STEVE [facility]   3/1/2018 2:00 PM Daksha Gamino MD [facility]

## 2021-03-24 ENCOUNTER — TELEPHONE (OUTPATIENT)
Dept: INTERNAL MEDICINE CLINIC | Age: 71
End: 2021-03-24

## 2021-03-24 NOTE — TELEPHONE ENCOUNTER
Patient stating he spoke to the surgeon's office and they advised him that he needs to see his pcp ASAP for a possible throat infection. No appts until next week. Please advise.

## 2021-03-24 NOTE — TELEPHONE ENCOUNTER
Pt stated he tried reaching urology but no response yet. Pt stated he had surgery 1 week ago today. Pt stated he had a tube down his throat about 4 hours. He is still having throat pain, looks raw, red lesions, one red lesion on uvula. He stated his throat looks infected. Trouble swallowing. Coughing up phlegm with no color. No fever.

## 2021-03-24 NOTE — TELEPHONE ENCOUNTER
Patient is asking to speak with a nurse. Stating he had surgery a a week ago and he was on the ventilator for 4 hours. Stating he thinks his throat may be infected. He is having pain and there are some lesions. He has called the surgeons office but can't get a response.

## 2021-07-12 RX ORDER — OMEPRAZOLE 40 MG/1
40 CAPSULE, DELAYED RELEASE ORAL DAILY
Qty: 90 CAPSULE | Refills: 3 | Status: SHIPPED | OUTPATIENT
Start: 2021-07-12 | End: 2022-10-10 | Stop reason: SDUPTHER

## 2021-10-07 DIAGNOSIS — R21 RASH: ICD-10-CM

## 2021-10-07 NOTE — TELEPHONE ENCOUNTER
PCP: Yovany Mendez MD    Last appt: 3/16/2020  Future Appointments   Date Time Provider Patrica Grider   10/18/2021 11:30 AM [facility] [address]   1/10/2022 10:50 AM [facility] TECH NURSE [facility] STEVE SCHED   1/17/2022  1:15 PM Pop Lawler MD [address]   3/4/2022  7:55 AM [facility] LAB VISIT [facility] BS AMB   3/18/2022 10:40 AM Sruthi Cardenas MD [facility] BS AMB       Requested Prescriptions     Pending Prescriptions Disp Refills    triamcinolone (ARISTOCORT) 0.5 % topical cream 30 g 1     Sig: Apply  to affected area two (2) times a day.  use thin layer

## 2021-10-08 RX ORDER — TRIAMCINOLONE ACETONIDE 5 MG/G
CREAM TOPICAL 2 TIMES DAILY
Qty: 30 G | Refills: 1 | Status: SHIPPED | OUTPATIENT
Start: 2021-10-08

## 2021-12-14 DIAGNOSIS — E78.5 DYSLIPIDEMIA: ICD-10-CM

## 2021-12-15 DIAGNOSIS — E78.5 DYSLIPIDEMIA: ICD-10-CM

## 2021-12-15 RX ORDER — ATORVASTATIN CALCIUM 10 MG/1
TABLET, FILM COATED ORAL
Qty: 90 TABLET | Refills: 3 | Status: SHIPPED | OUTPATIENT
Start: 2021-12-15

## 2021-12-16 NOTE — TELEPHONE ENCOUNTER
PCP: Pastora Hauser MD    Last appt: 3/16/2020  Future Appointments   Date Time Provider Patrica Grider   1/10/2022 10:50 AM [facility] NURSE [facility] STEVE JAMES   1/17/2022  1:15 PM Kimberly Murguia MD [address]   3/4/2022  7:55 AM [facility] LAB VISIT [facility] BS AMB   3/18/2022 10:40 AM Tomi Cardenas MD [facility] BS AMB       Requested Prescriptions     Pending Prescriptions Disp Refills    atorvastatin (LIPITOR) 10 mg tablet 90 Tablet 3     Sig: Take 1 Tablet by mouth daily.

## 2021-12-19 RX ORDER — ATORVASTATIN CALCIUM 10 MG/1
10 TABLET, FILM COATED ORAL DAILY
Qty: 90 TABLET | Refills: 3 | Status: SHIPPED | OUTPATIENT
Start: 2021-12-19 | End: 2022-10-05

## 2022-03-04 ENCOUNTER — HOSPITAL ENCOUNTER (OUTPATIENT)
Dept: LAB | Age: 72
Discharge: HOME | End: 2022-03-04
Payer: MEDICARE

## 2022-03-04 ENCOUNTER — LAB ONLY (OUTPATIENT)
Dept: INTERNAL MEDICINE CLINIC | Age: 72
End: 2022-03-04

## 2022-03-04 DIAGNOSIS — Z00.00 MEDICARE ANNUAL WELLNESS VISIT, SUBSEQUENT: ICD-10-CM

## 2022-03-04 DIAGNOSIS — Z13.1 SCREENING FOR DIABETES MELLITUS: ICD-10-CM

## 2022-03-04 DIAGNOSIS — E78.5 DYSLIPIDEMIA: Primary | ICD-10-CM

## 2022-03-04 DIAGNOSIS — E78.5 DYSLIPIDEMIA: ICD-10-CM

## 2022-03-04 LAB
ALBUMIN SERPL-MCNC: 4.3 G/DL (ref 3.4–5)
ALBUMIN/GLOB SERPL: 1.5 {RATIO} (ref 0.8–1.7)
ALP SERPL-CCNC: 53 U/L (ref 45–117)
ALT SERPL-CCNC: 25 U/L (ref 16–61)
ANION GAP SERPL CALC-SCNC: 3 MMOL/L (ref 3–18)
AST SERPL-CCNC: 20 U/L (ref 10–38)
BILIRUB SERPL-MCNC: 0.7 MG/DL (ref 0.2–1)
BUN SERPL-MCNC: 21 MG/DL (ref 7–18)
BUN/CREAT SERPL: 23 (ref 12–20)
CALCIUM SERPL-MCNC: 9.5 MG/DL (ref 8.5–10.1)
CHLORIDE SERPL-SCNC: 105 MMOL/L (ref 100–111)
CHOLEST SERPL-MCNC: 137 MG/DL
CO2 SERPL-SCNC: 32 MMOL/L (ref 21–32)
CREAT SERPL-MCNC: 0.9 MG/DL (ref 0.6–1.3)
ERYTHROCYTE [DISTWIDTH] IN BLOOD BY AUTOMATED COUNT: 13.1 % (ref 11.6–14.5)
GLOBULIN SER CALC-MCNC: 2.8 G/DL (ref 2–4)
GLUCOSE SERPL-MCNC: 80 MG/DL (ref 74–99)
HCT VFR BLD AUTO: 42.1 % (ref 36–48)
HDLC SERPL-MCNC: 45 MG/DL (ref 40–60)
HDLC SERPL: 3 {RATIO} (ref 0–5)
HGB BLD-MCNC: 13.7 G/DL (ref 13–16)
LDLC SERPL CALC-MCNC: 77.6 MG/DL (ref 0–100)
LIPID PROFILE,FLP: NORMAL
MCH RBC QN AUTO: 30.6 PG (ref 24–34)
MCHC RBC AUTO-ENTMCNC: 32.5 G/DL (ref 31–37)
MCV RBC AUTO: 94 FL (ref 78–100)
NRBC # BLD: 0 K/UL (ref 0–0.01)
NRBC BLD-RTO: 0 PER 100 WBC
PLATELET # BLD AUTO: 250 K/UL (ref 135–420)
PMV BLD AUTO: 9.6 FL (ref 9.2–11.8)
POTASSIUM SERPL-SCNC: 4 MMOL/L (ref 3.5–5.5)
PROT SERPL-MCNC: 7.1 G/DL (ref 6.4–8.2)
RBC # BLD AUTO: 4.48 M/UL (ref 4.35–5.65)
SODIUM SERPL-SCNC: 140 MMOL/L (ref 136–145)
TRIGL SERPL-MCNC: 72 MG/DL (ref ?–150)
VLDLC SERPL CALC-MCNC: 14.4 MG/DL
WBC # BLD AUTO: 3.8 K/UL (ref 4.6–13.2)

## 2022-03-04 PROCEDURE — 80053 COMPREHEN METABOLIC PANEL: CPT

## 2022-03-04 PROCEDURE — 80061 LIPID PANEL: CPT

## 2022-03-04 PROCEDURE — 85027 COMPLETE CBC AUTOMATED: CPT

## 2022-03-04 PROCEDURE — 36415 COLL VENOUS BLD VENIPUNCTURE: CPT

## 2022-03-06 NOTE — PROGRESS NOTES
This is a Subsequent Medicare Annual Wellness Visit     I have reviewed the patient's medical history in detail and updated the computerized patient record. Assessment/Plan   Education and counseling provided:  Are appropriate based on today's review and evaluation  Influenza Vaccine  Colorectal cancer screening tests  Cardiovascular screening blood test  Diabetes screening test    1. Bilateral hearing loss, unspecified hearing loss type  -     REFERRAL TO ENT-OTOLARYNGOLOGY  2. Dizziness  -     REFERRAL TO ENT-OTOLARYNGOLOGY  3. Tinnitus, unspecified laterality  -     REFERRAL TO ENT-OTOLARYNGOLOGY  4. Insomnia, unspecified type  -     zolpidem (AMBIEN) 10 mg tablet; Take 1 Tablet by mouth nightly as needed for Sleep. Max Daily Amount: 10 mg., Normal, Disp-30 Tablet, R-2  5. Skin lesion  -     REFERRAL TO DERMATOLOGY  6. Primary osteoarthritis involving multiple joints  7. Hyperplastic colonic polyp, unspecified part of colon  8. Dyslipidemia  -     CBC W/O DIFF; Future  -     METABOLIC PANEL, COMPREHENSIVE; Future  -     LIPID PANEL; Future  9. History of prostate cancer  10. Medicare annual wellness visit, subsequent  11. Screening for diabetes mellitus       Depression Risk Factor Screening     3 most recent PHQ Screens 3/15/2022   Little interest or pleasure in doing things Not at all   Feeling down, depressed, irritable, or hopeless Not at all   Total Score PHQ 2 0       Alcohol & Drug Abuse Risk Screen   Do you average more than 1 drink per night or more than 7 drinks a week?: (P) No  In the past three months have you had more than 4 drinks containing alcohol on one occasion?: (P) No            Functional Ability and Level of Safety   Hearing:  Hearing: (P) additional comments below  Hearing comments: (P) hard to hear clearly all the time. hearing is getting worse. Activities of Daily Living:   The home contains: (P) no safety equipment  Functional ADLs: (P) Patient does total self care Ambulation:  Patient ambulates: (P) with no difficulty     Fall Risk:  Fall Risk Assessment, last 12 mths 3/18/2022   Able to walk? Yes   Fall in past 12 months? 0   Do you feel unsteady? 0   Are you worried about falling 0   Number of falls in past 12 months -   Fall with injury? -     Abuse Screen:  Do you ever feel afraid of your partner?: No  Are you in a relationship with someone who physically or mentally threatens you?: No  Is it safe for you to go home?: Yes        Cognitive Screening   Has your family/caregiver stated any concerns about your memory?: (P) No     Cognitive Screening: Normal - Mini Cog Test    Health Maintenance Due   There are no preventive care reminders to display for this patient. Patient Care Team   Patient Care Team:  Milagro Thrasher MD as PCP - General (Internal Medicine)  Milagro Thrasher MD as PCP - [facility] Empaneled Provider    History     Patient Active Problem List   Diagnosis Code    Colon polyp Dr. Dalia Mccain  K63.5    Dyslipidemia E78.5    Arthritis, degenerative Dr Naz Avila M19.90    BPH with obstruction/lower urinary tract symptoms N40.1, N13.8    History of prostate cancer Z85.46     Past Medical History:   Diagnosis Date    BPH with obstruction/lower urinary tract symptoms 2/23/2017    Colon polyp     Dr. Dalia Mccain 2009; Dr Aniket Aleman 9/7/18    Degenerative arthritis of cervical spine     Dr. Karina Hollis cervical radiculopathy 2012; mri 5/17 showed multilevel dz with ankylosis C3-5    Degenerative arthritis of thoracic spine     MRI    Detrusor and sphincter dyssynergia     Dr. Cassidy Canas in past    Dyslipidemia     FHx: colon cancer     Frozen shoulder syndrome 2013    Dr. Jose Estes GERD (gastroesophageal reflux disease)     HSV (herpes simplex virus) infection     Insomnia     Osteoarthritis     Dr. Andrey Harris hips/knees/hands    Overweight (BMI 25.0-29. 9)     IF 3/19 start weight 165 lbs    Prostate cancer ([facility] [address] 05/2020    Dr Cem Villarreal;  [id number] GS 4+4 prostate cancer in 5/20 cores, S/p TRUS Bx 1/4/21. Prostate vol. 58.59cc.; s/p RALP and LN dissection (3/21)    Syncope     Dr. Tee Harmon 2009 neg holter and w/u      Past Surgical History:   Procedure Laterality Date    HX CATARACT REMOVAL  2012    Dr. Dejuan Rubin 2009 polyp; 2013; Dr Nav Matthews 9/7/18 polyp     HX ROTATOR CUFF REPAIR      2014, 2018    HX UROLOGICAL      s/p RALP w bilat LN disseciton 3/18/21 Dr Trent Butcher  7/14    CT chest neg for nodule     Current Outpatient Medications   Medication Sig Dispense Refill    zolpidem (AMBIEN) 10 mg tablet Take 1 Tablet by mouth nightly as needed for Sleep. Max Daily Amount: 10 mg. 30 Tablet 2    atorvastatin (LIPITOR) 10 mg tablet Take 1 Tablet by mouth daily. 90 Tablet 3    atorvastatin (LIPITOR) 10 mg tablet take 1 tablet by mouth once daily 90 Tablet 3    triamcinolone (ARISTOCORT) 0.5 % topical cream Apply  to affected area two (2) times a day. use thin layer 30 g 1    Syringe with Needle, Disp, (Allergy Syringe) 1 mL 27 x 1/2\" syrg FOR USE WITH INTRACAVERNOSAL INJECTIONS. 25 Pen Needle 2    Injector Device francis DISPENSE INJECT-EASE AUTO-INJECTOR FOR USE WITH BI-MIX INJECTIONS. 1 Each 1    bi mix compound 0.6 mL by IntraCAVernosal route as needed (FOR INTRACAVERNOSAL USE). DISPENSE  COMPOUNDED BI-MIX (Papaverine 30 mg/ Phentolamine 1 mg/ml) 10 mL 5    tadalafiL (CIALIS) 5 mg tablet Daily 90 Tablet 3    omeprazole (PRILOSEC) 40 mg capsule Take 1 Capsule by mouth daily. 90 Capsule 3    multivitamin (ONE A DAY) tablet Take 1 Tab by mouth daily.        Allergies   Allergen Reactions    Flomax [Tamsulosin] Vertigo    Uroxatral [Alfuzosin] Vertigo     And fainting       Family History   Problem Relation Age of Onset    Cancer Mother         colon    Cancer Sister         breast     Social History     Tobacco Use    Smoking status: Never Smoker    Smokeless tobacco: Never Used   Substance Use Topics    Alcohol use: Chioma Soto MD

## 2022-03-06 NOTE — PROGRESS NOTES
66 y.o. white male who presents for evaluation    Seems to be doing well from the prostate cancer and being followed by Dr Anna Perry. Reports no cardiovascular complaints, he's back to biking routinely. The hip issues resolved although sometimes he has back problems although no radicular complaints    No GI complaints. Needs refill of the ambien as he has regular issues with insomnia. He's been having some dizziness spells. Also, reports maybe some inc hearing loss and tinnitus    *LAST MEDICARE WELLNESS EXAM: 2/5/15, 2/12/16, 2/23/17, 3/1/18, 3/11/19, 3/16/20, 3/11/21, 3/18/22    Past Medical History:   Diagnosis Date    BPH with obstruction/lower urinary tract symptoms 2/23/2017    Colon polyp     Dr. Giacomo Hu 2009; Dr Adam Rahman 9/7/18    Degenerative arthritis of cervical spine     Dr. Jie Soriano cervical radiculopathy 2012; mri 5/17 showed multilevel dz with ankylosis C3-5    Degenerative arthritis of thoracic spine     MRI    Detrusor and sphincter dyssynergia     Dr. Lindsay House in past    Dyslipidemia     FHx: colon cancer     Frozen shoulder syndrome 2013    Dr. Boy Gamez GERD (gastroesophageal reflux disease)     HSV (herpes simplex virus) infection     Insomnia     Osteoarthritis     Dr. Jacky Bingham hips/knees/hands    Overweight (BMI 25.0-29. 9)     IF 3/19 start weight 165 lbs    Prostate cancer ([facility] [address] 05/2020    Dr Anna Perry;  [id number] GS 4+4 prostate cancer in 5/20 cores, S/p TRUS Bx 1/4/21.  Prostate vol. 58.59cc.; s/p RALP and LN dissection (3/21)    Syncope     Dr. Reyna Salter 2009 neg holter and w/u     Past Surgical History:   Procedure Laterality Date    HX CATARACT REMOVAL  2012    Dr. Mahin Hu 2009 polyp; 2013; Dr Adam Rahman 9/7/18 polyp     HX ROTATOR CUFF REPAIR      2014, 2018    HX UROLOGICAL      s/p RALP w bilat LN disseciton 3/18/21 Dr Nicole Bah  7/14    CT chest neg for nodule     Social History     Socioeconomic History    Marital status:      Spouse name: Not on file    Number of children: Not on file    Years of education: Not on file    Highest education level: Not on file   Occupational History    Occupation: ret , owns painting co   Tobacco Use    Smoking status: Never Smoker    Smokeless tobacco: Never Used   Substance and Sexual Activity    Alcohol use: No    Drug use: No    Sexual activity: Not on file   Other Topics Concern    Not on file   Social History Narrative    Not on file     Social Determinants of Health     Financial Resource Strain:     Difficulty of Paying Living Expenses: Not on file   Food Insecurity:     Worried About [address] in the Last Year: Not on file    [address] in the Last Year: Not on file   Transportation Needs:     Lack of Transportation (Medical): Not on file    Lack of Transportation (Non-Medical):  Not on file   Physical Activity:     Days of Exercise per Week: Not on file    Minutes of Exercise per Session: Not on file   Stress:     Feeling of Stress : Not on file   Social Connections:     Frequency of Communication with Friends and Family: Not on file    Frequency of Social Gatherings with Friends and Family: Not on file    Attends Cheondo[employer] Services: Not on file    Active Member of [address] [place] [address] [employer] or Organizations: Not on file    Attends Club or Organization Meetings: Not on file    Marital Status: Not on file   Intimate Partner Violence:     Fear of Current or Ex-Partner: Not on file    Emotionally Abused: Not on file    Physically Abused: Not on file    Sexually Abused: Not on file   Housing Stability:     Unable to Pay for Housing in the Last Year: Not on file    Number of [place] in the Last Year: Not on file    Unstable Housing in the Last Year: Not on file     Allergies   Allergen Reactions    Flomax [Tamsulosin] Vertigo    Uroxatral [Alfuzosin] Vertigo     And fainting      Current Outpatient Medications Medication Sig    zolpidem (AMBIEN) 10 mg tablet Take 1 Tablet by mouth nightly as needed for Sleep. Max Daily Amount: 10 mg.    atorvastatin (LIPITOR) 10 mg tablet Take 1 Tablet by mouth daily.  atorvastatin (LIPITOR) 10 mg tablet take 1 tablet by mouth once daily    triamcinolone (ARISTOCORT) 0.5 % topical cream Apply  to affected area two (2) times a day. use thin layer    Syringe with Needle, Disp, (Allergy Syringe) 1 mL 27 x 1/2\" syrg FOR USE WITH INTRACAVERNOSAL INJECTIONS.  Injector Device francis DISPENSE INJECT-EASE AUTO-INJECTOR FOR USE WITH BI-MIX INJECTIONS.  bi mix compound 0.6 mL by IntraCAVernosal route as needed (FOR INTRACAVERNOSAL USE). DISPENSE  COMPOUNDED BI-MIX (Papaverine 30 mg/ Phentolamine 1 mg/ml)    tadalafiL (CIALIS) 5 mg tablet Daily    omeprazole (PRILOSEC) 40 mg capsule Take 1 Capsule by mouth daily.  multivitamin (ONE A DAY) tablet Take 1 Tab by mouth daily. No current facility-administered medications for this visit. REVIEW OF SYSTEMS: colo 9/18 Dr Alejandro Baca, sees Dr Qiana Munoz  Ophtho - no vision change or eye pain  Oral - no mouth pain, tongue or tooth problems  Ears - no hearing loss, ear pain, fullness, no swallowing problems  Cardiac - no CP, PND, orthopnea, edema, palpitations or syncope  Chest - no breast masses  Resp - no wheezing, chronic coughing, dyspnea  GI - no heartburn, nausea, vomiting, change in bowel habits, bleeding, hemorrhoids  Urinary - no dysuria, hematuria, flank pain, urgency, frequency    Visit Vitals  /65   Pulse 60   Temp 97.5 °F (36.4 °C) (Temporal)   Resp 16   Ht 5' 8\" (1.727 m)   Wt 151 lb (68.5 kg)   SpO2 98%   BMI 22.96 kg/m²   A&O x3  Affect is appropriate. Mood stable  No apparent distress  Anicteric, no JVD, adenopathy or thyromegaly. No carotid bruits or radiated murmur  Lungs clear to auscultation, no wheezes or rales  Heart showed regular rate and rhythm.  No murmur, rubs, gallops  Abdomen soft nontender, no hepatosplenomegaly or masses. Extremities without edema.   Pulses 1-2+ symmetrically    LABS  From 2/11 showed gluc 85, cr 0.80,             alt 23,                   chol 182, tg 123, hdl 37, ldl-c 120, wbc 4.5, hb 13.8, plt 257, psa 1.80       From 2/12 showed gluc 84, cr 0.70,             alt 19, ldl-p 1679                                                         wbc 4.0, hb 13.1, plt 266, psa 2.03  From 3/13 showed gluc 86, cr 0.80,             alt 19, ldl-p 1779, chol 171, tg 93,   hdl 42, ldl-c 110, tsh 0.85  From 9/13 showed                        ldl-p 1809, chol 192, tg 92,   hdl 49, ldl-c 125  From 1/14 showed gluc 84, cr 0.90, gfr>60, alt 41, ldl-p 1062, chol 131, tg 120, hdl 41, ldl-c 66,   tsh 0.85  From 6/14 showed gluc 83, cr 0.80, gfr>60,                   wbc 4.5, hb 14.0, plt 231  From 2/15 showed           chol 123, tg 79,   hdl 42, ldl-c 66,          psa 2.78  From 2/16 showed gluc 87, cr 0.85, gfr>60, alt 33,      chol 121, tg 103, hdl 34, ldl-c 66,   wbc 4.7, hb 14.2, plt 281, psa 3.20  From 2/17 showed gluc 85, cr 0.85, gfr>60, alt 32,       chol 127, tg 63,   hdl 54, ldl-c 60,   wbc 4.3, hb 14.9, plt 247, hep c neg  From 12/17 showed glu 96, cr 0.70, gfr>60,                   wbc 4.7, hb 14.1, plt 255  From 3/19 showed gluc 84, cr 0.81, gfr>60, alt 27,       chol 124, tg 77,   hdl 42, ldl-c 67,   wbc 4.3, hb 14.3, plt 253  From 3/20 showed gluc 80, cr 0.76, gfr>60, alt 27,       chol 138, tg 81,   hdl 46, ldl-c 76,   wbc 5.4, hb 14.4, plt 246, psa 6.70  From 3/21 showed gluc 93, cr 0.80, gfr>60, alt 27,       chol 156, tg 123, hdl 46, ldl-c 85,   wbc 4.9, hb 15.1, plt 284    Results for orders placed or performed during the hospital encounter of 78/16/13   METABOLIC PANEL, COMPREHENSIVE   Result Value Ref Range    Sodium 140 136 - 145 mmol/L    Potassium 4.0 3.5 - 5.5 mmol/L    Chloride 105 100 - 111 mmol/L    CO2 32 21 - 32 mmol/L    Anion gap 3 3.0 - 18 mmol/L    Glucose 80 74 - 99 mg/dL BUN 21 (H) 7.0 - 18 MG/DL    Creatinine 0.90 0.6 - 1.3 MG/DL    BUN/Creatinine ratio 23 (H) 12 - 20      GFR est AA >60 >60 ml/min/1.73m2    GFR est non-AA >60 >60 ml/min/1.73m2    Calcium 9.5 8.5 - 10.1 MG/DL    Bilirubin, total 0.7 0.2 - 1.0 MG/DL    ALT (SGPT) 25 16 - 61 U/L    AST (SGOT) 20 10 - 38 U/L    Alk. phosphatase 53 45 - 117 U/L    Protein, total 7.1 6.4 - 8.2 g/dL    Albumin 4.3 3.4 - 5.0 g/dL    Globulin 2.8 2.0 - 4.0 g/dL    A-G Ratio 1.5 0.8 - 1.7     LIPID PANEL   Result Value Ref Range    LIPID PROFILE          Cholesterol, total 137 <200 MG/DL    Triglyceride 72 <150 MG/DL    HDL Cholesterol 45 40 - 60 MG/DL    LDL, calculated 77.6 0 - 100 MG/DL    VLDL, calculated 14.4 MG/DL    CHOL/HDL Ratio 3.0 0 - 5.0     CBC W/O DIFF   Result Value Ref Range    WBC 3.8 (L) 4.6 - 13.2 K/uL    RBC 4.48 4.35 - 5.65 M/uL    HGB 13.7 13.0 - 16.0 g/dL    HCT 42.1 36.0 - 48.0 %    MCV 94.0 78.0 - 100.0 FL    MCH 30.6 24.0 - 34.0 PG    MCHC 32.5 31.0 - 37.0 g/dL    RDW 13.1 11.6 - 14.5 %    PLATELET 882 960 - 899 K/uL    MPV 9.6 9.2 - 11.8 FL    NRBC 0.0 0  WBC    ABSOLUTE NRBC 0.00 0.00 - 0.01 K/uL       We reviewed the patient's labs from the last several visits to point out trends in the numbers        Patient Active Problem List   Diagnosis Code    Colon polyp Dr. Lia Sharma  K63.5    Dyslipidemia E78.5    Arthritis, degenerative Dr Michael Bond M19.90    BPH with obstruction/lower urinary tract symptoms N40.1, N13.8    History of prostate cancer Z85.46     Assessment and plan:  1. GERD. ppi and avoidance measures  2. Dyslipidemia. Continue lipitor  3. Colon polyp and FH colon ca. Fiber, f/u 2023  4. Prostate ca. Follow up Dr Ronaldo Soulier as scheduled  5. Overweight. Lifestyle and dietary measures. Portion control reiterated. 6.  Arthritis. F/u ortho as directed    NEW ISSUES  7. Hearing loss, tinnitus, dizziness.  Second opinion Dr Azul Collins        RTC 3/22    Above conditions discussed at length and patient vocalized understanding. All questions answered to patient satisfaction        ICD-10-CM ICD-9-CM    1. Bilateral hearing loss, unspecified hearing loss type  H91.93 389.9 REFERRAL TO ENT-OTOLARYNGOLOGY   2. Dizziness  R42 780.4 REFERRAL TO ENT-OTOLARYNGOLOGY   3. Tinnitus, unspecified laterality  H93.19 388.30 REFERRAL TO ENT-OTOLARYNGOLOGY   4. Insomnia, unspecified type  G47.00 780.52 zolpidem (AMBIEN) 10 mg tablet   5. Skin lesion  L98.9 709.9 REFERRAL TO DERMATOLOGY   6. Primary osteoarthritis involving multiple joints  M89.49 715.98    7. Hyperplastic colonic polyp, unspecified part of colon  K63.5 211.3    8. Dyslipidemia  E78.5 272.4 CBC W/O DIFF      METABOLIC PANEL, COMPREHENSIVE      LIPID PANEL   9.  History of prostate cancer  Z85.46 V10.46

## 2022-03-16 ENCOUNTER — PATIENT MESSAGE (OUTPATIENT)
Dept: INTERNAL MEDICINE CLINIC | Age: 72
End: 2022-03-16

## 2022-03-18 ENCOUNTER — OFFICE VISIT (OUTPATIENT)
Dept: INTERNAL MEDICINE CLINIC | Age: 72
End: 2022-03-18
Payer: MEDICARE

## 2022-03-18 VITALS
DIASTOLIC BLOOD PRESSURE: 65 MMHG | OXYGEN SATURATION: 98 % | HEIGHT: 68 IN | WEIGHT: 151 LBS | BODY MASS INDEX: 22.88 KG/M2 | SYSTOLIC BLOOD PRESSURE: 118 MMHG | RESPIRATION RATE: 16 BRPM | HEART RATE: 60 BPM | TEMPERATURE: 97.5 F

## 2022-03-18 DIAGNOSIS — M15.9 PRIMARY OSTEOARTHRITIS INVOLVING MULTIPLE JOINTS: ICD-10-CM

## 2022-03-18 DIAGNOSIS — R42 DIZZINESS: ICD-10-CM

## 2022-03-18 DIAGNOSIS — Z85.46 HISTORY OF PROSTATE CANCER: ICD-10-CM

## 2022-03-18 DIAGNOSIS — L98.9 SKIN LESION: ICD-10-CM

## 2022-03-18 DIAGNOSIS — H91.93 BILATERAL HEARING LOSS, UNSPECIFIED HEARING LOSS TYPE: ICD-10-CM

## 2022-03-18 DIAGNOSIS — E78.5 DYSLIPIDEMIA: ICD-10-CM

## 2022-03-18 DIAGNOSIS — Z13.1 SCREENING FOR DIABETES MELLITUS: ICD-10-CM

## 2022-03-18 DIAGNOSIS — Z00.00 MEDICARE ANNUAL WELLNESS VISIT, SUBSEQUENT: Primary | ICD-10-CM

## 2022-03-18 DIAGNOSIS — H93.19 TINNITUS, UNSPECIFIED LATERALITY: ICD-10-CM

## 2022-03-18 DIAGNOSIS — K63.5 HYPERPLASTIC COLONIC POLYP, UNSPECIFIED PART OF COLON: ICD-10-CM

## 2022-03-18 DIAGNOSIS — G47.00 INSOMNIA, UNSPECIFIED TYPE: ICD-10-CM

## 2022-03-18 PROBLEM — N40.1 BPH WITH OBSTRUCTION/LOWER URINARY TRACT SYMPTOMS: Status: ACTIVE | Noted: 2017-02-23

## 2022-03-18 PROBLEM — N13.8 BPH WITH OBSTRUCTION/LOWER URINARY TRACT SYMPTOMS: Status: ACTIVE | Noted: 2017-02-23

## 2022-03-18 PROCEDURE — G8536 NO DOC ELDER MAL SCRN: HCPCS | Performed by: INTERNAL MEDICINE

## 2022-03-18 PROCEDURE — 1101F PT FALLS ASSESS-DOCD LE1/YR: CPT | Performed by: INTERNAL MEDICINE

## 2022-03-18 PROCEDURE — G8510 SCR DEP NEG, NO PLAN REQD: HCPCS | Performed by: INTERNAL MEDICINE

## 2022-03-18 PROCEDURE — 3017F COLORECTAL CA SCREEN DOC REV: CPT | Performed by: INTERNAL MEDICINE

## 2022-03-18 PROCEDURE — 99214 OFFICE O/P EST MOD 30 MIN: CPT | Performed by: INTERNAL MEDICINE

## 2022-03-18 PROCEDURE — G0463 HOSPITAL OUTPT CLINIC VISIT: HCPCS | Performed by: INTERNAL MEDICINE

## 2022-03-18 PROCEDURE — G8427 DOCREV CUR MEDS BY ELIG CLIN: HCPCS | Performed by: INTERNAL MEDICINE

## 2022-03-18 PROCEDURE — G8420 CALC BMI NORM PARAMETERS: HCPCS | Performed by: INTERNAL MEDICINE

## 2022-03-18 PROCEDURE — G0439 PPPS, SUBSEQ VISIT: HCPCS | Performed by: INTERNAL MEDICINE

## 2022-03-18 NOTE — PROGRESS NOTES
Syed Do presents with   Chief Complaint   Patient presents with    Annual Wellness Visit    Cholesterol Problem    Labs     3-4-22    Dizziness     X 2 months off and on, patient thinks it may be a post covid symptom            1. \"Have you been to the ER, urgent care clinic since your last visit? Hospitalized since your last visit? \" YES 3-17-21 for prostate surgery at [facility] and 3-18-22 for Quesada catheter displacement    2. \"Have you seen or consulted any other health care providers outside of the [address] [place] [address] since your last visit? \" Binnie Needs for ED and urology    3. For patients aged 39-70: Has the patient had a colonoscopy? Yes - no Care Gap present     If the patient is female:    4. For patients aged 41-77: Has the patient had a mammogram within the past 2 years? NA - based on age    11. For patients aged 21-65: Has the patient had a pap smear?  NA - based on age

## 2022-03-19 PROBLEM — C61 PROSTATE CANCER (HCC): Status: ACTIVE | Noted: 2021-03-12

## 2022-03-20 PROBLEM — Z85.46 HISTORY OF PROSTATE CANCER: Status: ACTIVE | Noted: 2021-03-12

## 2022-03-20 RX ORDER — ZOLPIDEM TARTRATE 10 MG/1
10 TABLET ORAL
Qty: 30 TABLET | Refills: 2 | Status: SHIPPED | OUTPATIENT
Start: 2022-03-20

## 2022-03-20 NOTE — PATIENT INSTRUCTIONS
Medicare Wellness Visit, Male    The best way to live healthy is to have a lifestyle where you eat a well-balanced diet, exercise regularly, limit alcohol use, and quit all forms of tobacco/nicotine, if applicable. Regular preventive services are another way to keep healthy. Preventive services (vaccines, screening tests, monitoring & exams) can help personalize your care plan, which helps you manage your own care. Screening tests can find health problems at the earliest stages, when they are easiest to treat. [facility] follows the current, evidence-based guidelines published by the [facility] Piyush Nathan ([facility]) when recommending preventive services for our patients. Because we follow these guidelines, sometimes recommendations change over time as research supports it. (For example, a prostate screening blood test is no longer routinely recommended for men with no symptoms). Of course, you and your doctor may decide to screen more often for some diseases, based on your risk and co-morbidities (chronic disease you are already diagnosed with). Preventive services for you include:  - Medicare offers their members a free annual wellness visit, which is time for you and your primary care provider to discuss and plan for your preventive service needs. Take advantage of this benefit every year!  -All adults over age 72 should receive the recommended pneumonia vaccines. Current USPSTF guidelines recommend a series of two vaccines for the best pneumonia protection.   -All adults should have a flu vaccine yearly and tetanus vaccine every 10 years.  -All adults age 48 and older should receive the shingles vaccines (series of two vaccines).        -All adults age 38-68 who are overweight should have a diabetes screening test once every three years.   -Other screening tests & preventive services for persons with diabetes include: an eye exam to screen for diabetic retinopathy, a kidney function test, a foot exam, and stricter control over your cholesterol.   -Cardiovascular screening for adults with routine risk involves an electrocardiogram (ECG) at intervals determined by the provider.   -Colorectal cancer screening should be done for adults age 54-65 with no increased risk factors for colorectal cancer. There are a number of acceptable methods of screening for this type of cancer. Each test has its own benefits and drawbacks. Discuss with your provider what is most appropriate for you during your annual wellness visit. The different tests include: colonoscopy (considered the best screening method), a fecal occult blood test, a fecal DNA test, and sigmoidoscopy.  -All adults born between [facility] should be screened once for Hepatitis C.  -An Abdominal Aortic Aneurysm (AAA) Screening is recommended for men age 73-68 who has ever smoked in their lifetime. Here is a list of your current Health Maintenance items (your personalized list of preventive services) with a due date: There are no preventive care reminders to display for this patient.

## 2022-03-24 PROBLEM — Z85.46 HISTORY OF PROSTATE CANCER: Status: ACTIVE | Noted: 2021-03-12

## 2022-10-10 RX ORDER — OMEPRAZOLE 40 MG/1
40 CAPSULE, DELAYED RELEASE ORAL DAILY
Qty: 90 CAPSULE | Refills: 3 | Status: SHIPPED | OUTPATIENT
Start: 2022-10-10

## 2022-10-10 NOTE — TELEPHONE ENCOUNTER
Last Visit: 3/18/22 with MD Rickey Hu  Next Appointment: 3/23/23 with MD Rickey Hu  Previous Refill Encounter(s): 7/12/21 #90 with 3 refills    Requested Prescriptions     Pending Prescriptions Disp Refills    omeprazole (PRILOSEC) 40 mg capsule 90 Capsule 3     Sig: Take 1 Capsule by mouth daily. For [address] [facility] in place:   Recommendation Provided To:    Intervention Detail: New Rx: 1, reason: Patient Preference  Gap Closed?:   Intervention Accepted By:   Time Spent (min): 5

## 2023-01-01 DIAGNOSIS — E78.5 DYSLIPIDEMIA: ICD-10-CM

## 2023-01-04 RX ORDER — ATORVASTATIN CALCIUM 10 MG/1
10 TABLET, FILM COATED ORAL DAILY
Qty: 90 TABLET | Refills: 3 | Status: SHIPPED | OUTPATIENT
Start: 2023-01-04

## 2023-03-15 DIAGNOSIS — E78.5 DYSLIPIDEMIA: Primary | ICD-10-CM

## 2023-03-16 ENCOUNTER — HOSPITAL ENCOUNTER (OUTPATIENT)
Facility: HOSPITAL | Age: 73
Setting detail: SPECIMEN
Discharge: HOME | End: 2023-03-16
Payer: MEDICARE

## 2023-03-16 ENCOUNTER — TELEPHONE (OUTPATIENT)
Age: 73
End: 2023-03-16

## 2023-03-16 DIAGNOSIS — G47.00 INSOMNIA, UNSPECIFIED TYPE: Primary | ICD-10-CM

## 2023-03-16 DIAGNOSIS — E78.5 DYSLIPIDEMIA: ICD-10-CM

## 2023-03-16 LAB
ALBUMIN SERPL-MCNC: 4.4 G/DL (ref 3.4–5)
ALBUMIN/GLOB SERPL: 1.7 (ref 0.8–1.7)
ALP SERPL-CCNC: 55 U/L (ref 45–117)
ALT SERPL-CCNC: 25 U/L (ref 16–61)
ANION GAP SERPL CALC-SCNC: 3 MMOL/L (ref 3–18)
AST SERPL-CCNC: 26 U/L (ref 10–38)
BILIRUB SERPL-MCNC: 1 MG/DL (ref 0.2–1)
BUN SERPL-MCNC: 17 MG/DL (ref 7–18)
BUN/CREAT SERPL: 21 (ref 12–20)
CALCIUM SERPL-MCNC: 9.9 MG/DL (ref 8.5–10.1)
CHLORIDE SERPL-SCNC: 106 MMOL/L (ref 100–111)
CHOLEST SERPL-MCNC: 150 MG/DL
CO2 SERPL-SCNC: 30 MMOL/L (ref 21–32)
CREAT SERPL-MCNC: 0.82 MG/DL (ref 0.6–1.3)
ERYTHROCYTE [DISTWIDTH] IN BLOOD BY AUTOMATED COUNT: 12.9 % (ref 11.6–14.5)
GLOBULIN SER CALC-MCNC: 2.6 G/DL (ref 2–4)
GLUCOSE SERPL-MCNC: 81 MG/DL (ref 74–99)
HCT VFR BLD AUTO: 43.7 % (ref 36–48)
HDLC SERPL-MCNC: 48 MG/DL (ref 40–60)
HDLC SERPL: 3.1 (ref 0–5)
HGB BLD-MCNC: 14.5 G/DL (ref 13–16)
LDLC SERPL CALC-MCNC: 87.4 MG/DL (ref 0–100)
LIPID PANEL: NORMAL
MCH RBC QN AUTO: 31.4 PG (ref 24–34)
MCHC RBC AUTO-ENTMCNC: 33.2 G/DL (ref 31–37)
MCV RBC AUTO: 94.6 FL (ref 78–100)
NRBC # BLD: 0 K/UL (ref 0–0.01)
NRBC BLD-RTO: 0 PER 100 WBC
PLATELET # BLD AUTO: 267 K/UL (ref 135–420)
PMV BLD AUTO: 9.4 FL (ref 9.2–11.8)
POTASSIUM SERPL-SCNC: 4.3 MMOL/L (ref 3.5–5.5)
PROT SERPL-MCNC: 7 G/DL (ref 6.4–8.2)
RBC # BLD AUTO: 4.62 M/UL (ref 4.35–5.65)
SODIUM SERPL-SCNC: 139 MMOL/L (ref 136–145)
TRIGL SERPL-MCNC: 73 MG/DL
VLDLC SERPL CALC-MCNC: 14.6 MG/DL
WBC # BLD AUTO: 4.7 K/UL (ref 4.6–13.2)

## 2023-03-16 PROCEDURE — 80061 LIPID PANEL: CPT

## 2023-03-16 PROCEDURE — 85027 COMPLETE CBC AUTOMATED: CPT

## 2023-03-16 PROCEDURE — 80053 COMPREHEN METABOLIC PANEL: CPT

## 2023-03-16 PROCEDURE — 36415 COLL VENOUS BLD VENIPUNCTURE: CPT

## 2023-03-16 RX ORDER — ZOLPIDEM TARTRATE 10 MG/1
10 TABLET ORAL NIGHTLY PRN
Qty: 30 TABLET | Refills: 2 | Status: SHIPPED | OUTPATIENT
Start: 2023-03-16 | End: 2023-06-14

## 2023-03-17 SDOH — HEALTH STABILITY: PHYSICAL HEALTH: ON AVERAGE, HOW MANY MINUTES DO YOU ENGAGE IN EXERCISE AT THIS LEVEL?: 60 MIN

## 2023-03-17 SDOH — HEALTH STABILITY: PHYSICAL HEALTH: ON AVERAGE, HOW MANY DAYS PER WEEK DO YOU ENGAGE IN MODERATE TO STRENUOUS EXERCISE (LIKE A BRISK WALK)?: 2 DAYS

## 2023-03-17 ASSESSMENT — LIFESTYLE VARIABLES
HOW MANY STANDARD DRINKS CONTAINING ALCOHOL DO YOU HAVE ON A TYPICAL DAY: 0
HOW MANY STANDARD DRINKS CONTAINING ALCOHOL DO YOU HAVE ON A TYPICAL DAY: PATIENT DOES NOT DRINK
HOW OFTEN DO YOU HAVE A DRINK CONTAINING ALCOHOL: 1
HOW OFTEN DO YOU HAVE SIX OR MORE DRINKS ON ONE OCCASION: 1
HOW OFTEN DO YOU HAVE A DRINK CONTAINING ALCOHOL: NEVER

## 2023-03-17 ASSESSMENT — PATIENT HEALTH QUESTIONNAIRE - PHQ9
SUM OF ALL RESPONSES TO PHQ QUESTIONS 1-9: 0
SUM OF ALL RESPONSES TO PHQ QUESTIONS 1-9: 0
1. LITTLE INTEREST OR PLEASURE IN DOING THINGS: 0
SUM OF ALL RESPONSES TO PHQ9 QUESTIONS 1 & 2: 0
SUM OF ALL RESPONSES TO PHQ QUESTIONS 1-9: 0
SUM OF ALL RESPONSES TO PHQ QUESTIONS 1-9: 0
2. FEELING DOWN, DEPRESSED OR HOPELESS: 0

## 2023-03-23 ENCOUNTER — OFFICE VISIT (OUTPATIENT)
Age: 73
End: 2023-03-23
Payer: MEDICARE

## 2023-03-23 VITALS
HEART RATE: 60 BPM | TEMPERATURE: 97.1 F | RESPIRATION RATE: 16 BRPM | SYSTOLIC BLOOD PRESSURE: 118 MMHG | DIASTOLIC BLOOD PRESSURE: 67 MMHG | OXYGEN SATURATION: 100 % | WEIGHT: 157 LBS | BODY MASS INDEX: 23.79 KG/M2 | HEIGHT: 68 IN

## 2023-03-23 DIAGNOSIS — E78.5 DYSLIPIDEMIA: ICD-10-CM

## 2023-03-23 DIAGNOSIS — K63.5 POLYP OF COLON, UNSPECIFIED PART OF COLON, UNSPECIFIED TYPE: ICD-10-CM

## 2023-03-23 DIAGNOSIS — Z71.89 ADVANCED CARE PLANNING/COUNSELING DISCUSSION: ICD-10-CM

## 2023-03-23 DIAGNOSIS — Z00.00 MEDICARE ANNUAL WELLNESS VISIT, SUBSEQUENT: Primary | ICD-10-CM

## 2023-03-23 DIAGNOSIS — K21.9 GASTROESOPHAGEAL REFLUX DISEASE, UNSPECIFIED WHETHER ESOPHAGITIS PRESENT: ICD-10-CM

## 2023-03-23 DIAGNOSIS — Z85.46 HISTORY OF PROSTATE CANCER: ICD-10-CM

## 2023-03-23 DIAGNOSIS — M19.90 OSTEOARTHRITIS, UNSPECIFIED OSTEOARTHRITIS TYPE, UNSPECIFIED SITE: ICD-10-CM

## 2023-03-23 PROBLEM — N13.8 BPH WITH OBSTRUCTION/LOWER URINARY TRACT SYMPTOMS: Status: RESOLVED | Noted: 2017-02-23 | Resolved: 2023-03-23

## 2023-03-23 PROBLEM — N40.1 BPH WITH OBSTRUCTION/LOWER URINARY TRACT SYMPTOMS: Status: RESOLVED | Noted: 2017-02-23 | Resolved: 2023-03-23

## 2023-03-23 PROCEDURE — 99211 OFF/OP EST MAY X REQ PHY/QHP: CPT

## 2023-03-23 SDOH — ECONOMIC STABILITY: FOOD INSECURITY: WITHIN THE PAST 12 MONTHS, YOU WORRIED THAT YOUR FOOD WOULD RUN OUT BEFORE YOU GOT MONEY TO BUY MORE.: NEVER TRUE

## 2023-03-23 SDOH — ECONOMIC STABILITY: FOOD INSECURITY: WITHIN THE PAST 12 MONTHS, THE FOOD YOU BOUGHT JUST DIDN'T LAST AND YOU DIDN'T HAVE MONEY TO GET MORE.: NEVER TRUE

## 2023-03-23 SDOH — ECONOMIC STABILITY: HOUSING INSECURITY
IN THE LAST 12 MONTHS, WAS THERE A TIME WHEN YOU DID NOT HAVE A STEADY PLACE TO SLEEP OR SLEPT IN A SHELTER (INCLUDING NOW)?: NO

## 2023-03-23 SDOH — ECONOMIC STABILITY: INCOME INSECURITY: HOW HARD IS IT FOR YOU TO PAY FOR THE VERY BASICS LIKE FOOD, HOUSING, MEDICAL CARE, AND HEATING?: NOT HARD AT ALL

## 2023-03-23 ASSESSMENT — LIFESTYLE VARIABLES
HOW OFTEN DO YOU HAVE A DRINK CONTAINING ALCOHOL: NEVER
HOW MANY STANDARD DRINKS CONTAINING ALCOHOL DO YOU HAVE ON A TYPICAL DAY: PATIENT DOES NOT DRINK

## 2023-03-24 NOTE — PROGRESS NOTES
Jimmy Sampson presents today for     Chief Complaint   Patient presents with    Medicare AWV     Rm 8    Discuss Labs     Completed 3/16/2023           1. \"Have you been to the ER, urgent care clinic since your last visit? Hospitalized since your last visit? \" No.    2. \"Have you seen or consulted any other health care providers outside of the [address] [place] [address] since your last visit? \" Yes, Orthopedics. 3. For patients aged 39-70: Has the patient had a colonoscopy / FIT/ Cologuard? Yes      If the patient is female:    4. For patients aged 41-77: Has the patient had a mammogram within the past 2 years? N/a  See top three    5. For patients aged 21-65: Has the patient had a pap smear?  N/a
Deanna Hopkins MD   atorvastatin (LIPITOR) 10 MG tablet take 1 tablet by mouth once daily  Ar Automatic Reconciliation   omeprazole (PRILOSEC) 40 MG delayed release capsule Take 40 mg by mouth daily  Ar Automatic Reconciliation   triamcinolone (ARISTOCORT) 0.5 % cream Apply topically 2 times daily  Ar Automatic Reconciliation       CareTeam (Including outside providers/suppliers regularly involved in providing care):   Patient Care Team:  Elvira Molina MD as PCP - Ravi Arana MD as PCP - Empaneled Provider     Reviewed and updated this visit:  Tobacco  Allergies  Meds  Problems  Med Hx  Surg Hx  Soc Hx  Fam Hx               Samra Curtis MD
Value Ref Range    WBC 4.7 4.6 - 13.2 K/uL    RBC 4.62 4.35 - 5.65 M/uL    Hemoglobin 14.5 13.0 - 16.0 g/dL    Hematocrit 43.7 36.0 - 48.0 %    MCV 94.6 78.0 - 100.0 FL    MCH 31.4 24.0 - 34.0 PG    MCHC 33.2 31.0 - 37.0 g/dL    RDW 12.9 11.6 - 14.5 %    Platelets 244 165 - 022 K/uL    MPV 9.4 9.2 - 11.8 FL    Nucleated RBCs 0.0 0  WBC    nRBC 0.00 0.00 - 0.01 K/uL          We reviewed the patient's labs from the last several visits to point out trends in the numbers        Patient Active Problem List   Diagnosis    Arthritis, degenerative    Dyslipidemia    Colon polyp    History of prostate cancer    GERD (gastroesophageal reflux disease)          Assessment and plan:   1. GERD. ppi and avoidance measures   2. Dyslipidemia. Continue lipitor   3. Colon polyp and FH colon ca. Fiber, f/u 2023   4. Prostate ca. Follow up Dr Estrella as scheduled   5. Osteoarthritis. F/u Dr Demarcus Herrera as scheduled            RTC 3/24      Above conditions discussed at length and patient vocalized understanding. All questions answered to patient satisfaction     Diagnosis Orders   1. Polyp of colon, unspecified part of colon, unspecified type  External Referral To Gastroenterology      2. Osteoarthritis, unspecified osteoarthritis type, unspecified site        3. Dyslipidemia  CBC with Auto Differential    Comprehensive Metabolic Panel    Lipid Panel      4. History of prostate cancer        5. Gastroesophageal reflux disease, unspecified whether esophagitis present  External Referral To Gastroenterology      6. Advanced care planning/counseling discussion        7.  Medicare annual wellness visit, subsequent

## 2023-03-24 NOTE — ACP (ADVANCE CARE PLANNING)
Advance Care Planning     Advance Care Planning (ACP) Physician/NP/PA Conversation    Date of Conversation: 3/23/2023  Conducted with: Patient with Decision Making Capacity    Healthcare Decision Maker:      Primary Decision Maker: Kevin Garcia - [phone]    Click here to complete [address] including selection of the Healthcare Decision Maker Relationship (ie \"Primary\")  Today we documented Decision Maker(s) consistent with Legal Next of Kin hierarchy. Care Preferences:    Hospitalization: \"If your health worsens and it becomes clear that your chance of recovery is unlikely, what would be your preference regarding hospitalization? \"  The patient would prefer hospitalization. Ventilation: \"If you were unable to breath on your own and your chance of recovery was unlikely, what would be your preference about the use of a ventilator (breathing machine) if it was available to you? \"  The patient would desire the use of a ventilator. Resuscitation: \"In the event your heart stopped as a result of an underlying serious health condition, would you want attempts made to restart your heart, or would you prefer a natural death? \"  Yes, attempt to resuscitate.     ventilation preferences, hospitalization preferences, and resuscitation preferences    Conversation Outcomes / Follow-Up Plan:  ACP in process - information provided, considering goals and options  Reviewed DNR/DNI and patient elects Full Code (Attempt Resuscitation)    Length of Voluntary ACP Conversation in minutes:  16 minutes    Maurice Mckeon MD

## 2023-03-24 NOTE — PATIENT INSTRUCTIONS
eye disorders. A preventive dental visit is recommended every 6 months. Try to get at least 150 minutes of exercise per week or 10,000 steps per day on a pedometer . Order or download the FREE \"Exercise & Physical Activity: Your Everyday Guide\" from [employer]. Call 5-137.957.3006 or search [employer] online. You need 0052-0068 mg of calcium and 6951-1327 IU of vitamin D per day. It is possible to meet your calcium requirement with diet alone, but a vitamin D supplement is usually necessary to meet this goal.  When exposed to the sun, use a sunscreen that protects against both UVA and UVB radiation with an SPF of 30 or greater. Reapply every 2 to 3 hours or after sweating, drying off with a towel, or swimming. Always wear a seat belt when traveling in a car. Always wear a helmet when riding a bicycle or motorcycle.

## 2023-07-09 DIAGNOSIS — K22.70 BARRETT'S ESOPHAGUS WITHOUT DYSPLASIA: Primary | ICD-10-CM

## 2023-11-03 RX ORDER — OMEPRAZOLE 40 MG/1
40 CAPSULE, DELAYED RELEASE ORAL DAILY
Qty: 90 CAPSULE | Refills: 2 | Status: SHIPPED | OUTPATIENT
Start: 2023-11-03

## 2024-02-07 RX ORDER — ATORVASTATIN CALCIUM 10 MG/1
TABLET, FILM COATED ORAL
Qty: 90 TABLET | Refills: 3 | Status: SHIPPED | OUTPATIENT
Start: 2024-02-07

## 2024-03-18 ENCOUNTER — HOSPITAL ENCOUNTER (OUTPATIENT)
Facility: HOSPITAL | Age: 74
Setting detail: SPECIMEN
Discharge: HOME | End: 2024-03-21
Payer: MEDICARE

## 2024-03-18 DIAGNOSIS — E78.5 DYSLIPIDEMIA: ICD-10-CM

## 2024-03-18 LAB
ALBUMIN SERPL-MCNC: 4.2 G/DL (ref 3.4–5)
ALBUMIN/GLOB SERPL: 1.5 (ref 0.8–1.7)
ALP SERPL-CCNC: 58 U/L (ref 45–117)
ALT SERPL-CCNC: 25 U/L (ref 16–61)
ANION GAP SERPL CALC-SCNC: 5 MMOL/L (ref 3–18)
AST SERPL-CCNC: 24 U/L (ref 10–38)
BASOPHILS # BLD: 0 K/UL (ref 0–0.1)
BASOPHILS NFR BLD: 1 % (ref 0–2)
BILIRUB SERPL-MCNC: 0.8 MG/DL (ref 0.2–1)
BUN SERPL-MCNC: 15 MG/DL (ref 7–18)
BUN/CREAT SERPL: 17 (ref 12–20)
CALCIUM SERPL-MCNC: 9.7 MG/DL (ref 8.5–10.1)
CHLORIDE SERPL-SCNC: 105 MMOL/L (ref 100–111)
CHOLEST SERPL-MCNC: 137 MG/DL
CO2 SERPL-SCNC: 32 MMOL/L (ref 21–32)
CREAT SERPL-MCNC: 0.89 MG/DL (ref 0.6–1.3)
DIFFERENTIAL METHOD BLD: ABNORMAL
EOSINOPHIL # BLD: 0.3 K/UL (ref 0–0.4)
EOSINOPHIL NFR BLD: 7 % (ref 0–5)
ERYTHROCYTE [DISTWIDTH] IN BLOOD BY AUTOMATED COUNT: 12.8 % (ref 11.6–14.5)
GLOBULIN SER CALC-MCNC: 2.8 G/DL (ref 2–4)
GLUCOSE SERPL-MCNC: 88 MG/DL (ref 74–99)
HCT VFR BLD AUTO: 41.2 % (ref 36–48)
HDLC SERPL-MCNC: 49 MG/DL (ref 40–60)
HDLC SERPL: 2.8 (ref 0–5)
HGB BLD-MCNC: 13.6 G/DL (ref 13–16)
IMM GRANULOCYTES # BLD AUTO: 0 K/UL (ref 0–0.04)
IMM GRANULOCYTES NFR BLD AUTO: 0 % (ref 0–0.5)
LDLC SERPL CALC-MCNC: 72.4 MG/DL (ref 0–100)
LIPID PANEL: NORMAL
LYMPHOCYTES # BLD: 1.2 K/UL (ref 0.9–3.6)
LYMPHOCYTES NFR BLD: 24 % (ref 21–52)
MCH RBC QN AUTO: 30.7 PG (ref 24–34)
MCHC RBC AUTO-ENTMCNC: 33 G/DL (ref 31–37)
MCV RBC AUTO: 93 FL (ref 78–100)
MONOCYTES # BLD: 0.6 K/UL (ref 0.05–1.2)
MONOCYTES NFR BLD: 11 % (ref 3–10)
NEUTS SEG # BLD: 2.8 K/UL (ref 1.8–8)
NEUTS SEG NFR BLD: 57 % (ref 40–73)
NRBC # BLD: 0 K/UL (ref 0–0.01)
NRBC BLD-RTO: 0 PER 100 WBC
PLATELET # BLD AUTO: 286 K/UL (ref 135–420)
PMV BLD AUTO: 9.4 FL (ref 9.2–11.8)
POTASSIUM SERPL-SCNC: 4.6 MMOL/L (ref 3.5–5.5)
PROT SERPL-MCNC: 7 G/DL (ref 6.4–8.2)
RBC # BLD AUTO: 4.43 M/UL (ref 4.35–5.65)
SODIUM SERPL-SCNC: 142 MMOL/L (ref 136–145)
TRIGL SERPL-MCNC: 78 MG/DL
VLDLC SERPL CALC-MCNC: 15.6 MG/DL
WBC # BLD AUTO: 4.9 K/UL (ref 4.6–13.2)

## 2024-03-18 PROCEDURE — 80053 COMPREHEN METABOLIC PANEL: CPT

## 2024-03-18 PROCEDURE — 36415 COLL VENOUS BLD VENIPUNCTURE: CPT

## 2024-03-18 PROCEDURE — 80061 LIPID PANEL: CPT

## 2024-03-18 PROCEDURE — 85025 COMPLETE CBC W/AUTO DIFF WBC: CPT

## 2024-03-23 SDOH — HEALTH STABILITY: PHYSICAL HEALTH: ON AVERAGE, HOW MANY DAYS PER WEEK DO YOU ENGAGE IN MODERATE TO STRENUOUS EXERCISE (LIKE A BRISK WALK)?: 5 DAYS

## 2024-03-23 SDOH — HEALTH STABILITY: PHYSICAL HEALTH: ON AVERAGE, HOW MANY MINUTES DO YOU ENGAGE IN EXERCISE AT THIS LEVEL?: 150+ MIN

## 2024-03-23 ASSESSMENT — LIFESTYLE VARIABLES
HOW OFTEN DO YOU HAVE A DRINK CONTAINING ALCOHOL: 1
HOW OFTEN DO YOU HAVE A DRINK CONTAINING ALCOHOL: NEVER
HOW MANY STANDARD DRINKS CONTAINING ALCOHOL DO YOU HAVE ON A TYPICAL DAY: PATIENT DOES NOT DRINK
HOW MANY STANDARD DRINKS CONTAINING ALCOHOL DO YOU HAVE ON A TYPICAL DAY: 0
HOW OFTEN DO YOU HAVE SIX OR MORE DRINKS ON ONE OCCASION: 1

## 2024-03-23 ASSESSMENT — PATIENT HEALTH QUESTIONNAIRE - PHQ9
2. FEELING DOWN, DEPRESSED OR HOPELESS: NOT AT ALL
SUM OF ALL RESPONSES TO PHQ QUESTIONS 1-9: 0
SUM OF ALL RESPONSES TO PHQ9 QUESTIONS 1 & 2: 0
1. LITTLE INTEREST OR PLEASURE IN DOING THINGS: NOT AT ALL
SUM OF ALL RESPONSES TO PHQ QUESTIONS 1-9: 0

## 2024-03-23 NOTE — PROGRESS NOTES
73 y.o. male who presents for evaluation.     Doing well from the prostate cancer and being followed by Dr Roper/Alexander     Reports no cardiovascular complaints, he'll be back to biking outside when the weather warms up.  He goes 17 mi on the trails but he goes 7 mi on the stationary.     He saw Dr Mistry and had colo and egd mid 2023.  Remains on ppi but he continues to have intermittent dyspesia and stomach upset.  He now reports that he's been using nsaid and tylenol on a fairly regular basis.  No n/v, bleeding, constitutional complaints.  Takes Tums almost daily    He's using ambien prn but not sleeping all the way through when he does take it    *LAST MEDICARE WELLNESS EXAM: 2/5/15, 2/12/16, 2/23/17, 3/1/18, 3/11/19, 3/16/20, 3/11/21, 3/18/22, 3/23/23, 3/25/24    Past Medical History:   Diagnosis Date    Girard's esophagus 06/2023    Dr Mistry    BPH with obstruction/lower urinary tract symptoms 02/23/2017    Colon adenoma 09/2023    Dr Mistry    Colon polyp     Dr. Roblero 2009; Dr Arroyo 9/7/18    COVID-19 vaccine dose declined 03/2024    boosters    Degenerative arthritis of cervical spine     Dr. Emery cervical radiculopathy 2012; mri 5/17 showed multilevel dz with ankylosis C3-5    Degenerative arthritis of thoracic spine     MRI    Detrusor and sphincter dyssynergia     Dr. Torres in past    Dyslipidemia     ED (erectile dysfunction) 3/18/2021    FHx: colon cancer     Frozen shoulder syndrome 2013    Dr. Sharma    GERD (gastroesophageal reflux disease)     Hearing loss 2022    HSV (herpes simplex virus) infection     Insomnia     Osteoarthritis     Dr. Reynoso hips/knees/hands    Overweight (BMI 25.0-29.9)     IF 3/19 start weight 165 lbs    Prostate cancer (HCC) 05/2020    Dr Roper;  [id number] GS 4+4 prostate cancer in 5/20 cores, S/p TRUS Bx 1/4/21. Prostate vol. 58.59cc.; s/p RALP and LN dissection (3/21)    STD (sexually transmitted disease) 7/1/1995    genatal herpies    Syncope     Dr. Ruffin 2009 neg

## 2024-03-25 ENCOUNTER — OFFICE VISIT (OUTPATIENT)
Age: 74
End: 2024-03-25
Payer: MEDICARE

## 2024-03-25 VITALS
TEMPERATURE: 98.2 F | HEART RATE: 60 BPM | WEIGHT: 157 LBS | RESPIRATION RATE: 16 BRPM | SYSTOLIC BLOOD PRESSURE: 127 MMHG | OXYGEN SATURATION: 97 % | HEIGHT: 68 IN | BODY MASS INDEX: 23.79 KG/M2 | DIASTOLIC BLOOD PRESSURE: 54 MMHG

## 2024-03-25 DIAGNOSIS — K22.70 BARRETT'S ESOPHAGUS WITHOUT DYSPLASIA: ICD-10-CM

## 2024-03-25 DIAGNOSIS — Z71.89 ADVANCED CARE PLANNING/COUNSELING DISCUSSION: ICD-10-CM

## 2024-03-25 DIAGNOSIS — K63.5 POLYP OF COLON, UNSPECIFIED PART OF COLON, UNSPECIFIED TYPE: ICD-10-CM

## 2024-03-25 DIAGNOSIS — E78.5 DYSLIPIDEMIA: ICD-10-CM

## 2024-03-25 DIAGNOSIS — G47.00 INSOMNIA, UNSPECIFIED TYPE: ICD-10-CM

## 2024-03-25 DIAGNOSIS — Z00.00 MEDICARE ANNUAL WELLNESS VISIT, SUBSEQUENT: Primary | ICD-10-CM

## 2024-03-25 DIAGNOSIS — Z85.46 HISTORY OF PROSTATE CANCER: ICD-10-CM

## 2024-03-25 PROCEDURE — G8420 CALC BMI NORM PARAMETERS: HCPCS | Performed by: INTERNAL MEDICINE

## 2024-03-25 PROCEDURE — 99497 ADVNCD CARE PLAN 30 MIN: CPT | Performed by: INTERNAL MEDICINE

## 2024-03-25 PROCEDURE — 99214 OFFICE O/P EST MOD 30 MIN: CPT | Performed by: INTERNAL MEDICINE

## 2024-03-25 PROCEDURE — 1036F TOBACCO NON-USER: CPT | Performed by: INTERNAL MEDICINE

## 2024-03-25 PROCEDURE — G8427 DOCREV CUR MEDS BY ELIG CLIN: HCPCS | Performed by: INTERNAL MEDICINE

## 2024-03-25 PROCEDURE — G8484 FLU IMMUNIZE NO ADMIN: HCPCS | Performed by: INTERNAL MEDICINE

## 2024-03-25 PROCEDURE — 3017F COLORECTAL CA SCREEN DOC REV: CPT | Performed by: INTERNAL MEDICINE

## 2024-03-25 PROCEDURE — G0439 PPPS, SUBSEQ VISIT: HCPCS | Performed by: INTERNAL MEDICINE

## 2024-03-25 PROCEDURE — 1123F ACP DISCUSS/DSCN MKR DOCD: CPT | Performed by: INTERNAL MEDICINE

## 2024-03-25 RX ORDER — TRAZODONE HYDROCHLORIDE 50 MG/1
50 TABLET ORAL NIGHTLY PRN
Qty: 30 TABLET | Refills: 1 | Status: SHIPPED | OUTPATIENT
Start: 2024-03-25

## 2024-03-25 RX ORDER — ZOLPIDEM TARTRATE 10 MG/1
1 TABLET ORAL NIGHTLY
COMMUNITY

## 2024-03-25 SDOH — ECONOMIC STABILITY: FOOD INSECURITY: WITHIN THE PAST 12 MONTHS, YOU WORRIED THAT YOUR FOOD WOULD RUN OUT BEFORE YOU GOT MONEY TO BUY MORE.: NEVER TRUE

## 2024-03-25 SDOH — ECONOMIC STABILITY: FOOD INSECURITY: WITHIN THE PAST 12 MONTHS, THE FOOD YOU BOUGHT JUST DIDN'T LAST AND YOU DIDN'T HAVE MONEY TO GET MORE.: NEVER TRUE

## 2024-03-25 SDOH — ECONOMIC STABILITY: INCOME INSECURITY: HOW HARD IS IT FOR YOU TO PAY FOR THE VERY BASICS LIKE FOOD, HOUSING, MEDICAL CARE, AND HEATING?: NOT HARD AT ALL

## 2024-03-25 NOTE — PATIENT INSTRUCTIONS
Instructions.\"  Current as of: November 16, 2023               Content Version: 14.0  © 0444-5369 [employer].   Care instructions adapted under license by [employer]. If you have questions about a medical condition or this instruction, always ask your healthcare professional. [employer] disclaims any warranty or liability for your use of this information.      Personalized Preventive Plan for Fran Coe - 3/25/2024  Medicare offers a range of preventive health benefits. Some of the tests and screenings are paid in full while other may be subject to a deductible, co-insurance, and/or copay.    Some of these benefits include a comprehensive review of your medical history including lifestyle, illnesses that may run in your family, and various assessments and screenings as appropriate.    After reviewing your medical record and screening and assessments performed today your provider may have ordered immunizations, labs, imaging, and/or referrals for you.  A list of these orders (if applicable) as well as your Preventive Care list are included within your After Visit Summary for your review.    Other Preventive Recommendations:    A preventive eye exam performed by an eye specialist is recommended every 1-2 years to screen for glaucoma; cataracts, macular degeneration, and other eye disorders.  A preventive dental visit is recommended every 6 months.  Try to get at least 150 minutes of exercise per week or 10,000 steps per day on a pedometer .  Order or download the FREE \"Exercise & Physical Activity: Your Everyday Guide\" from [employer]. Call 1-701.879.9324 or search [employer] online.  You need 6008-0361 mg of calcium and 5565-8767 IU of vitamin D per day. It is possible to meet your calcium requirement with diet alone, but a vitamin D supplement is usually necessary to meet this goal.  When exposed to the sun, use a sunscreen that protects

## 2024-03-25 NOTE — PROGRESS NOTES
Fran Coe presents today for   Chief Complaint   Patient presents with    Medicare AWV       \"Have you been to the ER, urgent care clinic since your last visit?  Hospitalized since your last visit?\"    NO    “Have you seen or consulted any other health care providers outside of [facility] since your last visit?”    YES - When: approximately 9 months ago.  Where and Why: Gastroenterology, colonoscopy and endoscopy.

## 2024-03-25 NOTE — ACP (ADVANCE CARE PLANNING)
Advance Care Planning     Advance Care Planning (ACP) Physician/NP/PA Conversation    Date of Conversation: 3/25/2024  Conducted with: Patient with Decision Making Capacity    Healthcare Decision Maker:      Primary Decision Maker: Leti Vaughn - Jose - [phone]    Click here to complete Healthcare Decision Makers including selection of the Healthcare Decision Maker Relationship (ie \"Primary\")  Today we documented Decision Maker(s) consistent with Legal Next of Kin hierarchy.    Care Preferences:    Hospitalization:  \"If your health worsens and it becomes clear that your chance of recovery is unlikely, what would be your preference regarding hospitalization?\"  The patient would prefer hospitalization.    Ventilation:  \"If you were unable to breath on your own and your chance of recovery was unlikely, what would be your preference about the use of a ventilator (breathing machine) if it was available to you?\"  The patient would desire the use of a ventilator.    Resuscitation:  \"In the event your heart stopped as a result of an underlying serious health condition, would you want attempts made to restart your heart, or would you prefer a natural death?\"  Yes, attempt to resuscitate.    ventilation preferences, hospitalization preferences, and resuscitation preferences    Conversation Outcomes / Follow-Up Plan:  ACP in process - information provided, considering goals and options  Reviewed DNR/DNI and patient elects Full Code (Attempt Resuscitation)    Length of Voluntary ACP Conversation in minutes:  16 minutes    SANDY BLUNT MD

## 2024-03-25 NOTE — PROGRESS NOTES
Medicare Annual Wellness Visit    Fran Coe is here for Medicare AWV    Assessment & Plan   Insomnia, unspecified type  -     traZODone (DESYREL) 50 MG tablet; Take 1 tablet by mouth nightly as needed for Sleep, Disp-30 tablet, R-1Normal  Girard's esophagus without dysplasia  Polyp of colon, unspecified part of colon, unspecified type  Dyslipidemia  -     Comprehensive Metabolic Panel; Future  -     CBC with Auto Differential; Future  -     Lipid Panel; Future  History of prostate cancer  Advanced care planning/counseling discussion  Medicare annual wellness visit, subsequent    Recommendations for Preventive Services Due: see orders and patient instructions/AVS.  Recommended screening schedule for the next 5-10 years is provided to the patient in written form: see Patient Instructions/AVS.     Return for Medicare Annual Wellness Visit in 1 year.     Subjective       Patient's complete Health Risk Assessment and screening values have been reviewed and are found in Flowsheets. The following problems were reviewed today and where indicated follow up appointments were made and/or referrals ordered.    Positive Risk Factor Screenings with Interventions:                  Hearing Screen:  Do you or your family notice any trouble with your hearing that hasn't been managed with hearing aids?: (!) Yes    Interventions:  Patient declines any further evaluation or treatment     Safety:  Do you have any tripping hazards - loose or unsecured carpets or rugs?: (!) Yes  Interventions:  Patient declined any further interventions or treatment                   Objective   Vitals:    03/25/24 0753   BP: (!) 127/54   Site: Left Upper Arm   Position: Sitting   Cuff Size: Large Adult   Pulse: 60   Resp: 16   Temp: 98.2 °F (36.8 °C)   TempSrc: Temporal   SpO2: 97%   Weight: 71.2 kg (157 lb)   Height: 1.727 m (5' 8\")      Body mass index is 23.87 kg/m².               Allergies   Allergen Reactions    Alfuzosin Dizziness or Vertigo

## 2024-04-10 RX ORDER — ATORVASTATIN CALCIUM 10 MG/1
10 TABLET, FILM COATED ORAL DAILY
Qty: 90 TABLET | Refills: 3 | Status: CANCELLED | OUTPATIENT
Start: 2024-04-10

## 2024-04-11 NOTE — TELEPHONE ENCOUNTER
Med sent to pharmacy 2/7/2024 # 90 and 3 refills.     My chart message sent to patient contact pharmacy for refill.

## 2024-09-19 RX ORDER — OMEPRAZOLE 40 MG/1
40 CAPSULE, DELAYED RELEASE ORAL DAILY
Qty: 90 CAPSULE | Refills: 2 | Status: SHIPPED | OUTPATIENT
Start: 2024-09-19

## 2025-02-06 NOTE — PROGRESS NOTES
She continues on coreg 6.25mg BID, hydralazine 25mg BID, and lasix 20mg daily. She is not on ACE-I or ARBS due to ESRD.   PT DAILY TREATMENT NOTE - [facility]     Patient Name: Frankie Willis  Date:3/26/2018  : 1950  [x]  Patient  Verified  Payor: Allie Minor / Plan: [place] PART A & B / Product Type: Medicare /    In time:11:03  Out time:11:42  Total Treatment Time (min): 39  Total Timed Codes (min): 39  1:1 Treatment Time ( [address] only): 44   Visit #: 6 of 12    Treatment Area: Left shoulder pain [M25.512]    SUBJECTIVE  Pain Level (0-10 scale): 0/10  Any medication changes, allergies to medications, adverse drug reactions, diagnosis change, or new procedure performed?: [x] No    [] Yes (see summary sheet for update)  Subjective functional status/changes:   [] No changes reported  Pt reports that his elbow was a little sore over the weekend, but he believes he over-did it slightly with his exercises. He tried 2# for sidelying ER which was too much. He knows he is not supposed to do that exercise with weight. He was also doing the rows but he decreased the resistance band and it was more comfortable.       OBJECTIVE      31 min Therapeutic Exercise:  [x] See flow sheet :   Rationale: increase ROM and increase strength to improve the patients ability to improve ease of reaching with light ADLs       8 min Manual Therapy:  GHJ grade 3 posterior/inferior mobs, PROM flexion, abduction, and ER   Rationale: decrease pain, increase ROM and increase tissue extensibility to improve ease of reaching with light ADLs            With   [] TE   [] TA   [] neuro   [] other: Patient Education: [x] Review HEP    [] Progressed/Changed HEP based on:   [] positioning   [] body mechanics   [] transfers   [] heat/ice application    [x] other: reminded pt not to use weight for sidelying ER based on MD's protocol      Other Objective/Functional Measures:     Slight pain with abduction pulleys, subsided immediately when lowered  Pt was pleased with eccentric wall slides    Slight pain with posterior capsule stretch, instructed to perform in pain-free range     All PROM unforced and pain-free  Firm end-feel with abd/ER    PROM abd 114 dgrs  PROM ER 40 dgs (50 dgrs last assessed)    TTP proximal brachioradialis, educated patient on ice use 10-15 minutes or ice massage 5 minutes     Pt requested to perform AROM to > 90 dgrs, instructed to only perform to 90 dgrs per protocol  Premature and increased upward rotation of left scapula with shoulder elevation     Pain Level (0-10 scale) post treatment: 0/10    ASSESSMENT/Changes in Function:     Pt is making steady progress towards therapy goals. Pt continues to present with firm end-feel with PROM, with reduced ER PROM this visit possibly resulting from increased stiffness as pt decreased activity level following muscular irritation this weekend. Brachioradialis pain is likely d/t over-use, and pt was educated on ice/ice massage to address inflammation. Will continue to address strength and ROM deficits per protocol for return to PLOF. Patient will continue to benefit from skilled PT services to modify and progress therapeutic interventions, address ROM deficits, address strength deficits, analyze and address soft tissue restrictions, analyze and cue movement patterns, assess and modify postural abnormalities and instruct in home and community integration to attain remaining goals. Progress towards goals / Updated goals:  1. Patient will improve FOTO score by 38 points in order to demonstrate a significant improvement in function. 2. Patient will improve L shoulder AROM flex/scaption to 120 degrees in order to increase ease of ADLs. 3. Patient will improve behind back reaching to T12 in order to increase ease of getting dressed.      PLAN  []  Upgrade activities as tolerated     [x]  Continue plan of care  []  Update interventions per flow sheet       []  Discharge due to:_  []  Other:_      Milagros Azul, PT 3/26/2018  11:17 AM    Future Appointments  Date Time Provider Department [place]   3/29/2018 8:00 AM Yury Hylton, PT [id number] [place]   2/26/2019 7:45 AM IOC NURSE VISIT [facility]   3/5/2019 1:00 PM Karely Killian MD [facility]

## 2025-03-20 ENCOUNTER — HOSPITAL ENCOUNTER (OUTPATIENT)
Facility: HOSPITAL | Age: 75
Setting detail: SPECIMEN
Discharge: HOME | End: 2025-03-23
Payer: MEDICARE

## 2025-03-20 DIAGNOSIS — E78.5 DYSLIPIDEMIA: ICD-10-CM

## 2025-03-20 LAB
ALBUMIN SERPL-MCNC: 4 G/DL (ref 3.4–5)
ALBUMIN/GLOB SERPL: 1.5 (ref 0.8–1.7)
ALP SERPL-CCNC: 55 U/L (ref 45–117)
ALT SERPL-CCNC: 26 U/L (ref 16–61)
ANION GAP SERPL CALC-SCNC: 3 MMOL/L (ref 3–18)
AST SERPL-CCNC: 23 U/L (ref 10–38)
BILIRUB SERPL-MCNC: 0.9 MG/DL (ref 0.2–1)
BUN SERPL-MCNC: 14 MG/DL (ref 7–18)
BUN/CREAT SERPL: 17 (ref 12–20)
CALCIUM SERPL-MCNC: 9.6 MG/DL (ref 8.5–10.1)
CHLORIDE SERPL-SCNC: 105 MMOL/L (ref 100–111)
CHOLEST SERPL-MCNC: 179 MG/DL
CO2 SERPL-SCNC: 31 MMOL/L (ref 21–32)
CREAT SERPL-MCNC: 0.83 MG/DL (ref 0.6–1.3)
GLOBULIN SER CALC-MCNC: 2.6 G/DL (ref 2–4)
GLUCOSE SERPL-MCNC: 90 MG/DL (ref 74–99)
HDLC SERPL-MCNC: 54 MG/DL (ref 40–60)
HDLC SERPL: 3.3 (ref 0–5)
LDLC SERPL CALC-MCNC: 108.6 MG/DL (ref 0–100)
LIPID PANEL: ABNORMAL
POTASSIUM SERPL-SCNC: 4.3 MMOL/L (ref 3.5–5.5)
PROT SERPL-MCNC: 6.6 G/DL (ref 6.4–8.2)
SODIUM SERPL-SCNC: 139 MMOL/L (ref 136–145)
TRIGL SERPL-MCNC: 82 MG/DL
VLDLC SERPL CALC-MCNC: 16.4 MG/DL

## 2025-03-20 PROCEDURE — 80061 LIPID PANEL: CPT

## 2025-03-20 PROCEDURE — 80053 COMPREHEN METABOLIC PANEL: CPT

## 2025-03-20 PROCEDURE — 36415 COLL VENOUS BLD VENIPUNCTURE: CPT

## 2025-03-24 SDOH — ECONOMIC STABILITY: INCOME INSECURITY: IN THE LAST 12 MONTHS, WAS THERE A TIME WHEN YOU WERE NOT ABLE TO PAY THE MORTGAGE OR RENT ON TIME?: NO

## 2025-03-24 SDOH — ECONOMIC STABILITY: TRANSPORTATION INSECURITY
IN THE PAST 12 MONTHS, HAS THE LACK OF TRANSPORTATION KEPT YOU FROM MEDICAL APPOINTMENTS OR FROM GETTING MEDICATIONS?: NO

## 2025-03-24 SDOH — ECONOMIC STABILITY: FOOD INSECURITY: WITHIN THE PAST 12 MONTHS, YOU WORRIED THAT YOUR FOOD WOULD RUN OUT BEFORE YOU GOT MONEY TO BUY MORE.: NEVER TRUE

## 2025-03-24 SDOH — ECONOMIC STABILITY: FOOD INSECURITY: WITHIN THE PAST 12 MONTHS, THE FOOD YOU BOUGHT JUST DIDN'T LAST AND YOU DIDN'T HAVE MONEY TO GET MORE.: NEVER TRUE

## 2025-03-24 SDOH — HEALTH STABILITY: PHYSICAL HEALTH: ON AVERAGE, HOW MANY DAYS PER WEEK DO YOU ENGAGE IN MODERATE TO STRENUOUS EXERCISE (LIKE A BRISK WALK)?: 3 DAYS

## 2025-03-24 SDOH — HEALTH STABILITY: PHYSICAL HEALTH: ON AVERAGE, HOW MANY MINUTES DO YOU ENGAGE IN EXERCISE AT THIS LEVEL?: 60 MIN

## 2025-03-24 SDOH — ECONOMIC STABILITY: TRANSPORTATION INSECURITY
IN THE PAST 12 MONTHS, HAS LACK OF TRANSPORTATION KEPT YOU FROM MEETINGS, WORK, OR FROM GETTING THINGS NEEDED FOR DAILY LIVING?: NO

## 2025-03-24 ASSESSMENT — LIFESTYLE VARIABLES
HOW OFTEN DO YOU HAVE SIX OR MORE DRINKS ON ONE OCCASION: 1
HOW OFTEN DO YOU HAVE A DRINK CONTAINING ALCOHOL: MONTHLY OR LESS
HOW OFTEN DO YOU HAVE A DRINK CONTAINING ALCOHOL: 2
HOW MANY STANDARD DRINKS CONTAINING ALCOHOL DO YOU HAVE ON A TYPICAL DAY: PATIENT DOES NOT DRINK
HOW MANY STANDARD DRINKS CONTAINING ALCOHOL DO YOU HAVE ON A TYPICAL DAY: 0

## 2025-03-24 ASSESSMENT — PATIENT HEALTH QUESTIONNAIRE - PHQ9
SUM OF ALL RESPONSES TO PHQ QUESTIONS 1-9: 0
1. LITTLE INTEREST OR PLEASURE IN DOING THINGS: NOT AT ALL
SUM OF ALL RESPONSES TO PHQ QUESTIONS 1-9: 0
2. FEELING DOWN, DEPRESSED OR HOPELESS: NOT AT ALL
SUM OF ALL RESPONSES TO PHQ QUESTIONS 1-9: 0
SUM OF ALL RESPONSES TO PHQ QUESTIONS 1-9: 0

## 2025-03-27 ENCOUNTER — OFFICE VISIT (OUTPATIENT)
Facility: CLINIC | Age: 75
End: 2025-03-27

## 2025-03-27 VITALS
WEIGHT: 152 LBS | HEART RATE: 63 BPM | TEMPERATURE: 98.2 F | HEIGHT: 68 IN | RESPIRATION RATE: 16 BRPM | BODY MASS INDEX: 23.04 KG/M2 | OXYGEN SATURATION: 99 % | DIASTOLIC BLOOD PRESSURE: 77 MMHG | SYSTOLIC BLOOD PRESSURE: 131 MMHG

## 2025-03-27 DIAGNOSIS — K21.9 GASTROESOPHAGEAL REFLUX DISEASE, UNSPECIFIED WHETHER ESOPHAGITIS PRESENT: ICD-10-CM

## 2025-03-27 DIAGNOSIS — E78.5 DYSLIPIDEMIA: ICD-10-CM

## 2025-03-27 DIAGNOSIS — C61 MALIGNANT NEOPLASM OF PROSTATE (HCC): ICD-10-CM

## 2025-03-27 DIAGNOSIS — Z71.89 ADVANCED CARE PLANNING/COUNSELING DISCUSSION: ICD-10-CM

## 2025-03-27 DIAGNOSIS — K63.5 POLYP OF COLON, UNSPECIFIED PART OF COLON, UNSPECIFIED TYPE: ICD-10-CM

## 2025-03-27 DIAGNOSIS — K22.70 BARRETT'S ESOPHAGUS WITHOUT DYSPLASIA: ICD-10-CM

## 2025-03-27 DIAGNOSIS — Z00.00 MEDICARE ANNUAL WELLNESS VISIT, SUBSEQUENT: Primary | ICD-10-CM

## 2025-03-27 PROBLEM — Z85.46 HISTORY OF PROSTATE CANCER: Status: RESOLVED | Noted: 2021-03-12 | Resolved: 2025-03-27

## 2025-03-27 RX ORDER — ATORVASTATIN CALCIUM 10 MG/1
10 TABLET, FILM COATED ORAL DAILY
Qty: 90 TABLET | Refills: 3 | Status: SHIPPED | OUTPATIENT
Start: 2025-03-27

## 2025-03-27 NOTE — ACP (ADVANCE CARE PLANNING)
Advance Care Planning     Advance Care Planning (ACP) Physician/NP/PA Conversation    Date of Conversation: 3/27/2025  Conducted with: Patient with Decision Making Capacity    Healthcare Decision Maker:      Primary Decision Maker: Leti Vaughn - Jose - [phone]    Click here to complete Healthcare Decision Makers including selection of the Healthcare Decision Maker Relationship (ie \"Primary\")  Today we documented Decision Maker(s) consistent with Legal Next of Kin hierarchy.    Care Preferences:    Hospitalization:  \"If your health worsens and it becomes clear that your chance of recovery is unlikely, what would be your preference regarding hospitalization?\"  The patient would prefer hospitalization.    Ventilation:  \"If you were unable to breath on your own and your chance of recovery was unlikely, what would be your preference about the use of a ventilator (breathing machine) if it was available to you?\"  The patient would desire the use of a ventilator.    Resuscitation:  \"In the event your heart stopped as a result of an underlying serious health condition, would you want attempts made to restart your heart, or would you prefer a natural death?\"  Yes, attempt to resuscitate.    ventilation preferences, hospitalization preferences, and resuscitation preferences    Conversation Outcomes / Follow-Up Plan:  ACP in process - information provided, considering goals and options  Reviewed DNR/DNI and patient elects Full Code (Attempt Resuscitation)    Length of Voluntary ACP Conversation in minutes:  16 minutes    SANDY BLUNT MD

## 2025-03-27 NOTE — PATIENT INSTRUCTIONS
F075 to learn more about \"A Healthy Heart: Care Instructions.\"  Current as of: July 31, 2024  Content Version: 14.4  © 2385-7909 [employer].   Care instructions adapted under license by [employer]. If you have questions about a medical condition or this instruction, always ask your healthcare professional. [employer] disclaims any warranty or liability for your use of this information.    Personalized Preventive Plan for Fran Coe - 3/27/2025  Medicare offers a range of preventive health benefits. Some of the tests and screenings are paid in full while other may be subject to a deductible, co-insurance, and/or copay.  Some of these benefits include a comprehensive review of your medical history including lifestyle, illnesses that may run in your family, and various assessments and screenings as appropriate.  After reviewing your medical record and screening and assessments performed today your provider may have ordered immunizations, labs, imaging, and/or referrals for you.  A list of these orders (if applicable) as well as your Preventive Care list are included within your After Visit Summary for your review.

## 2025-03-27 NOTE — PROGRESS NOTES
Medicare Annual Wellness Visit    Fran Coe is here for Medicare AW    Assessment & Plan   Advanced care planning/counseling discussion  Malignant neoplasm of prostate (HCC)  Girard's esophagus without dysplasia  Polyp of colon, unspecified part of colon, unspecified type  Gastroesophageal reflux disease, unspecified whether esophagitis present  Dyslipidemia  -     CBC with Auto Differential; Future  -     Comprehensive Metabolic Panel; Future  -     Lipid Panel; Future  -     atorvastatin (LIPITOR) 10 MG tablet; Take 1 tablet by mouth daily, Disp-90 tablet, R-3Normal  Medicare annual wellness visit, subsequent       Return in 1 year (on 3/27/2026) for Medicare AWV.     Subjective       Patient's complete Health Risk Assessment and screening values have been reviewed and are found in Flowsheets. The following problems were reviewed today and where indicated follow up appointments were made and/or referrals ordered.    Positive Risk Factor Screenings with Interventions:                  Hearing Screen:  Do you or your family notice any trouble with your hearing that hasn't been managed with hearing aids?: (!) (Patient-Rptd) Yes    Interventions:  Patient declines any further evaluation or treatment     Safety:  Do you have any tripping hazards - loose or unsecured carpets or rugs?: (!) (Patient-Rptd) Yes  Interventions:  Patient declined any further interventions or treatment           Pt is not sexually active        Objective   Vitals:    03/27/25 0811   BP: 131/77   Pulse: 63   Resp: 16   Temp: 98.2 °F (36.8 °C)   TempSrc: Temporal   SpO2: 99%   Weight: 68.9 kg (152 lb)   Height: 1.727 m (5' 8\")      Body mass index is 23.11 kg/m².                    Allergies   Allergen Reactions    Alfuzosin Dizziness or Vertigo     And fainting    Tamsulosin Dizziness or Vertigo     Prior to Visit Medications    Medication Sig Taking? Authorizing Provider   atorvastatin (LIPITOR) 10 MG tablet Take 1 tablet by mouth 
Fran Coe presents today for   Chief Complaint   Patient presents with    Medicare AWV       \"Have you been to the ER, urgent care clinic since your last visit?  Hospitalized since your last visit?\"    NO    “Have you seen or consulted any other health care providers outside of [facility] since your last visit?”    YES - When: approximately 1 months ago.  Where and Why: [facility].           
127, tg 63,   hdl 54, ldl-c 60,    wbc 4.3, hb 14.9, plt 247, hep c-   From 12/17 showed glu 96, cr 0.70, gfr>60,          wbc 4.7, hb 14.1, plt 255   From 3/19 showed gluc 84, cr 0.81, gfr>60, alt 27,        chol 124, tg 77,   hdl 42, ldl-c 67,    wbc 4.3, hb 14.3, plt 253   From 3/20 showed gluc 80, cr 0.76, gfr>60, alt 27,        chol 138, tg 81,   hdl 46, ldl-c 76,    wbc 5.4, hb 14.4, plt 246, psa 6.70   From 3/21 showed gluc 93, cr 0.80, gfr>60, alt 27,        chol 156, tg 123, hdl 46, ldl-c 85,    wbc 4.9, hb 15.1, plt 284   From 3/22 showed gluc 80, cr 0.90, gfr>60, alt 25,       chol 137, tg 72,   hdl 45, ldl-c 78,    wbc 3.8, hb 13.7, plt 250   From 3/23 showed gluc 81, cr 0.82, gfr>60, alt 25,        chol 150, tg 73,   hdl 48, ldl-c 87,    wbc 4.7, hb 14.5, plt 267   From 3/24 showed gluc 88, cr 0.89, gfr>60, alt 25,        chol 137, tg 78,   hdl 49, ldl-c 72,    wbc 4.9, hb 13.6, plt 286    Results for orders placed or performed during the hospital encounter of 03/20/25 (from the past 2160 hours)   Comprehensive Metabolic Panel   Result Value Ref Range    Sodium 139 136 - 145 mmol/L    Potassium 4.3 3.5 - 5.5 mmol/L    Chloride 105 100 - 111 mmol/L    CO2 31 21 - 32 mmol/L    Anion Gap 3 3.0 - 18 mmol/L    Glucose 90 74 - 99 mg/dL    BUN 14 7.0 - 18 MG/DL    Creatinine 0.83 0.6 - 1.3 MG/DL    BUN/Creatinine Ratio 17 12 - 20      Est, Glom Filt Rate >90 >60 ml/min/1.73m2    Calcium 9.6 8.5 - 10.1 MG/DL    Total Bilirubin 0.9 0.2 - 1.0 MG/DL    ALT 26 16 - 61 U/L    AST 23 10 - 38 U/L    Alk Phosphatase 55 45 - 117 U/L    Total Protein 6.6 6.4 - 8.2 g/dL    Albumin 4.0 3.4 - 5.0 g/dL    Globulin 2.6 2.0 - 4.0 g/dL    Albumin/Globulin Ratio 1.5 0.8 - 1.7     Lipid Panel   Result Value Ref Range    LIPID PANEL        Cholesterol, Total 179 <200 MG/DL    Triglycerides 82 <150 MG/DL    HDL 54 40 - 60 MG/DL    LDL Cholesterol 108.6 (H) 0 - 100 MG/DL    VLDL Cholesterol Calculated 16.4 MG/DL    Chol/HDL Ratio

## 2025-05-21 NOTE — PROGRESS NOTES
Labs transferred from [facility] to [facility].      Grant Ramos CMA
activity

## 2025-07-17 RX ORDER — OMEPRAZOLE 40 MG/1
40 CAPSULE, DELAYED RELEASE ORAL DAILY
Qty: 90 CAPSULE | Refills: 3 | Status: SHIPPED | OUTPATIENT
Start: 2025-07-17

## 2025-07-17 NOTE — TELEPHONE ENCOUNTER
PCP: Golden Ruff MD    LAST OFFICE VISIT:03/27/2025    LAST REFILL PER CHART:  Medication:omeprazole (PRILOSEC) 40 MG delayed release capsule   Ordered On:09/19/2024  Instructions:take 1 capsule by mouth once daily   Dispense:90 capsules  Refills:2    Future Appointments   Date Time Provider Department Center   10/6/2025 10:40 AM [facility] CANCER BLOOD ROOM [facility] [place] Sched   10/13/2025  9:40 AM Tila Hansen PA-C [facility] [place] Sched   3/24/2026  7:45 AM [facility] LAB VISIT [facility] DEP   3/31/2026  8:00 AM Golden Ruff MD [facility] DE[facility]

## (undated) DEVICE — MEDI-VAC NON-CONDUCTIVE SUCTION TUBING: Brand: CARDINAL HEALTH

## (undated) DEVICE — BITE BLOCK ENDOSCP UNIV AD 6 TO 9.4 MM

## (undated) DEVICE — Device

## (undated) DEVICE — AIRLIFE™ NASAL OXYGEN CANNULA CURVED, NONFLARED TIP WITH 14 FOOT (4.3 M) CRUSH-RESISTANT TUBING, OVER-THE-EAR STYLE: Brand: AIRLIFE™

## (undated) DEVICE — CATH IV SAFE STR 22GX1IN BLU -- PROTECTIV PLUS

## (undated) DEVICE — SYRINGE MED 3ML NDL 22GA L1IN PLAS N CTRL LUERLOCK TIP REG

## (undated) DEVICE — (D)SYR 10ML 1/5ML GRAD NSAF -- PKGING CHANGE USE ITEM 338027

## (undated) DEVICE — FORCEPS BX L240CM JAW DIA2.8MM L CAP W/ NDL MIC MESH TOOTH